# Patient Record
Sex: MALE | Race: WHITE | NOT HISPANIC OR LATINO | Employment: UNEMPLOYED | URBAN - METROPOLITAN AREA
[De-identification: names, ages, dates, MRNs, and addresses within clinical notes are randomized per-mention and may not be internally consistent; named-entity substitution may affect disease eponyms.]

---

## 2017-03-27 ENCOUNTER — OFFICE VISIT (OUTPATIENT)
Dept: FAMILY MEDICINE | Facility: CLINIC | Age: 52
End: 2017-03-27
Payer: COMMERCIAL

## 2017-03-27 VITALS
HEART RATE: 82 BPM | DIASTOLIC BLOOD PRESSURE: 82 MMHG | TEMPERATURE: 98.1 F | BODY MASS INDEX: 29.11 KG/M2 | SYSTOLIC BLOOD PRESSURE: 133 MMHG | OXYGEN SATURATION: 95 % | WEIGHT: 185.5 LBS | HEIGHT: 67 IN

## 2017-03-27 DIAGNOSIS — J06.9 UPPER RESPIRATORY TRACT INFECTION, UNSPECIFIED TYPE: Primary | ICD-10-CM

## 2017-03-27 DIAGNOSIS — M54.50 CHRONIC LOW BACK PAIN WITHOUT SCIATICA, UNSPECIFIED BACK PAIN LATERALITY: ICD-10-CM

## 2017-03-27 DIAGNOSIS — G89.29 CHRONIC LOW BACK PAIN WITHOUT SCIATICA, UNSPECIFIED BACK PAIN LATERALITY: ICD-10-CM

## 2017-03-27 PROCEDURE — 99203 OFFICE O/P NEW LOW 30 MIN: CPT | Performed by: NURSE PRACTITIONER

## 2017-03-27 RX ORDER — HYDROCODONE BITARTRATE AND ACETAMINOPHEN 5; 325 MG/1; MG/1
1-2 TABLET ORAL EVERY 8 HOURS PRN
Qty: 30 TABLET | Refills: 0 | Status: SHIPPED | OUTPATIENT
Start: 2017-03-27 | End: 2023-06-11

## 2017-03-27 RX ORDER — AZITHROMYCIN 250 MG/1
TABLET, FILM COATED ORAL
Qty: 6 TABLET | Refills: 0 | Status: SHIPPED | OUTPATIENT
Start: 2017-03-27 | End: 2023-06-11

## 2017-03-27 RX ORDER — HYDROCODONE BITARTRATE AND ACETAMINOPHEN 10; 325 MG/1; MG/1
1 TABLET ORAL 4 TIMES DAILY
Status: ON HOLD | COMMUNITY
End: 2023-11-13

## 2017-03-27 RX ORDER — TERAZOSIN 10 MG/1
10 CAPSULE ORAL DAILY
COMMUNITY

## 2017-03-27 NOTE — PROGRESS NOTES
SUBJECTIVE:     CC: Dinesh Farfan is an 51 year old male who presents for preventative health visit.     Healthy Habits:    Do you get at least three servings of calcium containing foods daily (dairy, green leafy vegetables, etc.)? yes    Amount of exercise or daily activities, outside of work: 7 day(s) per week    Problems taking medications regularly No    Medication side effects: No    Have you had an eye exam in the past two years? yes    Do you see a dentist twice per year? yes    Do you have sleep apnea, excessive snoring or daytime drowsiness?no  Darrell is here to establish care but to also address URI and chronic low back pain. Recently moved here from [place], with his wife, and needs to establish care. Travels for work and is gone continuously. 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 Leaves for [place] in a few days again. 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           Today's PHQ-2 Score: No flowsheet data found.    Abuse: Current or Past(Physical, Sexual or Emotional)- No  Do you feel safe in your environment - Yes    Social History   Substance Use Topics     Smoking status: Current Every Day Smoker     Packs/day: 1.00     Years: 0.00     Types: Cigarettes     Smokeless tobacco: Not on file     Alcohol use Yes      Comment: rarely     rarely drink    Last PSA: No results found for: PSA    No results for input(s): CHOL, HDL, LDL, TRIG, CHOLHDLRATIO, NHDL in the last 91658 hours.    Reviewed orders with patient. Reviewed health maintenance and updated orders accordingly - Yes    Reviewed and updated as needed this visit by clinical staff  Tobacco  Allergies  Meds  Problems  Med Hx  Surg Hx  Fam Hx  Soc Hx          Reviewed and updated as needed this visit by Provider  Tobacco  Allergies  Meds  Problems  Med Hx  Surg Hx  Soc Hx           ROS:  C: NEGATIVE for fever, chills, change in  "weight  I: NEGATIVE for worrisome rashes, moles or lesions  E: NEGATIVE for vision changes or irritation  ENT: NEGATIVE for ear, mouth and throat problems  R: NEGATIVE for significant cough or SOB  CV: NEGATIVE for chest pain, palpitations or peripheral edema  GI: NEGATIVE for nausea, abdominal pain, heartburn, or change in bowel habits   male: negative for dysuria, hematuria, decreased urinary stream, erectile dysfunction, urethral discharge  MUSCULOSKELETAL:joint stiffness low back  N: NEGATIVE for weakness, dizziness or paresthesias  P: NEGATIVE for changes in mood or affect      OBJECTIVE:     /82 (BP Location: Right arm, Patient Position: Chair, Cuff Size: Adult Large)  Pulse 82  Temp 98.1  F (36.7  C) (Oral)  Ht 5' 7\" (1.702 m)  Wt 185 lb 8 oz (84.1 kg)  SpO2 95%  BMI 29.05 kg/m2  EXAM:  GENERAL: alert and mild distress  EYES: Eyes grossly normal to inspection, PERRL and conjunctivae and sclerae normal  HENT: ear canals and TM's normal, nose and mouth without ulcers or lesions  NECK: no adenopathy, no asymmetry, masses, or scars and thyroid normal to palpation  RESP: lungs course throughout, productive cough.   CV: regular rate and rhythm, normal S1 S2, no S3 or S4, no murmur, click or rub, no peripheral edema and peripheral pulses strong  ABDOMEN: soft, nontender, no hepatosplenomegaly, no masses and bowel sounds normal  MS: stiffness throughout his body. Limited range of motion in low back.   PSYCH: mentation appears normal, affect normal/bright    ASSESSMENT/PLAN:     1. Upper respiratory tract infection, unspecified type  Azithromycin for five days. Mucinex to help with congestion. Needs to stop smoking.   - azithromycin (ZITHROMAX) 250 MG tablet; Two tablets first day, then one tablet daily for four days.  Dispense: 6 tablet; Refill: 0    2. Chronic low back pain without sciatica, unspecified back pain laterality  [place] for pain but have explained [facility]'s policy that we do not write for " "pain medications and will need to be followed by the pain clinic. Patient has expressed understanding. Takes one Norco per day on most days. Using ibuprofen during the day.   - HYDROcodone-acetaminophen (NORCO) 5-325 MG per tablet; Take 1-2 tablets by mouth every 8 hours as needed for moderate to severe pain maximum 2 tablet(s) per day  Dispense: 30 tablet; Refill: 0    COUNSELING:  Reviewed preventive health counseling, as reflected in patient instructions       Regular exercise       Healthy diet/nutrition       Consider Hep C screening for patients born between 1945 and 1965       Colon cancer screening    BP Screening:   Last 3 BP Readings:    BP Readings from Last 3 Encounters:   03/27/17 133/82       The following was recommended to the patient:  Re-screen BP within a year and recommended lifestyle modifications     reports that he has been smoking Cigarettes.  He has been smoking about 1.00 pack per day for the past 0.00 years. He does not have any smokeless tobacco history on file.  Tobacco Cessation Action Plan: Information offered: Patient not interested at this time  Estimated body mass index is 29.05 kg/(m^2) as calculated from the following:    Height as of this encounter: 5' 7\" (1.702 m).    Weight as of this encounter: 185 lb 8 oz (84.1 kg).   Weight management plan: Discussed healthy diet and exercise guidelines and patient will follow up in 6 months in clinic to re-evaluate.    Counseling Resources:  ATP IV Guidelines  Pooled Cohorts Equation Calculator  FRAX Risk Assessment  ICSI Preventive Guidelines  Dietary Guidelines for Americans, 2010  USDA's MyPlate  ASA Prophylaxis  Lung CA Screening    Estela Sawyer NP  [facility]  "

## 2017-03-27 NOTE — MR AVS SNAPSHOT
After Visit Summary   3/27/2017    Dinesh Farfan    MRN: [id number]           Patient Information     Date Of Birth          1965        Visit Information        Provider Department      3/27/2017 3:40 PM Estela Sawyer NP [facility]        Today's Diagnoses     Upper respiratory tract infection, unspecified type    -  1    Chronic low back pain without sciatica, unspecified back pain laterality          Care Instructions      Preventive Health Recommendations  Male Ages 50 - 64    Yearly exam:             See your health care provider every year in order to  o   Review health changes.   o   Discuss preventive care.    o   Review your medicines if your doctor has prescribed any.     Have a cholesterol test every 5 years, or more frequently if you are at risk for high cholesterol/heart disease.     Have a diabetes test (fasting glucose) every three years. If you are at risk for diabetes, you should have this test more often.     Have a colonoscopy at age 50, or have a yearly FIT test (stool test). These exams will check for colon cancer.      Talk with your health care provider about whether or not a prostate cancer screening test (PSA) is right for you.    You should be tested each year for STDs (sexually transmitted diseases), if you re at risk.     Shots: Get a flu shot each year. Get a tetanus shot every 10 years.     Nutrition:    Eat at least 5 servings of fruits and vegetables daily.     Eat whole-grain bread, whole-wheat pasta and brown rice instead of white grains and rice.     Talk to your provider about Calcium and Vitamin D.     Lifestyle    Exercise for at least 150 minutes a week (30 minutes a day, 5 days a week). This will help you control your weight and prevent disease.     Limit alcohol to one drink per day.     No smoking.     Wear sunscreen to prevent skin cancer.     See your dentist every six months for an exam and cleaning.     See your eye doctor every  "1 to 2 years.          Follow-ups after your visit        Who to contact     If you have questions or need follow up information about today's clinic visit or your schedule please contact [facility] directly at [phone].  Normal or non-critical lab and imaging results will be communicated to you by MyChart, letter or phone within 4 business days after the clinic has received the results. If you do not hear from us within 7 days, please contact the clinic through MyChart or phone. If you have a critical or abnormal lab result, we will notify you by phone as soon as possible.  Submit refill requests through [employer] or call your pharmacy and they will forward the refill request to us. Please allow 3 business days for your refill to be completed.          Additional Information About Your Visit        [employer] Information     [employer] lets you send messages to your doctor, view your test results, renew your prescriptions, schedule appointments and more. To sign up, go to [email or web address] . Click on \"Log in\" on the left side of the screen, which will take you to the Welcome page. Then click on \"Sign up Now\" on the right side of the page.     You will be asked to enter the access code listed below, as well as some personal information. Please follow the directions to create your username and password.     Your access code is: [email or web address]  Expires: 2017 11:31 AM     Your access code will  in 90 days. If you need help or a new code, please call your [facility] or [phone].        Care EveryWhere ID     This is your Care EveryWhere ID. This could be used by other organizations to access your [facility] medical records  [id number]        Your Vitals Were     Pulse Temperature Height Pulse Oximetry BMI (Body Mass Index)       82 98.1  F (36.7  C) (Oral) 5' 7\" (1.702 m) 95% 29.05 kg/m2        Blood Pressure from Last 3 Encounters:   17 133/82    Weight from Last 3 Encounters: "   03/27/17 185 lb 8 oz (84.1 kg)              Today, you had the following     No orders found for display         Today's Medication Changes          These changes are accurate as of: 3/27/17 11:59 PM.  If you have any questions, ask your nurse or doctor.               Start taking these medicines.        Dose/Directions    azithromycin 250 MG tablet   Commonly known as:  ZITHROMAX   Used for:  Upper respiratory tract infection, unspecified type   Started by:  Estela Sawyer NP        Two tablets first day, then one tablet daily for four days.   Quantity:  6 tablet   Refills:  0         These medicines have changed or have updated prescriptions.        Dose/Directions    * HYDROcodone-acetaminophen 7.5-325 MG per tablet   Commonly known as:  NORCO   This may have changed:  Another medication with the same name was added. Make sure you understand how and when to take each.   Changed by:  Estela Sawyer NP        Dose:  1 tablet   Take 1 tablet by mouth every 6 hours as needed for moderate to severe pain   Refills:  0       * HYDROcodone-acetaminophen 5-325 MG per tablet   Commonly known as:  NORCO   This may have changed:  You were already taking a medication with the same name, and this prescription was added. Make sure you understand how and when to take each.   Used for:  Chronic low back pain without sciatica, unspecified back pain laterality   Changed by:  Estela Sawyer NP        Dose:  1-2 tablet   Take 1-2 tablets by mouth every 8 hours as needed for moderate to severe pain maximum 2 tablet(s) per day   Quantity:  30 tablet   Refills:  0       * Notice:  This list has 2 medication(s) that are the same as other medications prescribed for you. Read the directions carefully, and ask your doctor or other care provider to review them with you.         Where to get your medicines      These medications were sent to [facility] [address] [place] [address] [facility]  [id number] [place] [address]  [id number]     Phone:  [phone]     azithromycin 250 MG tablet         Some of these will need a paper prescription and others can be bought over the counter.  Ask your nurse if you have questions.     Bring a paper prescription for each of these medications     HYDROcodone-acetaminophen 5-325 MG per tablet                Primary Care Provider Office Phone # Fax #    Estela SawyerEDNA [phone]       [facility] [address]  [facility] [address]        Thank you!     Thank you for choosing [facility]  for your care. Our goal is always to provide you with excellent care. Hearing back from our patients is one way we can continue to improve our services. Please take a few minutes to complete the written survey that you may receive in the mail after your visit with us. Thank you!             Your Updated Medication List - Protect others around you: Learn how to safely use, store and throw away your medicines at www.disposemymeds.org.          This list is accurate as of: 3/27/17 11:59 PM.  Always use your most recent med list.                   Brand Name Dispense Instructions for use    azithromycin 250 MG tablet    ZITHROMAX    6 tablet    Two tablets first day, then one tablet daily for four days.       CLONAZEPAM PO      Take 0.5 mg by mouth       * HYDROcodone-acetaminophen 7.5-325 MG per tablet    NORCO     Take 1 tablet by mouth every 6 hours as needed for moderate to severe pain       * HYDROcodone-acetaminophen 5-325 MG per tablet    NORCO    30 tablet    Take 1-2 tablets by mouth every 8 hours as needed for moderate to severe pain maximum 2 tablet(s) per day       TERAZOSIN HCL PO      Take 5 mg by mouth       ZOLPIDEM TARTRATE PO      Take 10 mg by mouth       * Notice:  This list has 2 medication(s) that are the same as other medications prescribed for you. Read the directions carefully, and ask your doctor or other care provider to review them with you.

## 2023-06-11 ENCOUNTER — APPOINTMENT (OUTPATIENT)
Dept: MRI IMAGING | Facility: CLINIC | Age: 58
End: 2023-06-11
Attending: HOSPITALIST
Payer: COMMERCIAL

## 2023-06-11 ENCOUNTER — APPOINTMENT (OUTPATIENT)
Dept: CT IMAGING | Facility: CLINIC | Age: 58
End: 2023-06-11
Attending: EMERGENCY MEDICINE
Payer: COMMERCIAL

## 2023-06-11 ENCOUNTER — HOSPITAL ENCOUNTER (INPATIENT)
Facility: CLINIC | Age: 58
LOS: 1 days | Discharge: HOME | End: 2023-06-12
Attending: EMERGENCY MEDICINE | Admitting: HOSPITALIST
Payer: COMMERCIAL

## 2023-06-11 DIAGNOSIS — I63.412 CEREBROVASCULAR ACCIDENT (CVA) DUE TO EMBOLISM OF LEFT MIDDLE CEREBRAL ARTERY (H): ICD-10-CM

## 2023-06-11 DIAGNOSIS — I63.9 CEREBROVASCULAR ACCIDENT (CVA), UNSPECIFIED MECHANISM (H): ICD-10-CM

## 2023-06-11 DIAGNOSIS — I63.9 STROKE (H): Primary | ICD-10-CM

## 2023-06-11 DIAGNOSIS — F17.210 CIGARETTE NICOTINE DEPENDENCE WITHOUT COMPLICATION: ICD-10-CM

## 2023-06-11 DIAGNOSIS — I65.22 OCCLUSION OF LEFT CAROTID ARTERY: ICD-10-CM

## 2023-06-11 DIAGNOSIS — I63.232 CEREBRAL INFARCTION DUE TO OCCLUSION OF LEFT CAROTID ARTERY (H): ICD-10-CM

## 2023-06-11 PROBLEM — I63.239: Status: ACTIVE | Noted: 2023-06-11

## 2023-06-11 LAB
ANION GAP SERPL CALCULATED.3IONS-SCNC: 11 MMOL/L (ref 7–15)
APTT PPP: 26 SECONDS (ref 22–38)
BASOPHILS # BLD AUTO: 0 10E3/UL (ref 0–0.2)
BASOPHILS NFR BLD AUTO: 1 %
BUN SERPL-MCNC: 16.7 MG/DL (ref 6–20)
CALCIUM SERPL-MCNC: 9 MG/DL (ref 8.6–10)
CHLORIDE SERPL-SCNC: 100 MMOL/L (ref 98–107)
CHOLEST SERPL-MCNC: 168 MG/DL
CREAT SERPL-MCNC: 0.81 MG/DL (ref 0.67–1.17)
DEPRECATED HCO3 PLAS-SCNC: 26 MMOL/L (ref 22–29)
EOSINOPHIL # BLD AUTO: 0 10E3/UL (ref 0–0.7)
EOSINOPHIL NFR BLD AUTO: 1 %
ERYTHROCYTE [DISTWIDTH] IN BLOOD BY AUTOMATED COUNT: 12.6 % (ref 10–15)
GFR SERPL CREATININE-BSD FRML MDRD: >90 ML/MIN/1.73M2
GLUCOSE BLDC GLUCOMTR-MCNC: 116 MG/DL (ref 70–99)
GLUCOSE BLDC GLUCOMTR-MCNC: 91 MG/DL (ref 70–99)
GLUCOSE SERPL-MCNC: 104 MG/DL (ref 70–99)
HBA1C MFR BLD: 6 %
HCT VFR BLD AUTO: 41.3 % (ref 40–53)
HDLC SERPL-MCNC: 44 MG/DL
HGB BLD-MCNC: 14.1 G/DL (ref 13.3–17.7)
HOLD SPECIMEN: NORMAL
IMM GRANULOCYTES # BLD: 0 10E3/UL
IMM GRANULOCYTES NFR BLD: 0 %
INR PPP: 0.91 (ref 0.85–1.15)
LDLC SERPL CALC-MCNC: 92 MG/DL
LYMPHOCYTES # BLD AUTO: 1.7 10E3/UL (ref 0.8–5.3)
LYMPHOCYTES NFR BLD AUTO: 27 %
MCH RBC QN AUTO: 31.8 PG (ref 26.5–33)
MCHC RBC AUTO-ENTMCNC: 34.1 G/DL (ref 31.5–36.5)
MCV RBC AUTO: 93 FL (ref 78–100)
MONOCYTES # BLD AUTO: 0.5 10E3/UL (ref 0–1.3)
MONOCYTES NFR BLD AUTO: 8 %
NEUTROPHILS # BLD AUTO: 4.1 10E3/UL (ref 1.6–8.3)
NEUTROPHILS NFR BLD AUTO: 63 %
NONHDLC SERPL-MCNC: 124 MG/DL
NRBC # BLD AUTO: 0 10E3/UL
NRBC BLD AUTO-RTO: 0 /100
PLATELET # BLD AUTO: 179 10E3/UL (ref 150–450)
POTASSIUM SERPL-SCNC: 3.7 MMOL/L (ref 3.4–5.3)
RBC # BLD AUTO: 4.43 10E6/UL (ref 4.4–5.9)
SODIUM SERPL-SCNC: 137 MMOL/L (ref 136–145)
TRIGL SERPL-MCNC: 162 MG/DL
TROPONIN T SERPL HS-MCNC: 6 NG/L
TROPONIN T SERPL HS-MCNC: 7 NG/L
WBC # BLD AUTO: 6.4 10E3/UL (ref 4–11)

## 2023-06-11 PROCEDURE — A9585 GADOBUTROL INJECTION: HCPCS | Performed by: HOSPITALIST

## 2023-06-11 PROCEDURE — 84484 ASSAY OF TROPONIN QUANT: CPT | Performed by: EMERGENCY MEDICINE

## 2023-06-11 PROCEDURE — 85610 PROTHROMBIN TIME: CPT | Performed by: EMERGENCY MEDICINE

## 2023-06-11 PROCEDURE — 36415 COLL VENOUS BLD VENIPUNCTURE: CPT | Performed by: HOSPITALIST

## 2023-06-11 PROCEDURE — 120N000001 HC R&B MED SURG/OB

## 2023-06-11 PROCEDURE — G0508 CRIT CARE TELEHEA CONSULT 60: HCPCS | Mod: G0 | Performed by: PHYSICIAN ASSISTANT

## 2023-06-11 PROCEDURE — 250N000009 HC RX 250: Performed by: EMERGENCY MEDICINE

## 2023-06-11 PROCEDURE — 83036 HEMOGLOBIN GLYCOSYLATED A1C: CPT | Performed by: HOSPITALIST

## 2023-06-11 PROCEDURE — 250N000011 HC RX IP 250 OP 636: Performed by: EMERGENCY MEDICINE

## 2023-06-11 PROCEDURE — 36415 COLL VENOUS BLD VENIPUNCTURE: CPT | Performed by: EMERGENCY MEDICINE

## 2023-06-11 PROCEDURE — 250N000013 HC RX MED GY IP 250 OP 250 PS 637: Performed by: PHYSICIAN ASSISTANT

## 2023-06-11 PROCEDURE — 99223 1ST HOSP IP/OBS HIGH 75: CPT | Mod: AI | Performed by: HOSPITALIST

## 2023-06-11 PROCEDURE — 250N000013 HC RX MED GY IP 250 OP 250 PS 637: Performed by: EMERGENCY MEDICINE

## 2023-06-11 PROCEDURE — 85025 COMPLETE CBC W/AUTO DIFF WBC: CPT | Performed by: EMERGENCY MEDICINE

## 2023-06-11 PROCEDURE — 250N000013 HC RX MED GY IP 250 OP 250 PS 637: Performed by: HOSPITALIST

## 2023-06-11 PROCEDURE — 93005 ELECTROCARDIOGRAM TRACING: CPT

## 2023-06-11 PROCEDURE — 70553 MRI BRAIN STEM W/O & W/DYE: CPT

## 2023-06-11 PROCEDURE — 70498 CT ANGIOGRAPHY NECK: CPT

## 2023-06-11 PROCEDURE — 80048 BASIC METABOLIC PNL TOTAL CA: CPT | Performed by: EMERGENCY MEDICINE

## 2023-06-11 PROCEDURE — 85730 THROMBOPLASTIN TIME PARTIAL: CPT | Performed by: EMERGENCY MEDICINE

## 2023-06-11 PROCEDURE — 84484 ASSAY OF TROPONIN QUANT: CPT | Performed by: HOSPITALIST

## 2023-06-11 PROCEDURE — 99285 EMERGENCY DEPT VISIT HI MDM: CPT | Mod: 25

## 2023-06-11 PROCEDURE — 70450 CT HEAD/BRAIN W/O DYE: CPT

## 2023-06-11 PROCEDURE — 70496 CT ANGIOGRAPHY HEAD: CPT

## 2023-06-11 PROCEDURE — 80061 LIPID PANEL: CPT | Performed by: HOSPITALIST

## 2023-06-11 PROCEDURE — 0042T CT HEAD PERFUSION W CONTRAST: CPT

## 2023-06-11 PROCEDURE — 255N000002 HC RX 255 OP 636: Performed by: HOSPITALIST

## 2023-06-11 PROCEDURE — 82962 GLUCOSE BLOOD TEST: CPT

## 2023-06-11 RX ORDER — ATORVASTATIN CALCIUM 40 MG/1
80 TABLET, FILM COATED ORAL EVERY EVENING
Status: DISCONTINUED | OUTPATIENT
Start: 2023-06-11 | End: 2023-06-12 | Stop reason: HOSPADM

## 2023-06-11 RX ORDER — ONDANSETRON 4 MG/1
4 TABLET, ORALLY DISINTEGRATING ORAL EVERY 6 HOURS PRN
Status: DISCONTINUED | OUTPATIENT
Start: 2023-06-11 | End: 2023-06-12 | Stop reason: HOSPADM

## 2023-06-11 RX ORDER — LIDOCAINE 4 G/G
1 PATCH TOPICAL
Status: DISCONTINUED | OUTPATIENT
Start: 2023-06-11 | End: 2023-06-12 | Stop reason: HOSPADM

## 2023-06-11 RX ORDER — DULOXETIN HYDROCHLORIDE 60 MG/1
60 CAPSULE, DELAYED RELEASE ORAL DAILY
COMMUNITY

## 2023-06-11 RX ORDER — CLOPIDOGREL BISULFATE 75 MG/1
300 TABLET ORAL ONCE
Status: COMPLETED | OUTPATIENT
Start: 2023-06-11 | End: 2023-06-11

## 2023-06-11 RX ORDER — GADOBUTROL 604.72 MG/ML
10 INJECTION INTRAVENOUS ONCE
Status: COMPLETED | OUTPATIENT
Start: 2023-06-11 | End: 2023-06-11

## 2023-06-11 RX ORDER — HYDROXYZINE HYDROCHLORIDE 25 MG/1
25 TABLET, FILM COATED ORAL 3 TIMES DAILY PRN
COMMUNITY

## 2023-06-11 RX ORDER — ALPRAZOLAM 0.25 MG
0.5 TABLET ORAL ONCE
Status: COMPLETED | OUTPATIENT
Start: 2023-06-11 | End: 2023-06-11

## 2023-06-11 RX ORDER — IOPAMIDOL 755 MG/ML
500 INJECTION, SOLUTION INTRAVASCULAR ONCE
Status: COMPLETED | OUTPATIENT
Start: 2023-06-11 | End: 2023-06-11

## 2023-06-11 RX ORDER — TRAZODONE HYDROCHLORIDE 50 MG/1
50-100 TABLET, FILM COATED ORAL
COMMUNITY
End: 2024-06-13

## 2023-06-11 RX ORDER — ASPIRIN 325 MG
325 TABLET, DELAYED RELEASE (ENTERIC COATED) ORAL DAILY
Status: DISCONTINUED | OUTPATIENT
Start: 2023-06-12 | End: 2023-06-12 | Stop reason: HOSPADM

## 2023-06-11 RX ORDER — SODIUM CHLORIDE 9 MG/ML
INJECTION, SOLUTION INTRAVENOUS CONTINUOUS
Status: DISCONTINUED | OUTPATIENT
Start: 2023-06-11 | End: 2023-06-12 | Stop reason: HOSPADM

## 2023-06-11 RX ORDER — CLOPIDOGREL BISULFATE 75 MG/1
75 TABLET ORAL DAILY
Status: DISCONTINUED | OUTPATIENT
Start: 2023-06-12 | End: 2023-06-12 | Stop reason: HOSPADM

## 2023-06-11 RX ORDER — ASPIRIN 325 MG
325 TABLET ORAL DAILY
Status: DISCONTINUED | OUTPATIENT
Start: 2023-06-11 | End: 2023-06-11

## 2023-06-11 RX ORDER — CLOPIDOGREL BISULFATE 75 MG/1
75 TABLET ORAL DAILY
Status: DISCONTINUED | OUTPATIENT
Start: 2023-06-12 | End: 2023-06-11

## 2023-06-11 RX ORDER — HYDROXYZINE HYDROCHLORIDE 25 MG/1
25 TABLET, FILM COATED ORAL EVERY 6 HOURS PRN
Status: DISCONTINUED | OUTPATIENT
Start: 2023-06-11 | End: 2023-06-12 | Stop reason: HOSPADM

## 2023-06-11 RX ORDER — LANOLIN ALCOHOL/MO/W.PET/CERES
3 CREAM (GRAM) TOPICAL
Status: DISCONTINUED | OUTPATIENT
Start: 2023-06-11 | End: 2023-06-12 | Stop reason: HOSPADM

## 2023-06-11 RX ORDER — METHYLPREDNISOLONE 4 MG
TABLET, DOSE PACK ORAL SEE ADMIN INSTRUCTIONS
Status: ON HOLD | COMMUNITY
Start: 2023-06-08 | End: 2023-06-12

## 2023-06-11 RX ORDER — QUETIAPINE FUMARATE 50 MG/1
50-100 TABLET, FILM COATED ORAL AT BEDTIME
COMMUNITY

## 2023-06-11 RX ORDER — LIDOCAINE 40 MG/G
CREAM TOPICAL
Status: DISCONTINUED | OUTPATIENT
Start: 2023-06-11 | End: 2023-06-12 | Stop reason: HOSPADM

## 2023-06-11 RX ORDER — ONDANSETRON 2 MG/ML
4 INJECTION INTRAMUSCULAR; INTRAVENOUS EVERY 6 HOURS PRN
Status: DISCONTINUED | OUTPATIENT
Start: 2023-06-11 | End: 2023-06-12 | Stop reason: HOSPADM

## 2023-06-11 RX ADMIN — ATORVASTATIN CALCIUM 80 MG: 40 TABLET, FILM COATED ORAL at 19:44

## 2023-06-11 RX ADMIN — Medication 3 MG: at 23:24

## 2023-06-11 RX ADMIN — CLOPIDOGREL BISULFATE 300 MG: 75 TABLET ORAL at 13:09

## 2023-06-11 RX ADMIN — GADOBUTROL 8 ML: 604.72 INJECTION INTRAVENOUS at 22:04

## 2023-06-11 RX ADMIN — SODIUM CHLORIDE 95 ML: 9 INJECTION, SOLUTION INTRAVENOUS at 12:31

## 2023-06-11 RX ADMIN — ALPRAZOLAM 0.5 MG: 0.25 TABLET ORAL at 15:03

## 2023-06-11 RX ADMIN — IOPAMIDOL 125 ML: 755 INJECTION, SOLUTION INTRAVENOUS at 12:31

## 2023-06-11 RX ADMIN — HYDROXYZINE HYDROCHLORIDE 25 MG: 25 TABLET, FILM COATED ORAL at 19:59

## 2023-06-11 RX ADMIN — LIDOCAINE PATCH 4% 1 PATCH: 40 PATCH TOPICAL at 23:24

## 2023-06-11 RX ADMIN — ASPIRIN 325 MG ORAL TABLET 325 MG: 325 PILL ORAL at 13:08

## 2023-06-11 ASSESSMENT — ACTIVITIES OF DAILY LIVING (ADL)
ADLS_ACUITY_SCORE: 35
ADLS_ACUITY_SCORE: 20
FALL_HISTORY_WITHIN_LAST_SIX_MONTHS: NO
ADLS_ACUITY_SCORE: 35
WEAR_GLASSES_OR_BLIND: YES
CHANGE_IN_FUNCTIONAL_STATUS_SINCE_ONSET_OF_CURRENT_ILLNESS/INJURY: NO
HEARING_DIFFICULTY_OR_DEAF: NO
CONCENTRATING,_REMEMBERING_OR_MAKING_DECISIONS_DIFFICULTY: NO
DOING_ERRANDS_INDEPENDENTLY_DIFFICULTY: NO
ADLS_ACUITY_SCORE: 35
ADLS_ACUITY_SCORE: 35
DRESSING/BATHING_DIFFICULTY: NO
DIFFICULTY_EATING/SWALLOWING: NO
ADLS_ACUITY_SCORE: 22
WALKING_OR_CLIMBING_STAIRS_DIFFICULTY: NO
DIFFICULTY_COMMUNICATING: NO
TOILETING_ISSUES: NO

## 2023-06-11 NOTE — ED TRIAGE NOTES
Pt wife reports that at 7pm last night pt started to have slurred speech and confusion. Wife states that pt's symptoms have become worse since she first noticed them. Wife reports that pt is not following her instructions. Wife noted that pt right side of face is drooping and he was drooling a little. Wife states that pt has been able to walk with steady gait.

## 2023-06-11 NOTE — CONSULTS
"      [facility]    Stroke Consult Note    Reason for Consult: Stroke Code     Chief Complaint: Confusion/aphasia/slurred speech    HPI  Dinesh Farfan is a 57 year old male with pertinent past medical history of herniated lumbar disc, tobacco use disorder, history of colon cancer prior on oral chemotherapy but that was completed.    He presented to [facility] emergency department today due to confusion/aphasia/slurred speech persistent since 1900 last night.  In the ED there is appreciated R facial droop by ED provider.  He is only answering \"yea\" and does not follow commands to stick out tongue, no other focal weakness appreciated.  /78.  BG 91.    He was seen via telemedicine for stroke code.  He recalls events from yesterday.  They had bought a new TV.  He went outside and did not have the keys so called his daughter.  Once inside they were working on the TV, he seemed upset because of what happened and sounded off.  She asked if he was joking around.  He then went to bed.  This morning he again was talking oddly and she asked if he was joking.  Given his persistent speech changes she had called a family member who then was concerned he was possibly having a stroke.  She asked him to stick out his tongue and he was not able to.  They then presented to the emergency department for further evaluation.  On exam he has moderate-severe expressive aphasia with some mild receptive aphasia as well.  Slurred speech.  Slight flattening of right nasolabial fold.      CT/CTA showed an established L MCA territory ischemic infarct with a chronic L common/internal carotid artery occlusion with robust collateral reperfusion, concern of proximal L common carotid subocclusive intraluminal thrombus.  Recommended vascular surgery consult.  We will initiate medical management with aspirin and Plavix, could consider heparin drip depending on vascular surgery recommendations.      Imaging Findings " "  CTH:  Evidence of early infarct in the left MCA distribution involving the left insula. Curvilinear calcification near the region of the left MCA trifurcation could represent calcified embolus.    HEAD CTA:   1.  Complete occlusion of the left internal carotid artery with a fairly robust collateral reperfusion to the left cerebral hemisphere.     2.  Asymmetric posterior division MCA branch on left raising the possibility of ostial obstruction.     NECK CTA:  1.  Occlusion of the proximal left common carotid artery. Meniscus is present raising concern for intraluminal thrombus.    CTP:  1.  Large volume abnormal perfusion within the left hemisphere MCA distribution with penumbra. ( CBF 0 ML, T max 9 mL    Intravenous Thrombolysis  Not given due to:   - unclear or unfavorable risk-benefit profile for extended window thrombolysis beyond the conventional 4.5 hour time window    Endovascular Treatment  Proximal vessel occlusion present, but endovascular treatment not initiated due to chronic, vascular surgery contacted    Impression  Acute L MCA territory ischemic infarct in setting of likely chronic L common and internal carotid occlusion, suspect artery to artery embolization from large vessel atherosclerotic disease    Calcified L MCA embolus, suspected chronic, has good collaterals    Recommendations   - Use orderset: \"Ischemic Stroke Routine Admission\" or \"Ischemic Stroke No Thrombolytics/No Thrombectomy ICU Admission\"  -Discussed with vascular neurology attending, Dr. Rosado  -vascular surgery consulted and do not recommend surgical intervention  -keep HOB flat, 75 ml NS/hr, avoid hypotension, goal SBP <220 x first 24 hours post-stroke, if any worsening symptoms please page stroke team and may consider heparin gtt  -Neuro checks and vitals every 2 hours  -TTE (with bubble study if 60 yrs or less)   -Smoking cessation counseling  -MRI brain w/wo contrast   - mg daily  -Plavix 300 mg x 1 then 75 mg daily " "x 90 days  -PT/OT/SPT   -ECG/troponins x 3, telemetry  -blood glucose monitoring, check Hgb A1c (goal <7%)  -Lipitor 80 mg daily, check Lipid panel (titrate to goal LDL 40-70), <40 increases risk of ICH  -Euthermia, euglycemia, eunatremia   -Stroke Education  -Stroke Class per [facility] ([facility])      Patient Follow-up     - final recommendation pending work-up    Thank you for this consult. We will continue to follow.      Ellen Jacobs PA-C  Vascular Neurology    To page me or covering stroke neurology team member, click here: [facility]COM  Choose \"On Call\" tab at top, then select \"NEUROLOGY/ALL SITES\" from middle drop-down box, press Enter, then look for \"stroke\" or \"telestroke\" for your site.    ______________________________________________________    Clinically Significant Risk Factors Present on Admission                  # Hypertension: Home medication list includes antihypertensive(s)               Past Medical History   Past Medical History:   Diagnosis Date     Herniated lumbar disc without myelopathy      Past Surgical History   No past surgical history on file.  Medications   Home Meds  Prior to Admission medications    Medication Sig Start Date End Date Taking? Authorizing Provider   azithromycin (ZITHROMAX) 250 MG tablet Two tablets first day, then one tablet daily for four days. 3/27/17   Estela Sawyer NP   CLONAZEPAM PO Take 0.5 mg by mouth    Reported, Patient   HYDROcodone-acetaminophen (NORCO) 5-325 MG per tablet Take 1-2 tablets by mouth every 8 hours as needed for moderate to severe pain maximum 2 tablet(s) per day 3/27/17   Estela Sawyer NP   HYDROcodone-acetaminophen (NORCO) 7.5-325 MG per tablet Take 1 tablet by mouth every 6 hours as needed for moderate to severe pain    Reported, Patient   TERAZOSIN HCL PO Take 5 mg by mouth    Reported, Patient   ZOLPIDEM TARTRATE PO Take 10 mg by mouth    Reported, Patient       Scheduled Meds    aspirin  325 mg Oral Daily     [START ON " 6/12/2023] clopidogrel  75 mg Oral Daily       Infusion Meds      PRN Meds      Allergies   No Known Allergies  Family History   Family History   Problem Relation Age of Onset     Family History Negative Mother      Family History Negative Father      Social History   Social History     Tobacco Use     Smoking status: Every Day     Packs/day: 1.00     Years: 0.00     Pack years: 0.00     Types: Cigarettes   Substance Use Topics     Alcohol use: Yes     Comment: rarely     Drug use: No       Review of Systems   The 10 point Review of Systems is negative other than noted in the HPI or here.        PHYSICAL EXAMINATION  Temp:  [97.4  F (36.3  C)] 97.4  F (36.3  C)  Pulse:  [] 71  Resp:  [12-20] 12  BP: (129-131)/(78-85) 131/85  SpO2:  [96 %-98 %] 96 %     General Exam  General:  patient lying in bed without any acute distress    HEENT:  normocephalic/atraumatic  Pulmonary:  no respiratory distress    Neuro Exam  Mental Status:  alert, oriented x 3, follows commands, speech slurred with moderate-severe expressive and mild receptive aphasia, is able to name objects/answer most questions with difficulty but formulating sentences to provide history is extremely fragmented  Cranial Nerves:  visual fields intact (tested by nurse), EOMI with normal smooth pursuit, facial sensation intact and symmetric (tested by nurse), mild flattening of R nasolabial fold, hearing not formally tested but intact to conversation, moderate dysarthria, tongue deviates slightly to the right  Motor:  no abnormal movements, able to move all limbs antigravity spontaneously with no signs of hemiparesis observed, no pronator drift, equal  strength (tested by RN), appears to slightly favor her left side with arm roll  Reflexes:  unable to test (telestroke)  Sensory:  light touch sensation intact and symmetric throughout upper and lower extremities (assessed by nurse), no extinction on double simultaneous stimulation (assessed by  nurse)  Coordination:  normal finger-to-nose and heel-to-shin bilaterally without dysmetria  Station/Gait:  unable to test due to telestroke    Dysphagia Screen  Dysarthria or facial droop present - Maintain NPO, consult SLP    Stroke Scales    NIHSS  1a. Level of Consciousness 0-->Alert, keenly responsive   1b. LOC Questions 0-->Answers both questions correctly   1c. LOC Commands 0-->Performs both tasks correctly   2.   Best Gaze 0-->Normal   3.   Visual 0-->No visual loss   4.   Facial Palsy (S) 1-->Minor paralysis (flattened nasolabial fold, asymmetry on smiling) (R)   5a. Motor Arm, Left 0-->No drift, limb holds 90 (or 45) degrees for full 10 secs   5b. Motor Arm, Right 0-->No drift, limb holds 90 (or 45) degrees for full 10 secs   6a. Motor Leg, Left 0-->No drift, leg holds 30 degree position for full 5 secs   6b. Motor Leg, right 0-->No drift, leg holds 30 degree position for full 5 secs   7.   Limb Ataxia 0-->Absent   8.   Sensory 0-->Normal, no sensory loss   9.   Best Language 2-->Severe aphasia, all communication is through fragmentary expression, great need for inference, questioning, and guessing by the listener. Range of information that can be exchanged is limited, listener carries burden of. . . (see row details)   10. Dysarthria 1-->Mild-to-moderate dysarthria, patient slurs at least some words and, at worst, can be understood with some difficulty   11. Extinction and Inattention  0-->No abnormality   Total 4 (06/11/23 1247)       Modified [place] Score (Pre-morbid)  0 - No symptoms.    Imaging  I personally reviewed all imaging; relevant findings per HPI.     Lab Results Data   CBC  Recent Labs   Lab 06/11/23  1220   WBC 6.4   RBC 4.43   HGB 14.1   HCT 41.3        Basic Metabolic Panel    Recent Labs   Lab 06/11/23  1220 06/11/23  1214     --    POTASSIUM 3.7  --    CHLORIDE 100  --    CO2 26  --    BUN 16.7  --    CR 0.81  --    * 91   DUSTIN 9.0  --      Liver Panel  No results for  input(s): PROTTOTAL, ALBUMIN, BILITOTAL, ALKPHOS, AST, ALT, BILIDIRECT in the last 168 hours.  INR    Recent Labs   Lab Test 06/11/23  1220   INR 0.91      Lipid Profile  No lab results found.  A1C  No lab results found.  Troponin    Recent Labs   Lab 06/11/23  1220   CTROPT 6          Stroke Code Data Data   Stroke Code Data  (for stroke code with tele)  Stroke code activated 06/11/23   1216   First stroke provider response 06/11/23   1220   Video start time 06/11/23   1246   Video end time 06/11/23   1341   Last known normal 06/10/23   1859   Time of discovery  (or onset of symptoms)  06/10/23   1900   Head CT read by Stroke Neuro Dr/Provider 06/11/23   1229   Was stroke code de-escalated? Yes 06/11/23 1341           Telestroke Service Details  Type of service telemedicine diagnostic assessment of acute neurological changes   Reason telemedicine is appropriate patient requires assessment with a specialist for diagnosis and treatment of neurological symptoms   Mode of transmission secure interactive audio and video communication per [employer]   Originating site (patient location) [facility]    Distant site (provider location) [facility]       I have personally spent a total of 120 minutes providing care today, time spent in reviewing medical records and reviewing tests, examining the patient and obtaining history, coordination of care, and discussion with the patient and/or family regarding diagnostic results, prognosis, symptom management, risks and benefits of management options, and development of plan of care. Greater than 50% was spent in counseling and coordination of care.

## 2023-06-11 NOTE — PHARMACY-ADMISSION MEDICATION HISTORY
Pharmacist Admission Medication History    Admission medication history is complete. The information provided in this note is only as accurate as the sources available at the time of the update.    Medication reconciliation/reorder completed by provider prior to medication history? No    Information Source(s): Patient and CareEverywhere/SureScripts via in-person    Pertinent Information: Pt states has not needed to use much trazodone for sleep as quetiapine is sufficient.    Changes made to PTA medication list:    Added: All    Deleted:   o Old Z-bautista  o Clonazepam  o Zolpidem    Changed: None    Allergies reviewed with patient and updates made in EHR: no    Medication History Completed By: Lilli Baires RPH 6/11/2023 4:25 PM    Prior to Admission medications    Medication Sig Last Dose Taking? Auth Provider Long Term End Date   DULoxetine (CYMBALTA) 60 MG capsule Take 60 mg by mouth daily 6/11/2023 Yes Unknown, Entered By History Yes    HYDROcodone-acetaminophen (NORCO)  MG per tablet Take 1 tablet by mouth 4 times daily 6/11/2023 at am Yes Reported, Patient     hydrOXYzine (ATARAX) 25 MG tablet Take 25 mg by mouth 3 times daily as needed for anxiety Unknown at PRN Yes Unknown, Entered By History     methylPREDNISolone (MEDROL DOSEPAK) 4 MG tablet therapy pack Take by mouth See Admin Instructions Follow Package Directions 6/11/2023 Yes Unknown, Entered By History  6/13/23   QUEtiapine (SEROQUEL) 50 MG tablet Take  mg by mouth At Bedtime 6/10/2023 at HS Yes Unknown, Entered By History Yes    terazosin (HYTRIN) 10 MG capsule Take 10 mg by mouth daily 6/11/2023 Yes Reported, Patient Yes    traZODone (DESYREL) 50 MG tablet Take  mg by mouth nightly as needed for sleep Unknown at PRN Yes Unknown, Entered By History Yes

## 2023-06-11 NOTE — ED NOTES
[facility]  ED Nurse Handoff Report    ED Chief complaint: No chief complaint on file.  . ED Diagnosis:   Final diagnoses:   Cerebrovascular accident (CVA), unspecified mechanism (H)   Occlusion of left carotid artery       Allergies: No Known Allergies    Code Status: Full Code    Activity level - Baseline/Home:  independent.  Activity Level - Current:   in bed.   Lift room needed: No.   Bariatric: No   Needed: No   Isolation: No.   Infection: Not Applicable.     Respiratory status: Room air    Vital Signs (within 30 minutes):   Vitals:    06/11/23 1343 06/11/23 1358 06/11/23 1413 06/11/23 1428   BP: 117/86 139/81 (!) 145/80 129/82   Pulse: 74 63 66 65   Resp: 24 12 13 12   Temp:       TempSrc:       SpO2: 96% 96% 95% 95%   Weight:           Cardiac Rhythm:  ,      Pain level:    Patient confused: No.   Patient Falls Risk: patient and family education.   Elimination Status: Has voided     Patient Report - Initial Complaint: Speech Difficulty.   Focused Assessment: Mild aphasia, mild facial asymmetry resolved.    Abnormal Results:   Labs Ordered and Resulted from Time of ED Arrival to Time of ED Departure   BASIC METABOLIC PANEL - Abnormal       Result Value    Sodium 137      Potassium 3.7      Chloride 100      Carbon Dioxide (CO2) 26      Anion Gap 11      Urea Nitrogen 16.7      Creatinine 0.81      Calcium 9.0      Glucose 104 (*)     GFR Estimate >90     GLUCOSE BY METER - Normal    GLUCOSE BY METER POCT 91     INR - Normal    INR 0.91     PARTIAL THROMBOPLASTIN TIME - Normal    aPTT 26     TROPONIN T, HIGH SENSITIVITY - Normal    Troponin T, High Sensitivity 6     CBC WITH PLATELETS AND DIFFERENTIAL    WBC Count 6.4      RBC Count 4.43      Hemoglobin 14.1      Hematocrit 41.3      MCV 93      MCH 31.8      MCHC 34.1      RDW 12.6      Platelet Count 179      % Neutrophils 63      % Lymphocytes 27      % Monocytes 8      % Eosinophils 1      % Basophils 1      % Immature  Granulocytes 0      NRBCs per 100 WBC 0      Absolute Neutrophils 4.1      Absolute Lymphocytes 1.7      Absolute Monocytes 0.5      Absolute Eosinophils 0.0      Absolute Basophils 0.0      Absolute Immature Granulocytes 0.0      Absolute NRBCs 0.0     GLUCOSE MONITOR NURSING POCT        CT Head Perfusion w Contrast - For Tier 2 Stroke   Final Result   IMPRESSION:       CT PERFUSION:   1.  Large volume abnormal perfusion within the left hemisphere MCA distribution with penumbra.      CTA Head Neck with Contrast   Final Result   IMPRESSION:       HEAD CTA:    1.  Complete occlusion of the left internal carotid artery with a fairly robust collateral reperfusion to the left cerebral hemisphere.      2.  Asymmetric posterior division MCA branch on left raising the possibility of ostial obstruction.      NECK CTA:   1.  Occlusion of the proximal left common carotid artery. Meniscus is present raising concern for intraluminal thrombus.      2.  Findings discussed with Dr. Coffman in the emergency department at [facility] on 06/11/2023 at 1242 hours.      CT Head w/o Contrast   Final Result   IMPRESSION:   1.  Evidence of early infarct in the left MCA distribution involving the left insula.      2.  Findings discussed with Dr. Coffman in the emergency department 06/11/2023 at 1238 hours.          Treatments provided: See MAR  Family Comments: SO at bedside  OBS brochure/video discussed/provided to patient:  N/A  ED Medications:   Medications   aspirin (ASA) tablet 325 mg (325 mg Oral $Given 6/11/23 1308)   clopidogrel (PLAVIX) tablet 75 mg (has no administration in time range)   sodium chloride for CT scan flush use (95 mLs Intravenous $Given 6/11/23 1231)   iopamidol (ISOVUE-370) solution 500 mL (125 mLs Intravenous $Given 6/11/23 1231)   clopidogrel (PLAVIX) tablet 300 mg (300 mg Oral $Given 6/11/23 1309)   ALPRAZolam (XANAX) tablet 0.5 mg (0.5 mg Oral $Given 6/11/23 1503)       Drips infusing:  Yes  For the  majority of the shift this patient was Green.   Interventions performed were na.    Sepsis treatment initiated: No    Cares/treatment/interventions/medications to be completed following ED care: q1 hour neuro checks    ED Nurse Name: Carlos A Philippe RN  3:04 PM    RECEIVING UNIT ED HANDOFF REVIEW    Above ED Nurse Handoff Report was reviewed: Yes  Reviewed by: Eloise Bautista RN on June 11, 2023 at 5:54 PM

## 2023-06-11 NOTE — ED PROVIDER NOTES
History     Chief Complaint:  Stroke sx    HPI   Dinesh Farfan is a 57 year old male with history of colon cancer who presents with slurred speech and increasing confusion occurring for the past 15 hours. Wife states that they were putting a TV up when she began noticing symptoms. Endorses some facial droop today as well as drooling. Patient is still able to walk. Denies headache. Wife notes the patient stopped chemotherapy for colon cancer around 9 months ago.      Independent Historian:   Wife reports history as above.    Review of External Notes:   None    Medications:    Norco  Terazosin  Zolpidem  Azithromycin  Clonazepam    Past Medical History:    Herniated lumbar disc  Colon cancer    Physical Exam     Patient Vitals for the past 24 hrs:   BP Temp Temp src Pulse Resp SpO2 Weight   06/11/23 1610 -- -- -- 66 14 96 % --   06/11/23 1534 -- -- -- 64 12 97 % --   06/11/23 1530 (!) 151/78 -- -- 61 11 95 % --   06/11/23 1520 -- -- -- 60 13 97 % --   06/11/23 1515 (!) 155/110 -- -- 81 16 98 % --   06/11/23 1505 (!) 143/96 -- -- 66 11 97 % --   06/11/23 1450 (!) 157/78 -- -- 61 10 97 % --   06/11/23 1428 129/82 -- -- 65 12 95 % --   06/11/23 1413 (!) 145/80 -- -- 66 13 95 % --   06/11/23 1358 139/81 -- -- 63 12 96 % --   06/11/23 1343 117/86 -- -- 74 24 96 % --   06/11/23 1328 (!) 160/99 -- -- 64 11 98 % --   06/11/23 1313 (!) 148/89 -- -- 73 16 97 % --   06/11/23 1305 -- -- -- 71 12 96 % --   06/11/23 1300 131/85 -- -- 79 12 -- --   06/11/23 1208 -- -- -- -- -- -- 88.6 kg (195 lb 5.2 oz)   06/11/23 1207 129/78 97.4  F (36.3  C) Temporal 101 20 98 % --        Physical Exam  Vitals and nursing note reviewed.   Constitutional:       General: He is not in acute distress.     Appearance: Normal appearance. He is not ill-appearing.   HENT:      Head: Normocephalic and atraumatic.      Right Ear: External ear normal.      Left Ear: External ear normal.      Nose: Nose normal.   Eyes:      Extraocular Movements:  "Extraocular movements intact.      Conjunctiva/sclera: Conjunctivae normal.      Pupils: Pupils are equal, round, and reactive to light.   Cardiovascular:      Rate and Rhythm: Normal rate and regular rhythm.      Heart sounds: No murmur heard.  Pulmonary:      Effort: Pulmonary effort is normal. No respiratory distress.      Breath sounds: No wheezing, rhonchi or rales.   Abdominal:      General: Abdomen is flat. There is no distension.      Palpations: Abdomen is soft.      Tenderness: There is no abdominal tenderness. There is no guarding or rebound.   Musculoskeletal:         General: No swelling or deformity.      Cervical back: Normal range of motion and neck supple.   Skin:     General: Skin is warm and dry.      Findings: No rash.   Neurological:      Mental Status: He is alert.      Cranial Nerves: Dysarthria (mild) and facial asymmetry (Mild right-sided facial droop) present.      Sensory: Sensation is intact.      Motor: No weakness or pronator drift.      Comments: + Expressive aphasia, patient answering \"yeah\" to all questions           Emergency Department Course   ECG  ECG taken at 1244, ECG read at 1457  Normal sinus rhythm  RSR or QR pattern in V1 suggests right ventricular conduction delay  Borderline ECG   Rate 75 bpm. NE interval 138 ms. QRS duration 106 ms. QT/QTc 380/424 ms. P-R-T axes 52 45 52.     Imaging:  CT Head Perfusion w Contrast - For Tier 2 Stroke   Final Result   IMPRESSION:       CT PERFUSION:   1.  Large volume abnormal perfusion within the left hemisphere MCA distribution with penumbra.      CTA Head Neck with Contrast   Final Result   IMPRESSION:       HEAD CTA:    1.  Complete occlusion of the left internal carotid artery with a fairly robust collateral reperfusion to the left cerebral hemisphere.      2.  Asymmetric posterior division MCA branch on left raising the possibility of ostial obstruction.      NECK CTA:   1.  Occlusion of the proximal left common carotid artery. " Meniscus is present raising concern for intraluminal thrombus.      2.  Findings discussed with Dr. Coffman in the emergency department at [facility] on 06/11/2023 at 1242 hours.      CT Head w/o Contrast   Final Result   IMPRESSION:   1.  Evidence of early infarct in the left MCA distribution involving the left insula.      2.  Findings discussed with Dr. Coffman in the emergency department 06/11/2023 at 1238 hours.         Report per radiology    Laboratory:  Labs Ordered and Resulted from Time of ED Arrival to Time of ED Departure   BASIC METABOLIC PANEL - Abnormal       Result Value    Sodium 137      Potassium 3.7      Chloride 100      Carbon Dioxide (CO2) 26      Anion Gap 11      Urea Nitrogen 16.7      Creatinine 0.81      Calcium 9.0      Glucose 104 (*)     GFR Estimate >90     GLUCOSE BY METER - Normal    GLUCOSE BY METER POCT 91     INR - Normal    INR 0.91     PARTIAL THROMBOPLASTIN TIME - Normal    aPTT 26     TROPONIN T, HIGH SENSITIVITY - Normal    Troponin T, High Sensitivity 6     CBC WITH PLATELETS AND DIFFERENTIAL    WBC Count 6.4      RBC Count 4.43      Hemoglobin 14.1      Hematocrit 41.3      MCV 93      MCH 31.8      MCHC 34.1      RDW 12.6      Platelet Count 179      % Neutrophils 63      % Lymphocytes 27      % Monocytes 8      % Eosinophils 1      % Basophils 1      % Immature Granulocytes 0      NRBCs per 100 WBC 0      Absolute Neutrophils 4.1      Absolute Lymphocytes 1.7      Absolute Monocytes 0.5      Absolute Eosinophils 0.0      Absolute Basophils 0.0      Absolute Immature Granulocytes 0.0      Absolute NRBCs 0.0     GLUCOSE MONITOR NURSING POCT     Emergency Department Course & Assessments:       Interventions:  Medications   aspirin (ASA) tablet 325 mg (325 mg Oral $Given 6/11/23 1308)   clopidogrel (PLAVIX) tablet 75 mg (has no administration in time range)   sodium chloride for CT scan flush use (95 mLs Intravenous $Given 6/11/23 1231)   iopamidol (ISOVUE-370) solution  500 mL (125 mLs Intravenous $Given 6/11/23 1231)   clopidogrel (PLAVIX) tablet 300 mg (300 mg Oral $Given 6/11/23 1309)   ALPRAZolam (XANAX) tablet 0.5 mg (0.5 mg Oral $Given 6/11/23 1503)        Assessments:  1210 I entered the patient's room and obtained history.  1213 Tier 2 code stroke called.  1427 I rechecked the patient and explained findings.    Independent Interpretation (X-rays, CTs, rhythm strip):  None    Consultations/Discussion of Management or Tests:  1218    I consulted with stroke neurology, regarding the patient's history and presentation here in the emergency department.   1237   I consulted with Dr. Phalen, radiology, regarding the patient's history and presentation here in the emergency department.  1244    I consulted with stroke neurology.  1300    I consulted with Dr. Phalen, radiology.  1303    I consulted with stroke neurology.  1329    I consulted with stroke neurology.  1418  I consulted with Dr. Phillips, vascular surgery, regarding the patient's history and presentation here in the emergency department.  1423 I consulted with stroke neurology.  1449 I consulted with Dr. Reyes, hospitalist, regarding the patient's history and presentation here in the emergency department who accepted the patient for admission.       Social Determinants of Health affecting care:   None    Disposition:  The patient was admitted to the hospital under the care of Dr. Reyes.     Impression & Plan    CMS Diagnoses: The patient has stroke symptoms:         ED Stroke specific documentation           NIHSS PDF     Patient last known well time: 1900 yesterday  ED Provider first to bedside at: 1212  CT Results received at: 1242    Thrombolytics:   Not given due to:   - Time since onset of 15 hours    If treating with thrombolytics: Ensure SBP<180 and DBP<105 prior to treatment with thrombolytics.  Administering thrombolytics after treatment with IV labetalol, hydralazine, or nicardipine is reasonable once BP control  is established.    Endovascular Retrieval:  Proximal vessel occlusion present, but endovascular treatment not initiated due to Stroke neurology reviewing the imaging and advising against clot retrieval    [employer] Stroke Scale (Baseline)  Time Performed: 1212     Score    Level of consciousness: (0)   Alert, keenly responsive    LOC questions: (2)   Answers neither question correctly    LOC commands: (0)   Performs both tasks correctly    Best gaze: (0)   Normal    Visual: (0)   No visual loss    Facial palsy: (1)   Minor paralysis (flat nasolabial fold, smile asymmetry)    Motor arm (left): (0)   No drift    Motor arm (right): (0)   No drift    Motor leg (left): (0)   No drift    Motor leg (right): (0)   No drift    Limb ataxia: (0)   Absent    Sensory: (0)   Normal- no sensory loss    Best language: (2)   Severe aphasia    Dysarthria: (1)   Mild to moderate dysarthria    Extinction and inattention: (0)   No abnormality        Total Score:  6        Stroke Mimics were considered (including migraine headache, seizure disorder, hypoglycemia (or hyperglycemia), head or spinal trauma, CNS infection, Toxin ingestion and shock state (e.g. sepsis) .    Medical Decision Makin-year-old male presenting with aphasia and dysarthria with right-sided facial droop that started at 7 PM last night.  Presentation is highly concerning for an acute stroke.  Patient is not a candidate for TNK given the time since onset of his symptoms.  A tier 2 stroke code was called and CT/CTA were performed.  I discussed with the radiologist who advised that the left common carotid was occluded as well as the left internal carotid.  He questioned whether there may be an acute thrombus as well.  I discussed with stroke neurology, Ellen Jacobs PA-C, who reviewed the imaging and case with her attending.  They advised that this case would not be amenable to endovascular intervention according to their interpretation of the  imaging.  They requested that I discussed the vascular imaging with vascular surgery.  I discussed the imaging with Dr. Phillips with vascular surgery.  She advises that they would not do any emergent intervention on the patient's carotid artery.  I updated stroke neurology on the recommendations.  We will plan to admit to the hospital here at [facility].  Patient was given aspirin and Plavix.  Discussed with Dr. Reyes who accepted the admission.  I went back to reevaluate the patient and his speech has somewhat improved.  He is still displaying some dysarthria but his aphasia has significantly improved since arrival.  He and his wife were updated on the findings and the plan moving forward.      Diagnosis:    ICD-10-CM    1. Cerebrovascular accident (CVA), unspecified mechanism (H)  I63.9       2. Occlusion of left carotid artery  I65.22          Scribe Disclosure:  Carisa ZULETA Hired, am serving as a scribe at 12:27 PM on 6/11/2023 to document services personally performed by Frank Box MD based on my observations and the provider's statements to me.     6/11/2023   Frank Box MD Goodwin, Shaun M, MD  06/11/23 [id number]

## 2023-06-12 ENCOUNTER — APPOINTMENT (OUTPATIENT)
Dept: PHYSICAL THERAPY | Facility: CLINIC | Age: 58
End: 2023-06-12
Attending: HOSPITALIST
Payer: COMMERCIAL

## 2023-06-12 ENCOUNTER — APPOINTMENT (OUTPATIENT)
Dept: CARDIOLOGY | Facility: CLINIC | Age: 58
End: 2023-06-12
Attending: HOSPITALIST
Payer: COMMERCIAL

## 2023-06-12 ENCOUNTER — APPOINTMENT (OUTPATIENT)
Dept: SPEECH THERAPY | Facility: CLINIC | Age: 58
End: 2023-06-12
Attending: HOSPITALIST
Payer: COMMERCIAL

## 2023-06-12 ENCOUNTER — APPOINTMENT (OUTPATIENT)
Dept: OCCUPATIONAL THERAPY | Facility: CLINIC | Age: 58
End: 2023-06-12
Attending: HOSPITALIST
Payer: COMMERCIAL

## 2023-06-12 VITALS
HEIGHT: 71 IN | SYSTOLIC BLOOD PRESSURE: 158 MMHG | HEART RATE: 73 BPM | OXYGEN SATURATION: 96 % | TEMPERATURE: 97.6 F | BODY MASS INDEX: 27.35 KG/M2 | WEIGHT: 195.33 LBS | DIASTOLIC BLOOD PRESSURE: 66 MMHG | RESPIRATION RATE: 18 BRPM

## 2023-06-12 LAB
ANION GAP SERPL CALCULATED.3IONS-SCNC: 7 MMOL/L (ref 7–15)
ATRIAL RATE - MUSE: 75 BPM
BUN SERPL-MCNC: 19.3 MG/DL (ref 6–20)
CALCIUM SERPL-MCNC: 8.5 MG/DL (ref 8.6–10)
CHLORIDE SERPL-SCNC: 105 MMOL/L (ref 98–107)
CREAT SERPL-MCNC: 0.8 MG/DL (ref 0.67–1.17)
DEPRECATED HCO3 PLAS-SCNC: 26 MMOL/L (ref 22–29)
DIASTOLIC BLOOD PRESSURE - MUSE: NORMAL MMHG
ERYTHROCYTE [DISTWIDTH] IN BLOOD BY AUTOMATED COUNT: 12.5 % (ref 10–15)
GFR SERPL CREATININE-BSD FRML MDRD: >90 ML/MIN/1.73M2
GLUCOSE BLDC GLUCOMTR-MCNC: 130 MG/DL (ref 70–99)
GLUCOSE SERPL-MCNC: 126 MG/DL (ref 70–99)
HCT VFR BLD AUTO: 39.8 % (ref 40–53)
HGB BLD-MCNC: 13.3 G/DL (ref 13.3–17.7)
INTERPRETATION ECG - MUSE: NORMAL
MCH RBC QN AUTO: 31.3 PG (ref 26.5–33)
MCHC RBC AUTO-ENTMCNC: 33.4 G/DL (ref 31.5–36.5)
MCV RBC AUTO: 94 FL (ref 78–100)
P AXIS - MUSE: 52 DEGREES
PLATELET # BLD AUTO: 177 10E3/UL (ref 150–450)
POTASSIUM SERPL-SCNC: 4.2 MMOL/L (ref 3.4–5.3)
PR INTERVAL - MUSE: 138 MS
QRS DURATION - MUSE: 106 MS
QT - MUSE: 380 MS
QTC - MUSE: 424 MS
R AXIS - MUSE: 45 DEGREES
RBC # BLD AUTO: 4.25 10E6/UL (ref 4.4–5.9)
SODIUM SERPL-SCNC: 138 MMOL/L (ref 136–145)
SYSTOLIC BLOOD PRESSURE - MUSE: NORMAL MMHG
T AXIS - MUSE: 52 DEGREES
VENTRICULAR RATE- MUSE: 75 BPM
WBC # BLD AUTO: 6.8 10E3/UL (ref 4–11)

## 2023-06-12 PROCEDURE — 250N000013 HC RX MED GY IP 250 OP 250 PS 637: Performed by: HOSPITALIST

## 2023-06-12 PROCEDURE — 250N000013 HC RX MED GY IP 250 OP 250 PS 637: Performed by: STUDENT IN AN ORGANIZED HEALTH CARE EDUCATION/TRAINING PROGRAM

## 2023-06-12 PROCEDURE — 92523 SPEECH SOUND LANG COMPREHEN: CPT | Mod: GN | Performed by: SPEECH-LANGUAGE PATHOLOGIST

## 2023-06-12 PROCEDURE — 97161 PT EVAL LOW COMPLEX 20 MIN: CPT | Mod: GP

## 2023-06-12 PROCEDURE — 36415 COLL VENOUS BLD VENIPUNCTURE: CPT | Performed by: HOSPITALIST

## 2023-06-12 PROCEDURE — 92610 EVALUATE SWALLOWING FUNCTION: CPT | Mod: GN | Performed by: SPEECH-LANGUAGE PATHOLOGIST

## 2023-06-12 PROCEDURE — 99239 HOSP IP/OBS DSCHRG MGMT >30: CPT | Performed by: STUDENT IN AN ORGANIZED HEALTH CARE EDUCATION/TRAINING PROGRAM

## 2023-06-12 PROCEDURE — 97530 THERAPEUTIC ACTIVITIES: CPT | Mod: GO

## 2023-06-12 PROCEDURE — 97165 OT EVAL LOW COMPLEX 30 MIN: CPT | Mod: GO

## 2023-06-12 PROCEDURE — 258N000001 HC RX 258: Performed by: HOSPITALIST

## 2023-06-12 PROCEDURE — 999N000208 ECHOCARDIOGRAM COMPLETE

## 2023-06-12 PROCEDURE — G0426 INPT/ED TELECONSULT50: HCPCS | Mod: G0 | Performed by: NURSE PRACTITIONER

## 2023-06-12 PROCEDURE — 93306 TTE W/DOPPLER COMPLETE: CPT | Mod: 26 | Performed by: INTERNAL MEDICINE

## 2023-06-12 PROCEDURE — 258N000003 HC RX IP 258 OP 636: Performed by: HOSPITALIST

## 2023-06-12 PROCEDURE — 82310 ASSAY OF CALCIUM: CPT | Performed by: HOSPITALIST

## 2023-06-12 PROCEDURE — 85027 COMPLETE CBC AUTOMATED: CPT | Performed by: HOSPITALIST

## 2023-06-12 PROCEDURE — 92526 ORAL FUNCTION THERAPY: CPT | Mod: GN | Performed by: SPEECH-LANGUAGE PATHOLOGIST

## 2023-06-12 PROCEDURE — 93306 TTE W/DOPPLER COMPLETE: CPT

## 2023-06-12 RX ORDER — NALOXONE HYDROCHLORIDE 0.4 MG/ML
0.4 INJECTION, SOLUTION INTRAMUSCULAR; INTRAVENOUS; SUBCUTANEOUS
Status: DISCONTINUED | OUTPATIENT
Start: 2023-06-12 | End: 2023-06-12 | Stop reason: HOSPADM

## 2023-06-12 RX ORDER — HYDROCODONE BITARTRATE AND ACETAMINOPHEN 5; 325 MG/1; MG/1
1 TABLET ORAL EVERY 6 HOURS PRN
Status: DISCONTINUED | OUTPATIENT
Start: 2023-06-12 | End: 2023-06-12 | Stop reason: HOSPADM

## 2023-06-12 RX ORDER — CLOPIDOGREL BISULFATE 75 MG/1
75 TABLET ORAL DAILY
Qty: 90 TABLET | Refills: 0 | Status: SHIPPED | OUTPATIENT
Start: 2023-06-13 | End: 2024-06-13

## 2023-06-12 RX ORDER — HYDROXYZINE HYDROCHLORIDE 25 MG/1
25 TABLET, FILM COATED ORAL 3 TIMES DAILY PRN
Status: DISCONTINUED | OUTPATIENT
Start: 2023-06-12 | End: 2023-06-12 | Stop reason: ALTCHOICE

## 2023-06-12 RX ORDER — NALOXONE HYDROCHLORIDE 0.4 MG/ML
0.2 INJECTION, SOLUTION INTRAMUSCULAR; INTRAVENOUS; SUBCUTANEOUS
Status: DISCONTINUED | OUTPATIENT
Start: 2023-06-12 | End: 2023-06-12 | Stop reason: HOSPADM

## 2023-06-12 RX ORDER — ASPIRIN 325 MG
325 TABLET, DELAYED RELEASE (ENTERIC COATED) ORAL DAILY
Qty: 90 TABLET | Refills: 3 | Status: SHIPPED | OUTPATIENT
Start: 2023-06-13 | End: 2024-06-13

## 2023-06-12 RX ORDER — ACYCLOVIR 200 MG/1
30 CAPSULE ORAL ONCE
Status: COMPLETED | OUTPATIENT
Start: 2023-06-12 | End: 2023-06-12

## 2023-06-12 RX ORDER — QUETIAPINE FUMARATE 50 MG/1
50-100 TABLET, FILM COATED ORAL AT BEDTIME
Status: DISCONTINUED | OUTPATIENT
Start: 2023-06-12 | End: 2023-06-12 | Stop reason: HOSPADM

## 2023-06-12 RX ORDER — DULOXETIN HYDROCHLORIDE 60 MG/1
60 CAPSULE, DELAYED RELEASE ORAL DAILY
Status: DISCONTINUED | OUTPATIENT
Start: 2023-06-12 | End: 2023-06-12 | Stop reason: HOSPADM

## 2023-06-12 RX ORDER — NICOTINE 21-14-7MG
1 KIT TRANSDERMAL DAILY
Qty: 1 KIT | Refills: 0 | Status: SHIPPED | OUTPATIENT
Start: 2023-06-12 | End: 2023-11-11

## 2023-06-12 RX ORDER — ATORVASTATIN CALCIUM 80 MG/1
80 TABLET, FILM COATED ORAL EVERY EVENING
Qty: 90 TABLET | Refills: 0 | Status: SHIPPED | OUTPATIENT
Start: 2023-06-12 | End: 2024-06-13

## 2023-06-12 RX ADMIN — CLOPIDOGREL BISULFATE 75 MG: 75 TABLET ORAL at 09:14

## 2023-06-12 RX ADMIN — SODIUM CHLORIDE 30 ML: 9 INJECTION, SOLUTION INTRAMUSCULAR; INTRAVENOUS; SUBCUTANEOUS at 12:32

## 2023-06-12 RX ADMIN — HYDROXYZINE HYDROCHLORIDE 25 MG: 25 TABLET, FILM COATED ORAL at 09:15

## 2023-06-12 RX ADMIN — ASPIRIN 325 MG: 325 TABLET, COATED ORAL at 09:14

## 2023-06-12 RX ADMIN — DULOXETINE HYDROCHLORIDE 60 MG: 60 CAPSULE, DELAYED RELEASE ORAL at 09:15

## 2023-06-12 RX ADMIN — SODIUM CHLORIDE: 9 INJECTION, SOLUTION INTRAVENOUS at 02:52

## 2023-06-12 RX ADMIN — HYDROCODONE BITARTRATE AND ACETAMINOPHEN 1 TABLET: 5; 325 TABLET ORAL at 13:11

## 2023-06-12 RX ADMIN — HYDROCODONE BITARTRATE AND ACETAMINOPHEN 1 TABLET: 5; 325 TABLET ORAL at 06:22

## 2023-06-12 ASSESSMENT — ACTIVITIES OF DAILY LIVING (ADL)
ADLS_ACUITY_SCORE: 25
ADLS_ACUITY_SCORE: 22
ADLS_ACUITY_SCORE: 25
ADLS_ACUITY_SCORE: 22
ADLS_ACUITY_SCORE: 25
ADLS_ACUITY_SCORE: 25
ADLS_ACUITY_SCORE: 22

## 2023-06-12 NOTE — PROGRESS NOTES
Admitted to floor, wife at bedside. A&O x4. Neuros intact. Up SBA. BP slightly elevated. NPO.    Eloise Bautista RN

## 2023-06-12 NOTE — PROGRESS NOTES
06/12/23 1154   Appointment Info   Signing Clinician's Name / Credentials (PT) Charity Lee DPT   Living Environment   People in Home spouse;child(zaida), adult;grandchild(zaida)   Current Living Arrangements house   Home Accessibility stairs to enter home   Number of Stairs, Main Entrance 1   Stair Railings, Main Entrance none   Transportation Anticipated car, drives self;family or friend will provide   Living Environment Comments Pt and spouse report currently living in apartment. 1 SARA then all needs met on main floor. Walk in shower and standard toilet.   Self-Care   Usual Activity Tolerance good   Current Activity Tolerance good   Regular Exercise No   Equipment Currently Used at Home none   Fall history within last six months no   Activity/Exercise/Self-Care Comment Pt reports IND at baseline without AD for mobility and ADLs.   General Information   Onset of Illness/Injury or Date of Surgery 06/11/23   Referring Physician Shai Reyes MD   Patient/Family Therapy Goals Statement (PT) return home   Pertinent History of Current Problem (include personal factors and/or comorbidities that impact the POC) Dinesh Farfan is a 57 year old male with a past medical history of tobacco use disorder, colon cancer previously on chemotherapy that has been completed who presents with confusion/slurred speech since 6/9/23. CT of the head showed evidence of early infarct in the left MCA distribution involving the left insula without hemorrhage.  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   Existing Precautions/Restrictions fall   Cognition   Affect/Mental Status (Cognition) WNL   Orientation Status (Cognition) oriented x 4   Follows Commands (Cognition) WNL   Pain Assessment   Patient Currently in Pain Yes, see Vital Sign flowsheet   Integumentary/Edema    Integumentary/Edema no deficits were identifed   Posture    Posture Forward head position   Range of Motion (ROM)   Range of Motion ROM is WFL   Strength (Manual Muscle Testing)   Strength (Manual Muscle Testing) strength is WFL   Bed Mobility   Comment, (Bed Mobility) supine<>sit IND   Transfers   Comment, (Transfers) sit<>stand IND   Gait/Stairs (Locomotion)   Negotiation (Stairs) stairs independence;handrail location;number of steps   [place] Level (Stairs) independent   Handrail Location (Stairs) none   Number of Steps (Stairs) 3   Comment, (Gait/Stairs) amb with SBA initially then IND, no AD, mild lateral shift bilaterally with gait, pt reports this per baseline   Balance   Balance Comments steady without AD   Clinical Impression   Criteria for Skilled Therapeutic Intervention Evaluation only   PT Diagnosis (PT) CVA   Influenced by the following impairments no impairments identified   Functional limitations due to impairments no deficits with functional mobility   Clinical Presentation (PT Evaluation Complexity) Stable/Uncomplicated   Clinical Presentation Rationale clinical judgement   Clinical Decision Making (Complexity) low complexity   Anticipated Equipment Needs at Discharge (PT)   (none)   Risk & Benefits of therapy have been explained evaluation/treatment results reviewed;care plan/treatment goals reviewed;risks/benefits reviewed;current/potential barriers reviewed;participants voiced agreement with care plan;participants included;patient;spouse/significant other   PT Total Evaluation Time   PT Eval, Low Complexity Minutes (68029) 12   Physical Therapy Goals   PT Frequency One time eval and treatment only   PT Predicted Duration/Target Date for Goal Attainment 06/12/23   PT Goals Bed Mobility;Transfers;Gait;Stairs   PT: Bed Mobility Independent;Supine to/from sit;Goal Met   PT: Transfers Independent;Sit to/from stand;Goal Met   PT: Gait Independent;Greater than 200 feet;Goal Met   PT: Stairs  Independent;1 stair;Goal Met   PT Discharge Planning   PT Plan PT: d/c   PT Discharge Recommendation (DC Rec) home   PT Rationale for DC Rec Patient appears to be at baseline functional mobiltiy. No deficits identified with strength, balance, coordination, sensation, or functioanl mobiltiy. Pt is IND with bed mobility, transfers, ambulation, and stairs. Anticipate able to return home upon medical clearance; no further PT needs.   PT Brief overview of current status IND   Total Session Time   Total Session Time (sum of timed and untimed services) 12

## 2023-06-12 NOTE — PROGRESS NOTES
At 1630, nurse reviewed discharge instructions with patient and spouse. Questions answered. Patient discharged to home with wife, medication Aspirin, Atorvastatin, Clopidogrel, Nicotine patch; discharge instructions, and belongings. Patient walk out to the hospital with wife.

## 2023-06-12 NOTE — PLAN OF CARE
Pertinent assessments: Pt A&Ox4. VSS, on RA. BP slightly elevated. Up SBA with IV pool. C/o back pain, lidocaine patch on. Denied nausea. PIV running NS 75. BM x1 this shift. LS clear, infrequent cough. BS active. Neuro checks intact. On tele, SR. Family at bedside. Slept well.  Major Shift Events MRI done this shift  Treatment Plan: Neuro checks q2, monitoring symptoms. ECHO this morning. HOB flat. Aspirin.   Bedside Nurse: Crista Brown RN

## 2023-06-12 NOTE — DISCHARGE SUMMARY
"[facility]  Hospitalist Discharge Summary      Date of Admission:  6/11/2023  Date of Discharge:  6/12/2023  Discharging Provider: Everton Arndt MD  Discharge Service: Hospitalist Service    Discharge Diagnoses       Left MCA territory stroke    Anxiety disorder    Colon cancer    BPH      Clinically Significant Risk Factors     # Overweight: Estimated body mass index is 27.4 kg/m  as calculated from the following:    Height as of this encounter: 1.798 m (5' 10.8\").    Weight as of this encounter: 88.6 kg (195 lb 5.2 oz).       Follow-ups Needed After Discharge   Follow-up Appointments     Follow-up and recommended labs and tests       Follow up with primary care provider, MIGUEL A MANCERA, within 7 days   for hospital follow- up.   Repeat gadoterate ultrasound in 3 months to   evaluate for stability of R ICA (currently ~20% stenosis) and discussed   with the primary doctor to arrange this sleep apnea evaluation    Follow-up with neurology in 4 to 6 weeks.  Check lipid panel in 2 months.               Discharge Disposition   Discharged to home  Condition at discharge: Stable    Hospital Course   57 year old male with a past medical history of tobacco use disorder, colon cancer previously on chemotherapy that has been completed who presents with confusion/slurred speech and word finding difficulties at approximately 1900 on 6/10 but patient's spouse noticed speech changes on 6/9.     CT of the head showed evidence of early infarct in the left MCA distribution involving the left insula without hemorrhage.  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 MRI brain confirmed acute/subacute left MCA territory ischemic injury without hemorrhagic conversion.        1. Left MCA territory stroke.    Stroke neurology consult appreciated.    Will " need aspirin and Plavix for 3 months followed by aspirin alone.  Started on atorvastatin 80 mg daily and then continue recheck LDL.  Echocardiogram with bubble study showed normal EF and no right-to-left shunt.  LDL was 92 with HDL 44.  HbA1c was 6.    Will need outpatient SLP consult.    Will need outpatient carotid ultrasound and follow-up with neurology.    Will need antihypertensives but not acutely.  Will defer to primary physician.     2. Anxiety disorder.    Resume Seroquel, Cymbalta and as needed Atarax.     3. Colon cancer.    Underwent right hemicolectomy for grade 2 adenocarcinoma on 11/23/2020.      He completed 6 cycles of Xeloda and he has colonoscopy due 11/2024.       4. Nicotine dependence.  Will discharge on nicotine patch.      5. BPH    Hytrin to be continued.    Consultations This Hospital Stay   PATIENT LEARNING CENTER IP CONSULT  NEUROLOGY IP STROKE CONSULT  SPEECH LANGUAGE PATH ADULT IP CONSULT  PHARMACY IP CONSULT  PHARMACY IP CONSULT  PHARMACY IP CONSULT  PHYSICAL THERAPY ADULT IP CONSULT  OCCUPATIONAL THERAPY ADULT IP CONSULT  REHAB ADMISSIONS LIAISON IP CONSULT  CARE MANAGEMENT / SOCIAL WORK IP CONSULT  SMOKING CESSATION PROGRAM IP CONSULT    Code Status   Full Code    Time Spent on this Encounter   I, Everton Arndt MD, personally saw the patient today and spent greater than 30 minutes discharging this patient.       Everton Arndt MD  [place] [address] [facility]  [address] [place] [address]  Phone: [phone]  Fax: [phone]  ______________________________________________________________________    Physical Exam   Vital Signs: Temp: 97.4  F (36.3  C) Temp src: Oral BP: (!) 155/78 Pulse: 64   Resp: 16 SpO2: 96 % O2 Device: None (Room air)    Weight: 195 lbs 5.24 oz  Alert, awake and oriented x3.  Has some word finding difficulty.  Chest clear to auscultation.  Heart: S1 and S2 is normal.  Abdomen is soft and nontender.       Primary Care Physician    MIGUEL A MANCERA    Discharge Orders      US Carotid Bilateral     Speech Therapy Referral      Occupational Therapy Referral      Adult Sleep Eval & Management Referral      Speech Therapy Referral      Reason for your hospital stay    Acute ischemic stroke.     Activity    Your activity upon discharge: activity as tolerated     Discharge Instructions    Quit smoking     Follow-up and recommended labs and tests     Follow up with primary care provider, MIGUEL A MANCERA, within 7 days for hospital follow- up.   Repeat gadoterate ultrasound in 3 months to evaluate for stability of R ICA (currently ~20% stenosis) and discussed with the primary doctor to arrange this sleep apnea evaluation    Follow-up with neurology in 4 to 6 weeks.  Check lipid panel in 2 months.     Diet    Follow this diet upon discharge: Orders Placed This Encounter      Combination Diet Regular Diet     Stroke Hospital Follow Up    Please be aware that coverage of these services is subject to the terms and limitations of your health insurance plan.  Call member services at your health plan with any benefit or coverage questions.  [employer] will call you to coordinate care as prescribed by your provider. If you don t hear from a representative within 2 business days, please call [phone].         Significant Results and Procedures   Most Recent 3 CBC's:Recent Labs   Lab Test 06/12/23  0610 06/11/23  1220   WBC 6.8 6.4   HGB 13.3 14.1   MCV 94 93    179     Most Recent 3 BMP's:Recent Labs   Lab Test 06/12/23  0610 06/12/23  0024 06/11/23  2228 06/11/23  1220     --   --  137   POTASSIUM 4.2  --   --  3.7   CHLORIDE 105  --   --  100   CO2 26  --   --  26   BUN 19.3  --   --  16.7   CR 0.80  --   --  0.81   ANIONGAP 7  --   --  11   DUSTIN 8.5*  --   --  9.0   * 130* 116* 104*     Most Recent 2 LFT's:No lab results found.,   Results for orders placed or performed during the hospital encounter of 06/11/23   CT Head  w/o Contrast    Narrative    EXAM: CT HEAD W/O CONTRAST  LOCATION: [facility]  DATE/TIME: 6/11/2023 12:25 PM CDT    INDICATION: Slurred speech and confusion. Right facial droop.  COMPARISON: None.  TECHNIQUE: Routine CT Head without IV contrast. Multiplanar reformats. Dose reduction techniques were used.    FINDINGS:  INTRACRANIAL CONTENTS: No intracranial hemorrhage, extraaxial collection, or mass effect.  Parenchymal low-attenuation and localized swelling involving the left insula. This measures 2 cm in diameter. Mild localized mass effect. No hemorrhage.   Curvilinear calcification near the region of the left MCA trifurcation could represent calcified embolus. There are also a few calcifications posteriorly which are indeterminate. No midline shift. Negative for hemorrhage. No hydrocephalus. Basal cisterns   intact.    VISUALIZED ORBITS/SINUSES/MASTOIDS: No intraorbital abnormality. Retention cyst in the left maxillary sinus. No middle ear or mastoid effusion.    BONES/SOFT TISSUES: No acute abnormality.      Impression    IMPRESSION:  1.  Evidence of early infarct in the left MCA distribution involving the left insula.    2.  Findings discussed with Dr. Coffman in the emergency department 06/11/2023 at 1238 hours.   CTA Head Neck with Contrast    Narrative    EXAM: CTA HEAD NECK W CONTRAST  LOCATION: [facility]  DATE/TIME: 6/11/2023 12:31 PM CDT    INDICATION: Code Stroke to evaluate for potential thrombolysis and thrombectomy. PLEASE READ IMMEDIATELY. Right facial droop. Slurred speech and confusion  COMPARISON: None.  CONTRAST: 75mL Isovue 370  TECHNIQUE: Head and neck CT angiogram with IV contrast. axial helical CT images of the head and neck vessels obtained during the arterial phase of intravenous contrast administration. Axial 2D reconstructed images and multiplanar 3D MIP reconstructed   images of the head and neck vessels were performed by the technologist.  Dose reduction techniques were used. All stenosis measurements made according to NASCET criteria unless otherwise specified.    FINDINGS:     HEAD CTA:  ANTERIOR CIRCULATION: Complete occlusion of the left internal carotid artery. There is collateral reperfusion of the left ICA terminus, left anterior middle cerebral arteries and branches. Fairly robust collateral flow is present. Negative for aneurysm   or vascular malformation. Asymmetry of the posterior division M2 segment of the left MCA noted raising the possibility of ostial occlusion. Calcified plaque in the left carotid siphon.      POSTERIOR CIRCULATION: No stenosis/occlusion, aneurysm, or high flow vascular malformation. Balanced vertebral arteries supply a normal basilar artery.     DURAL VENOUS SINUSES: Expected enhancement of the major dural venous sinuses.    NECK CTA:  RIGHT CAROTID: 20% stenosis at the ICA origin with calcified and noncalcified plaque. No evidence for dissection.    LEFT CAROTID: Complete occlusion of the left common and internal carotid arteries. Heavily calcified plaque at the origin of the left internal carotid artery. Question intraluminal thrombus at the proximal left common carotid artery near the origin of   the vessel contiguous with the included segment and raising the possibility of superimposed acute thrombus. This is best seen on images 60-80 of series 6.    VERTEBRAL ARTERIES: No focal stenosis or dissection. Balanced vertebral arteries.    AORTIC ARCH: Brachiocephalic artery origin widely patent. Mild stenosis of the left subclavian artery origin with about 25% narrowing. Calcified and noncalcified plaque. The origin of the left common carotid artery is patent however occlusion of the   proximal portion of this vessel is present with question of intraluminal thrombus and a meniscus near the arch origin.    NONVASCULAR STRUCTURES: Mild-to-moderate cervical spondylosis.      Impression    IMPRESSION:     HEAD CTA:   1.   Complete occlusion of the left internal carotid artery with a fairly robust collateral reperfusion to the left cerebral hemisphere.    2.  Asymmetric posterior division MCA branch on left raising the possibility of ostial obstruction.    NECK CTA:  1.  Occlusion of the proximal left common carotid artery. Meniscus is present raising concern for intraluminal thrombus.    2.  Findings discussed with Dr. Coffman in the emergency department at [facility] on 06/11/2023 at 1242 hours.   CT Head Perfusion w Contrast - For Tier 2 Stroke    Narrative    EXAM: CT HEAD PERFUSION W CONTRAST  LOCATION: [facility]  DATE/TIME: 6/11/2023 12:41 PM CDT    INDICATION: Code Stroke to evaluate for potential thrombolysis and thrombectomy. Evaluate mismatch between penumbra and core infarct. READ IMMEDIATELY.  COMPARISON: None.  TECHNIQUE: CT cerebral perfusion was performed utilizing a second contrast bolus. Perfusion data were post processed with generation of standard perfusion maps and estimation of ischemic/infarcted volumes utilizing standard threshold values. Dose   reduction techniques were used. All stenosis measurements made according to NASCET criteria unless otherwise specified.  CONTRAST: 50mL Isovue 370    FINDINGS:     RAPID ANALYSIS:  CBF<30%: Less than one third of the affected left MCA vascular territory  Tmax>6sec: Greater than two thirds of the affected vascular territory  Mismatch volume: More than one third of the affected vascular territory        Impression    IMPRESSION:     CT PERFUSION:  1.  Large volume abnormal perfusion within the left hemisphere MCA distribution with penumbra.   MR Brain w/o & w Contrast    Narrative    EXAM: MR BRAIN W/O AND W CONTRAST  LOCATION: [facility]  DATE/TIME: 6/11/2023 10:15 PM CDT    INDICATION: Acute CVA.  COMPARISON: CTA head neck from the same date.  CONTRAST: 8 mL Gadavist.  TECHNIQUE: Routine multiplanar multisequence head  MRI without and with intravenous contrast.    FINDINGS:  INTRACRANIAL CONTENTS: There is scattered abnormal diffusion restriction identified within the left insula, left basal ganglia, left frontal lobe and left corona radiata, in keeping with an acute/subacute left MCA territory regions of ischemic injury.   There is associated T2/FLAIR signal hyperintensity in these regions. No hemorrhagic conversion identified at this time. No significant mass effect. No midline shift. No mass, acute hemorrhage, or extra-axial fluid collections. Normal ventricles and   sulci. Normal position of the cerebellar tonsils. No pathologic contrast enhancement.    SELLA: No abnormality accounting for technique.    OSSEOUS STRUCTURES/SOFT TISSUES: Normal marrow signal. Loss of flow-related signal identified involving the left petrous, cavernous and supraclinoid ICA.     ORBITS: No abnormality accounting for technique.     SINUSES/MASTOIDS: Mild mucosal thickening scattered about the paranasal sinuses. Trace left mastoid effusion.       Impression    IMPRESSION:  1.  Acute/subacute left MCA territory ischemic injury without hemorrhagic conversion.   Echocardiogram Complete w Bubble Study - For age < 60 yrs    Narrative    [id number]  [id number]  [id number]  [id number]^LYLE^GARTH     [facility]  Echocardiography Laboratory  [address] [place] [address]     Name: SHANIKA EVANS  MRN: [id number]  : 1965  Study Date: 2023 11:51 AM  Age: 57 yrs  Gender: Male  Patient Location: [facility]  Reason For Study: Cerebrovascular Incident  Ordering Physician: GARTH ALVARENGA  Performed By: Tommie Stafford RDCS     BSA: 2.1 m2  Height: 70 in  Weight: 195 lb  HR: 63  BP: 149/73 mmHg  ______________________________________________________________________________  Procedure  Complete Portable Bubble Echo Adult.  ______________________________________________________________________________  Interpretation Summary     No cardiac  source of emboli noted.  ______________________________________________________________________________  Left Ventricle  The left ventricle is normal in structure, function and size.     Right Ventricle  The right ventricle is normal in structure, function and size.     Atria  Normal left atrial size. Right atrial size is normal. A contrast injection  (Bubble Study) was performed that was negative for flow across the interatrial  septum.     Mitral Valve  The mitral valve is normal in structure and function.     Tricuspid Valve  Normal tricuspid valve.     Aortic Valve  The aortic valve is normal in structure and function.     Pulmonic Valve  Normal pulmonic valve.     Vessels  The aortic root is normal size. The inferior vena cava is normal.     Pericardium  There is no pericardial effusion.     ______________________________________________________________________________  MMode/2D Measurements & Calculations  IVSd: 0.84 cm  LVIDd: 4.9 cm  LVIDs: 3.9 cm  LVPWd: 0.87 cm  IVC diam: 1.8 cm  FS: 21.2 %     LV mass(C)d: 143.3 grams  LV mass(C)dI: 69.4 grams/m2  Ao root diam: 3.3 cm  LA dimension: 4.2 cm  asc Aorta Diam: 3.4 cm  LA/Ao: 1.3  LA Volume (BP): 50.9 ml  LA Volume Index (BP): 24.7 ml/m2  RV Base: 3.4 cm  RWT: 0.36  TAPSE: 2.0 cm     Doppler Measurements & Calculations  MV E max garland: 101.0 cm/sec  MV A max garland: 95.9 cm/sec  MV E/A: 1.1  MV dec time: 0.25 sec  Ao V2 max: 150.0 cm/sec  Ao max P.0 mmHg  E/E' av.2  Lateral E/e': 8.8  Medial E/e': 9.6  RV S Garland: 11.9 cm/sec     ______________________________________________________________________________  Report approved by: Gaurav Thorne 2023 02:08 PM               Discharge Medications   Current Discharge Medication List      START taking these medications    Details   aspirin (ASA) 325 MG EC tablet Take 1 tablet (325 mg) by mouth daily  Qty: 90 tablet, Refills: 3    Associated Diagnoses: Cerebrovascular accident (CVA), unspecified mechanism (H)       atorvastatin (LIPITOR) 80 MG tablet Take 1 tablet (80 mg) by mouth every evening  Qty: 90 tablet, Refills: 0    Associated Diagnoses: Cerebrovascular accident (CVA) due to embolism of left middle cerebral artery (H)      clopidogrel (PLAVIX) 75 MG tablet 1 tablet (75 mg) by Oral or NG Tube route daily  Qty: 90 tablet, Refills: 0    Associated Diagnoses: Cerebrovascular accident (CVA) due to embolism of left middle cerebral artery (H)      nicotine 21-14-7 MG/24HR KIT Place 1 patch onto the skin daily  Qty: 1 kit, Refills: 0    Associated Diagnoses: Cigarette nicotine dependence without complication         CONTINUE these medications which have NOT CHANGED    Details   DULoxetine (CYMBALTA) 60 MG capsule Take 60 mg by mouth daily      HYDROcodone-acetaminophen (NORCO)  MG per tablet Take 1 tablet by mouth 4 times daily      hydrOXYzine (ATARAX) 25 MG tablet Take 25 mg by mouth 3 times daily as needed for anxiety      QUEtiapine (SEROQUEL) 50 MG tablet Take  mg by mouth At Bedtime      terazosin (HYTRIN) 10 MG capsule Take 10 mg by mouth daily      traZODone (DESYREL) 50 MG tablet Take  mg by mouth nightly as needed for sleep         STOP taking these medications       methylPREDNISolone (MEDROL DOSEPAK) 4 MG tablet therapy pack Comments:   Reason for Stopping:             Allergies   No Known Allergies

## 2023-06-12 NOTE — PHARMACY
Pharmacy Consult to evaluate for medication related stroke core measures    Dinesh DESIRAE Rodriguezky, 57 year old male admitted for stroke symptoms on 6/11/2023.    Thrombolytic was not given because of Time from onset contraindications    VTE Prophylaxis PCDs ordered    Antithrombotic: aspirin and clopidogrel started on 6/11, as appropriate by end of hospital day 2. Continue antithrombotic therapy on discharge to meet quality measures, unless contraindicated.    Anticoagulation if history of A-fib/flutter: Patient does not have history of A-fib/flutter - anticoagulation not required for medication related stroke core measures.     LDL Cholesterol Calculated   Date Value Ref Range Status   06/11/2023 92 <=100 mg/dL Final       Patient currently receiving Lipitor (atorvastatin) continue statin on discharge to meet quality measures, unless contraindicated.    Recommendations: None at this time    Thank you for the consult.    Sammie Wadsworth [facility] 6/12/2023 3:50 PM

## 2023-06-12 NOTE — PLAN OF CARE
A&Ox4, forgetful at times. Telestroke completed. Q2hr Neuro checks, change this morning from previous charting, neurosurgery aware, no change this shift. Up SBA, showered this morning. PO [place]ett given x1 for 7/10 headache, reports decrease in pain. PO Atarax given x1 for anxiety. Would like nicotine patch, provider notified, no new orders. Stroke education tomorrow at 1130, plan for patient to discharge this afternoon, education team aware and will call patient on phone. Writer gave patient and wife stroke educational handouts and power-point. Will discharge this afternoon with wife.

## 2023-06-12 NOTE — CONSULTS
[facility]    Stroke Consult Note    Reason for Consult:  stroke    Chief Complaint: No chief complaint on file.       HPI  Dinesh Farfan is a 57 year old male with past medical history significant for tobacco use and colon cancer s/p chemotherapy. He presented to the ED 6/11 for evaluation of right facial droop and language impairment that began 6/9. He reports that he began to notice that his words were not coming out normally, but did not have any focal weakness. 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 Vascular surgery was contacted in the ED and recommended no surgical intervention.     Today on exam, he reports improvement in his language production. He is urrently smoking 1 ppd. His wife reports that he snores heavily and sometimes needs to be woken when he stops breathing.     Stroke Evaluation Summarized    MRI/Head CT Acute/subacute left MCA territory ischemic injury without hemorrhagic conversion.   Intracranial Vasculature 1.  Complete occlusion of the left internal carotid artery with a fairly robust collateral reperfusion to the left cerebral hemisphere.  2.  Asymmetric posterior division MCA branch on left raising the possibility of ostial obstruction.   Cervical Vasculature Occlusion of the proximal left common carotid artery. Meniscus is present raising concern for intraluminal thrombus.     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     Recommendations  - DAPT with  mg + Plavix 75 mg for 90 days, then  mg alone indefinitely   - LDL 92, started on Lipitor 80 mg.  "Titrate to goal LDL 40-70 in the outpatient setting  - A1c 6.0, within goal <7.0  - Would not acutely lower BP while inpatient given L CCA/ICA occlusion. Long term goal BP <140/90 with tighter control associated with decreased overall CV risk, if tolerated. Discussed the importance of home BP monitoring and keeping a log for PCP.  - Therapies, as needed  - PLC  - Smoking cessation  - Repeat CUS in 3 months to evaluate for stability of R ICA (currently ~20% stenosis)  - EDUARDO evaluation     Patient Follow-up    - in the next 1-2 week(s) with PCP  - in 6-8 weeks with general neurology ([phone])  - sleep medicine for EDUARDO evaluation (ordered)    Thank you for this consult. No further stroke evaluation is recommended, so we will sign off. Please contact us with any additional questions.    Keerthi Dunn, CNP  Vascular Neurology    To page me or covering stroke neurology team member, click here: AMCOM  Choose \"On Call\" tab at top, then select \"NEUROLOGY/ALL SITES\" from middle drop-down box, press Enter, then look for \"stroke\" or \"telestroke\" for your site.    _____________________________________________________    Clinically Significant Risk Factors Present on Admission                  # Hypertension: Home medication list includes antihypertensive(s)      # Overweight: Estimated body mass index is 27.4 kg/m  as calculated from the following:    Height as of this encounter: 1.798 m (5' 10.8\").    Weight as of this encounter: 88.6 kg (195 lb 5.2 oz).            Past Medical History   Past Medical History:   Diagnosis Date     Herniated lumbar disc without myelopathy      Past Surgical History   No past surgical history on file.  Medications   Home Meds  Prior to Admission medications    Medication Sig Start Date End Date Taking? Authorizing Provider   DULoxetine (CYMBALTA) 60 MG capsule Take 60 mg by mouth daily   Yes Unknown, Entered By History   HYDROcodone-acetaminophen (NORCO)  MG per tablet Take 1 tablet " by mouth 4 times daily   Yes Reported, Patient   hydrOXYzine (ATARAX) 25 MG tablet Take 25 mg by mouth 3 times daily as needed for anxiety   Yes Unknown, Entered By History   methylPREDNISolone (MEDROL DOSEPAK) 4 MG tablet therapy pack Take by mouth See Admin Instructions Follow Package Directions 6/8/23 6/13/23 Yes Unknown, Entered By History   QUEtiapine (SEROQUEL) 50 MG tablet Take  mg by mouth At Bedtime   Yes Unknown, Entered By History   terazosin (HYTRIN) 10 MG capsule Take 10 mg by mouth daily   Yes Reported, Patient   traZODone (DESYREL) 50 MG tablet Take  mg by mouth nightly as needed for sleep   Yes Unknown, Entered By History       Scheduled Meds    aspirin  325 mg Oral Daily     atorvastatin  80 mg Oral QPM     clopidogrel  75 mg Oral or NG Tube Daily     DULoxetine  60 mg Oral Daily     lidocaine  1 patch Transdermal Q24h    And     lidocaine   Transdermal Q8H FERCHO     QUEtiapine   mg Oral At Bedtime     sodium chloride (PF)  3 mL Intracatheter Q8H       Infusion Meds    - MEDICATION INSTRUCTIONS -       - MEDICATION INSTRUCTIONS -       sodium chloride 75 mL/hr at 06/12/23 0916       PRN Meds  HYDROcodone-acetaminophen, hydrOXYzine, lidocaine 4%, lidocaine (buffered or not buffered), - MEDICATION INSTRUCTIONS -, - MEDICATION INSTRUCTIONS -, melatonin, naloxone **OR** naloxone **OR** naloxone **OR** naloxone, ondansetron **OR** ondansetron, sodium chloride (PF)    Allergies   No Known Allergies  Family History   Family History   Problem Relation Age of Onset     Family History Negative Mother      Family History Negative Father      Social History   Social History     Tobacco Use     Smoking status: Every Day     Packs/day: 1.00     Years: 0.00     Pack years: 0.00     Types: Cigarettes   Substance Use Topics     Alcohol use: Yes     Comment: rarely     Drug use: No       Review of Systems   The 10 point Review of Systems is negative other than noted in the HPI or here.        PHYSICAL  EXAMINATION   Temp:  [97.4  F (36.3  C)-98.4  F (36.9  C)] 97.4  F (36.3  C)  Pulse:  [58-86] 64  Resp:  [10-18] 16  BP: (103-163)/() 155/78  SpO2:  [91 %-100 %] 96 %    Neuro Exam  Mental Status:  alert, oriented x 3, follows all simple commands, some difficulty with multistep commands, names 2/3 objects correctly, mild/mod expressive aphasia  Cranial Nerves:  visual fields intact (tested by nurse), EOMI with normal smooth pursuit, facial sensation intact and symmetric (tested by nurse), hearing not formally tested but intact to conversation, no dysarthria, shoulder shrug equal bilaterally, rightward tongue deviation, right facial droop  Motor:  no abnormal movements, able to move all limbs antigravity spontaneously with no signs of hemiparesis observed, no pronator drift  Reflexes:  unable to test (telestroke)  Sensory:  light touch sensation intact and symmetric throughout upper and lower extremities (assessed by nurse), no extinction on double simultaneous stimulation (assessed by nurse)  Coordination:  normal finger-to-nose and heel-to-shin bilaterally without dysmetria  Station/Gait:  unable to test due to telestroke    Dysphagia Screen  Dysarthria or facial droop present - Maintain NPO, consult SLP    Stroke Scales    NIHSS  1a. Level of Consciousness 0-->Alert, keenly responsive   1b. LOC Questions 0-->Answers both questions correctly   1c. LOC Commands 0-->Performs both tasks correctly   2.   Best Gaze 0-->Normal   3.   Visual 0-->No visual loss   4.   Facial Palsy 2-->Partial paralysis (total or near-total paralysis of lower face)   5a. Motor Arm, Left 0-->No drift, limb holds 90 (or 45) degrees for full 10 secs   5b. Motor Arm, Right 0-->No drift, limb holds 90 (or 45) degrees for full 10 secs   6a. Motor Leg, Left 0-->No drift, leg holds 30 degree position for full 5 secs   6b. Motor Leg, right 0-->No drift, leg holds 30 degree position for full 5 secs   7.   Limb Ataxia 0-->Absent   8.   Sensory  0-->Normal, no sensory loss   9.   Best Language 1-->Mild-to-moderate aphasia, some obvious loss of fluency or facility of comprehension, without significant limitation on ideas expressed or form of expression. Reduction of speech and/or comprehension, however, makes conversation. . . (see row details)   10. Dysarthria 0-->Normal   11. Extinction and Inattention  0-->No abnormality   Total 3 (06/12/23 1100)       Modified Nyssa Score (Pre-morbid)  0 - No symptoms.     Imaging  I personally reviewed all imaging; relevant findings per HPI.    Labs Data   CBC  Recent Labs   Lab 06/12/23  0610 06/11/23  1220   WBC 6.8 6.4   RBC 4.25* 4.43   HGB 13.3 14.1   HCT 39.8* 41.3    179     Basic Metabolic Panel   Recent Labs   Lab 06/12/23  0610 06/12/23  0024 06/11/23  2228 06/11/23  1220     --   --  137   POTASSIUM 4.2  --   --  3.7   CHLORIDE 105  --   --  100   CO2 26  --   --  26   BUN 19.3  --   --  16.7   CR 0.80  --   --  0.81   * 130* 116* 104*   DUSTIN 8.5*  --   --  9.0     Liver Panel  No results for input(s): PROTTOTAL, ALBUMIN, BILITOTAL, ALKPHOS, AST, ALT, BILIDIRECT in the last 168 hours.  INR    Recent Labs   Lab Test 06/11/23  1220   INR 0.91      Lipid Profile    Recent Labs   Lab Test 06/11/23  1220   CHOL 168   HDL 44   LDL 92   TRIG 162*     A1C    Recent Labs   Lab Test 06/11/23  1220   A1C 6.0*     Troponin    Recent Labs   Lab 06/11/23 2009 06/11/23  1220   CTROPT 7 6          Stroke Consult Data Data   Telestroke Service Details  (for non-emergent stroke consult with tele)  Video start time 06/12/23   1045   Video end time 06/12/23   1120   Type of service telemedicine diagnostic assessment of acute neurological changes   Reason telemedicine is appropriate patient requires assessment with a specialist for diagnosis and treatment of neurological symptoms   Mode of transmission secure interactive audio and video communication per Luis   Originating site (patient location) [facility]    Distant site (provider location) [facility], [place]     I have personally spent a total of 60 minutes providing care today, time spent in reviewing medical records and reviewing tests, examining the patient and obtaining history, coordination of care, and discussion with the patient and/or family regarding diagnostic results, prognosis, symptom management, risks and benefits of management options, and development of plan of care. Greater than 50% was spent in counseling and coordination of care.

## 2023-06-12 NOTE — CONSULTS
Care Management Discharge Note    Discharge Date: 06/12/2023       Discharge Disposition:  Home    Discharge Services:  OP OT/ST    Discharge DME:  None    Discharge Transportation: car, drives self, family or friend will provide    Private pay costs discussed: Not applicable      Handoff Referral Completed: No    Additional Information:  Sw is aware of consult for discharge planning.  Per chart review and discharge orders, pt will discharge home with OP OT/ST services.  There are no Sw needs identified at this time.      NUBIA La, [facility]  Inpatient Care Coordination  [facility]  [phone]

## 2023-06-12 NOTE — PROGRESS NOTES
CLINICAL SWALLOW EVAL AND LANGUAGE ASSESSMENT       06/12/23 6538   Appointment Info   Signing Clinician's Name / Credentials (SLP) Thelma Brennan M.A., CCC-SLP, BCS-S   General Information   Onset of Illness/Injury or Date of Surgery 06/11/23   Referring Physician Dr. Arndt   Patient/Family Therapy Goal Statement (SLP) Patient would like to return home.   Pertinent History of Current Problem Patient admitted with right sided facial droop and aphasia, MRI indicating left MCA CVA.  Patient's PMH is significant for anxiety, colon CA s/p colectomy in 2020 and chemotherapy.  He reports he is retired and enjoys doing some outdoor things.  He acknowledges communication is very challenging right now and wants to know how long it will take to get better.   General Observations Patient is alert, fatigued though.  Wife Neeru present.   Type of Evaluation   Type of Evaluation Swallow Evaluation;Speech, Language, Cognition   Oral Motor   Oral Musculature anomalies present   Structural Abnormalities present   Mucosal Quality good   Dentition (Oral Motor)   Dentition (Oral Motor) natural dentition   Facial Symmetry (Oral Motor)   Facial Symmetry (Oral Motor) right side impairment   Comment, Facial Symmetry (Oral Motor) Mild droop   Right Side Facial Asymmetry other (see comments)   Lip Function (Oral Motor)   Lip Range of Motion (Oral Motor) protrusion impairment;retraction impairment   Lip Strength (Oral Motor) right side;mild impairment   Protrusion, Lip Range of Motion right side;minimal impairment   Retraction, Lip Range of Motion minimal impairment;right side   Tongue Function (Oral Motor)   Tongue ROM (Oral Motor) protrusion is impaired;lateralization is impaired   Comment, Tongue Function (Oral Motor) Right deviation is mild   Protrusion, Tongue ROM Impairment (Oral Motor) right side;minimal impairment   Lateralization, Tongue ROM Impairment (Oral Motor) right side;minimal impairment   Cough/Swallow/Gag Reflex (Oral Motor)    Volitional Throat Clear/Cough (Oral Motor) WNL   Volitional Swallow (Oral Motor) WNL   Vocal Quality/Secretion Management (Oral Motor)   Vocal Quality (Oral Motor) WNL   Secretion Management (Oral Motor) WNL   Comment, Vocal Quality/Secretion Management (Oral Motor) No drooling/leakage reported   General Swallowing Observations   Past History of Dysphagia No hx of swallowing issues   Respiratory Support (General Swallowing Observations) none   Current Diet/Method of Nutritional Intake (General Swallowing Observations, NIS) regular diet;thin liquids (level 0)   Swallowing Evaluation Clinical swallow evaluation   Clinical Swallow Evaluation   Feeding Assistance no assistance needed   Additional evaluation(s) completed today No   Clinical Swallow Evaluation Textures Trialed thin liquids;solid foods   Clinical Swallow Eval: Thin Liquid Texture Trial   Mode of Presentation, Thin Liquids cup;straw;self-fed   Volume of Liquid or Food Presented 8 oz   Oral Phase of Swallow WFL   Pharyngeal Phase of Swallow intact   Diagnostic Statement No overt Sx of aspiration   Clinical Swallow Evaluation: Solid Food Texture Trial   Mode of Presentation self-fed   Volume Presented 2 crackers   Oral Phase effortful AP movement   Oral Residue right anterior lateral sulci   Pharyngeal Phase intact   Diagnostic Statement Minimal right sided residue after chewing, slow bolus formation.  No overt Sx of aspiration.   Esophageal Phase of Swallow   Patient reports or presents with symptoms of esophageal dysphagia No   Swallowing Recommendations   Diet Consistency Recommendations thin liquids (level 0);regular diet   Supervision Level for Intake patient independent   Mode of Delivery Recommendations bolus size, small;place food on left side of mouth   Swallowing Maneuver Recommendations alternate food and liquid intake   Monitoring/Assistance Required (Eating/Swallowing) check mouth frequently for oral residue/pocketing;stop eating activities  when fatigue is present   Recommended Feeding/Eating Techniques (Swallow Eval) patient is independent, no specific recommendations;maintain upright sitting position for eating;maintain upright posture during/after eating for 30 minutes   Medication Administration Recommendations, Swallowing (SLP) Whole pills with thin liquids ok today   Auditory Comprehension   Follows Commands (Auditory Comprehension) 2-step commands   Object Identification (Auditory Comprehension) WNL   Yes/No Questions (Auditory Comprehension) WNL   2 Step, Follows Commands (Auditory Comprehension) intact   Verbal Expression   Confrontational Naming (Verbal Expression) objects;pictures;body parts   Conversational Speech (Verbal Expression) sentence level   Automatic Speech (Verbal Expression) WNL   Narrative Speech (Verbal Expression) picture description   Objects, Confrontational Naming (Verbal Expression) impaired   Body Parts, Confrontational Naming (Verbal Expression) impaired   Pictures, Confrontational Naming (Verbal Expression) impaired   Picture Description, Narrative Speech (Verbal Expression) moderate impairment   Sentence Level, Conversational Speech (Verbal Expression) moderate impairment   General Therapy Interventions   Planned Therapy Interventions Dysphagia Treatment;Communication   Clinical Impression   Criteria for Skilled Therapeutic Interventions Met (SLP Eval) Yes, treatment indicated   SLP Diagnosis Mild oral dysphagia and moderate expressive aphasia   Risks & Benefits of therapy have been explained evaluation/treatment results reviewed;patient;spouse/significant other   SLP Total Evaluation Time   Eval: oral/pharyngeal swallow function, clinical swallow Minutes (58235) 15   Eval: Sound production with lang comprehension and expression Minutes (61828) 15   SLP Goals   Therapy Frequency (SLP Eval) daily   SLP Predicted Duration/Target Date for Goal Attainment 06/19/23   SLP Goals Swallow;Communication;SLP Goal 1   SLP: Safely  tolerate diet without signs/symptoms of aspiration Regular diet;Thin liquids;Independently;With use of compensatory swallow strategies   SLP: Communicate basic wants and needs independent;using gestures;verbally   SLP: Goal 1 Patient will generate definition/description sentence for functional-related word independently for 9/10 opportunities.   Interventions   Interventions Quick Adds Swallowing Dysfunction;Speech, Language, Voice & Communication   Swallowing Dysfunction &/or Oral Function for Feeding   Treatment of Swallowing Dysfunction &/or Oral Function for Feeding Minutes (02846) 10   Symptoms Noted During/After Treatment Fatigue   Treatment Detail/Skilled Intervention Patient trained re: impact of oral-facial weakness on swallowing safety and trained for use of 3/3 safe swallowing strategies - patient and wife verbalizing comprehension of training today.   SLP Discharge Planning   SLP Plan Swallow f/ x1 and language treatment moderate level   SLP Discharge Recommendation home with outpatient speech therapy  (Ordered)   SLP Rationale for DC Rec Recommend continue SLP services at outpatient setting with intermittent assistance at home needed due to moderate aphasia deficit impact to safety if he were home alone.   SLP Brief overview of current status  Clinical swallow and language assessments completed:  Patient with facial droop and tongue deviation impacting both swallowing safety and speech production.  Moderate expressive aphasia as well, though receptively functioning well.  Recommend regular diet with thin liquids when patient is alert and upright, food placement and chewing into Left sided mouth.   Total Session Time   Total Session Time (sum of timed and untimed services) 40

## 2023-06-13 ENCOUNTER — APPOINTMENT (OUTPATIENT)
Dept: CT IMAGING | Facility: CLINIC | Age: 58
End: 2023-06-13
Attending: EMERGENCY MEDICINE
Payer: COMMERCIAL

## 2023-06-13 ENCOUNTER — APPOINTMENT (OUTPATIENT)
Dept: ULTRASOUND IMAGING | Facility: CLINIC | Age: 58
End: 2023-06-13
Attending: EMERGENCY MEDICINE
Payer: COMMERCIAL

## 2023-06-13 ENCOUNTER — PATIENT OUTREACH (OUTPATIENT)
Dept: CARE COORDINATION | Facility: CLINIC | Age: 58
End: 2023-06-13

## 2023-06-13 ENCOUNTER — HOSPITAL ENCOUNTER (INPATIENT)
Facility: CLINIC | Age: 58
LOS: 3 days | Discharge: HOME | End: 2023-06-16
Attending: EMERGENCY MEDICINE | Admitting: INTERNAL MEDICINE
Payer: COMMERCIAL

## 2023-06-13 ENCOUNTER — APPOINTMENT (OUTPATIENT)
Dept: MRI IMAGING | Facility: CLINIC | Age: 58
End: 2023-06-13
Attending: NURSE PRACTITIONER
Payer: COMMERCIAL

## 2023-06-13 DIAGNOSIS — R09.02 HYPOXIA: ICD-10-CM

## 2023-06-13 DIAGNOSIS — J18.9 PNEUMONIA DUE TO INFECTIOUS ORGANISM, UNSPECIFIED LATERALITY, UNSPECIFIED PART OF LUNG: ICD-10-CM

## 2023-06-13 DIAGNOSIS — I95.9 HYPOTENSION, UNSPECIFIED HYPOTENSION TYPE: ICD-10-CM

## 2023-06-13 DIAGNOSIS — I63.9 CEREBROVASCULAR ACCIDENT (CVA), UNSPECIFIED MECHANISM (H): ICD-10-CM

## 2023-06-13 DIAGNOSIS — I63.9 STROKE (H): Primary | ICD-10-CM

## 2023-06-13 LAB
ANION GAP BLD CALCULATED.3IONS-SCNC: 20 MMOL/L (ref 3–14)
ANION GAP SERPL CALCULATED.3IONS-SCNC: 10 MMOL/L (ref 7–15)
APTT PPP: 22 SECONDS (ref 22–38)
BASOPHILS # BLD AUTO: 0 10E3/UL (ref 0–0.2)
BASOPHILS NFR BLD AUTO: 1 %
BUN SERPL-MCNC: 13.2 MG/DL (ref 6–20)
BUN SERPL-MCNC: 14 MG/DL (ref 7–30)
CA-I BLD-MCNC: 4.8 MG/DL (ref 4.4–5.2)
CALCIUM SERPL-MCNC: 9.1 MG/DL (ref 8.6–10)
CHLORIDE BLD-SCNC: 101 MMOL/L (ref 94–109)
CHLORIDE SERPL-SCNC: 104 MMOL/L (ref 98–107)
CO2 BLD-SCNC: 24 MMOL/L (ref 20–32)
CREAT BLD-MCNC: 0.7 MG/DL (ref 0.7–1.3)
CREAT SERPL-MCNC: 0.81 MG/DL (ref 0.67–1.17)
D DIMER PPP FEU-MCNC: 0.8 UG/ML FEU (ref 0–0.5)
DEPRECATED HCO3 PLAS-SCNC: 25 MMOL/L (ref 22–29)
EOSINOPHIL # BLD AUTO: 0.1 10E3/UL (ref 0–0.7)
EOSINOPHIL NFR BLD AUTO: 1 %
ERYTHROCYTE [DISTWIDTH] IN BLOOD BY AUTOMATED COUNT: 12.4 % (ref 10–15)
GFR SERPL CREATININE-BSD FRML MDRD: >90 ML/MIN/1.73M2
GLUCOSE BLD-MCNC: 141 MG/DL (ref 70–99)
GLUCOSE BLDC GLUCOMTR-MCNC: 140 MG/DL (ref 70–99)
GLUCOSE SERPL-MCNC: 141 MG/DL (ref 70–99)
HCO3 BLDV-SCNC: 26 MMOL/L (ref 21–28)
HCT VFR BLD AUTO: 42.1 % (ref 40–53)
HCT VFR BLD CALC: 43 % (ref 40–53)
HGB BLD-MCNC: 14.6 G/DL (ref 13.3–17.7)
HGB BLD-MCNC: 14.6 G/DL (ref 13.3–17.7)
HOLD SPECIMEN: NORMAL
IMM GRANULOCYTES # BLD: 0 10E3/UL
IMM GRANULOCYTES NFR BLD: 1 %
INR PPP: 0.96 (ref 0.85–1.15)
LACTATE BLD-SCNC: 0.9 MMOL/L
LYMPHOCYTES # BLD AUTO: 1.9 10E3/UL (ref 0.8–5.3)
LYMPHOCYTES NFR BLD AUTO: 30 %
MCH RBC QN AUTO: 31.9 PG (ref 26.5–33)
MCHC RBC AUTO-ENTMCNC: 34.7 G/DL (ref 31.5–36.5)
MCV RBC AUTO: 92 FL (ref 78–100)
MONOCYTES # BLD AUTO: 0.5 10E3/UL (ref 0–1.3)
MONOCYTES NFR BLD AUTO: 9 %
NEUTROPHILS # BLD AUTO: 3.8 10E3/UL (ref 1.6–8.3)
NEUTROPHILS NFR BLD AUTO: 58 %
NRBC # BLD AUTO: 0 10E3/UL
NRBC BLD AUTO-RTO: 0 /100
PCO2 BLDV: 42 MM HG (ref 40–50)
PH BLDV: 7.41 [PH] (ref 7.32–7.43)
PLATELET # BLD AUTO: 178 10E3/UL (ref 150–450)
PO2 BLDV: 44 MM HG (ref 25–47)
POTASSIUM BLD-SCNC: 3.9 MMOL/L (ref 3.4–5.3)
POTASSIUM SERPL-SCNC: 3.9 MMOL/L (ref 3.4–5.3)
PROCALCITONIN SERPL IA-MCNC: 0.03 NG/ML
RBC # BLD AUTO: 4.58 10E6/UL (ref 4.4–5.9)
SAO2 % BLDV: 79 % (ref 94–100)
SODIUM BLD-SCNC: 139 MMOL/L (ref 133–144)
SODIUM SERPL-SCNC: 139 MMOL/L (ref 136–145)
TROPONIN T SERPL HS-MCNC: <6 NG/L
WBC # BLD AUTO: 6.3 10E3/UL (ref 4–11)

## 2023-06-13 PROCEDURE — 80047 BASIC METABLC PNL IONIZED CA: CPT

## 2023-06-13 PROCEDURE — 85379 FIBRIN DEGRADATION QUANT: CPT | Performed by: INTERNAL MEDICINE

## 2023-06-13 PROCEDURE — 250N000011 HC RX IP 250 OP 636: Performed by: EMERGENCY MEDICINE

## 2023-06-13 PROCEDURE — 82962 GLUCOSE BLOOD TEST: CPT

## 2023-06-13 PROCEDURE — 85730 THROMBOPLASTIN TIME PARTIAL: CPT | Performed by: EMERGENCY MEDICINE

## 2023-06-13 PROCEDURE — 84484 ASSAY OF TROPONIN QUANT: CPT | Performed by: EMERGENCY MEDICINE

## 2023-06-13 PROCEDURE — 80048 BASIC METABOLIC PNL TOTAL CA: CPT | Performed by: EMERGENCY MEDICINE

## 2023-06-13 PROCEDURE — 84145 PROCALCITONIN (PCT): CPT | Performed by: PHYSICIAN ASSISTANT

## 2023-06-13 PROCEDURE — 36415 COLL VENOUS BLD VENIPUNCTURE: CPT | Performed by: EMERGENCY MEDICINE

## 2023-06-13 PROCEDURE — 99285 EMERGENCY DEPT VISIT HI MDM: CPT | Mod: 25

## 2023-06-13 PROCEDURE — 255N000002 HC RX 255 OP 636: Performed by: EMERGENCY MEDICINE

## 2023-06-13 PROCEDURE — 93970 EXTREMITY STUDY: CPT

## 2023-06-13 PROCEDURE — 70450 CT HEAD/BRAIN W/O DYE: CPT

## 2023-06-13 PROCEDURE — 120N000001 HC R&B MED SURG/OB

## 2023-06-13 PROCEDURE — 70496 CT ANGIOGRAPHY HEAD: CPT

## 2023-06-13 PROCEDURE — A9585 GADOBUTROL INJECTION: HCPCS | Performed by: EMERGENCY MEDICINE

## 2023-06-13 PROCEDURE — 70498 CT ANGIOGRAPHY NECK: CPT

## 2023-06-13 PROCEDURE — 85610 PROTHROMBIN TIME: CPT | Performed by: EMERGENCY MEDICINE

## 2023-06-13 PROCEDURE — 258N000003 HC RX IP 258 OP 636: Performed by: PHYSICIAN ASSISTANT

## 2023-06-13 PROCEDURE — 250N000009 HC RX 250: Performed by: PHYSICIAN ASSISTANT

## 2023-06-13 PROCEDURE — 71275 CT ANGIOGRAPHY CHEST: CPT

## 2023-06-13 PROCEDURE — 250N000013 HC RX MED GY IP 250 OP 250 PS 637: Performed by: PHYSICIAN ASSISTANT

## 2023-06-13 PROCEDURE — 96365 THER/PROPH/DIAG IV INF INIT: CPT | Mod: 59

## 2023-06-13 PROCEDURE — 93005 ELECTROCARDIOGRAM TRACING: CPT

## 2023-06-13 PROCEDURE — 85025 COMPLETE CBC W/AUTO DIFF WBC: CPT | Performed by: EMERGENCY MEDICINE

## 2023-06-13 PROCEDURE — 258N000003 HC RX IP 258 OP 636: Performed by: EMERGENCY MEDICINE

## 2023-06-13 PROCEDURE — 96375 TX/PRO/DX INJ NEW DRUG ADDON: CPT

## 2023-06-13 PROCEDURE — 99207 PR NO BILLABLE SERVICE THIS VISIT: CPT | Performed by: NURSE PRACTITIONER

## 2023-06-13 PROCEDURE — 99223 1ST HOSP IP/OBS HIGH 75: CPT | Mod: AI | Performed by: PHYSICIAN ASSISTANT

## 2023-06-13 PROCEDURE — 70553 MRI BRAIN STEM W/O & W/DYE: CPT

## 2023-06-13 PROCEDURE — 83605 ASSAY OF LACTIC ACID: CPT

## 2023-06-13 PROCEDURE — 250N000011 HC RX IP 250 OP 636: Performed by: PHYSICIAN ASSISTANT

## 2023-06-13 RX ORDER — ONDANSETRON 4 MG/1
4 TABLET, ORALLY DISINTEGRATING ORAL EVERY 6 HOURS PRN
Status: DISCONTINUED | OUTPATIENT
Start: 2023-06-13 | End: 2023-06-16 | Stop reason: HOSPADM

## 2023-06-13 RX ORDER — AMOXICILLIN 250 MG
2 CAPSULE ORAL 2 TIMES DAILY
Status: DISCONTINUED | OUTPATIENT
Start: 2023-06-13 | End: 2023-06-16 | Stop reason: HOSPADM

## 2023-06-13 RX ORDER — LORAZEPAM 2 MG/ML
0.5 INJECTION INTRAMUSCULAR
Status: COMPLETED | OUTPATIENT
Start: 2023-06-13 | End: 2023-06-13

## 2023-06-13 RX ORDER — AMPICILLIN AND SULBACTAM 2; 1 G/1; G/1
3 INJECTION, POWDER, FOR SOLUTION INTRAMUSCULAR; INTRAVENOUS ONCE
Status: COMPLETED | OUTPATIENT
Start: 2023-06-13 | End: 2023-06-13

## 2023-06-13 RX ORDER — ENOXAPARIN SODIUM 100 MG/ML
40 INJECTION SUBCUTANEOUS EVERY 24 HOURS
Status: DISCONTINUED | OUTPATIENT
Start: 2023-06-13 | End: 2023-06-16 | Stop reason: HOSPADM

## 2023-06-13 RX ORDER — NICOTINE 21 MG/24HR
1 PATCH, TRANSDERMAL 24 HOURS TRANSDERMAL DAILY
Status: DISCONTINUED | OUTPATIENT
Start: 2023-06-13 | End: 2023-06-16 | Stop reason: HOSPADM

## 2023-06-13 RX ORDER — AMPICILLIN AND SULBACTAM 2; 1 G/1; G/1
3 INJECTION, POWDER, FOR SOLUTION INTRAMUSCULAR; INTRAVENOUS EVERY 6 HOURS
Status: DISCONTINUED | OUTPATIENT
Start: 2023-06-13 | End: 2023-06-16 | Stop reason: HOSPADM

## 2023-06-13 RX ORDER — TERAZOSIN 5 MG/1
10 CAPSULE ORAL DAILY
Status: DISCONTINUED | OUTPATIENT
Start: 2023-06-13 | End: 2023-06-16 | Stop reason: HOSPADM

## 2023-06-13 RX ORDER — ONDANSETRON 2 MG/ML
4 INJECTION INTRAMUSCULAR; INTRAVENOUS EVERY 6 HOURS PRN
Status: DISCONTINUED | OUTPATIENT
Start: 2023-06-13 | End: 2023-06-16 | Stop reason: HOSPADM

## 2023-06-13 RX ORDER — IOPAMIDOL 755 MG/ML
500 INJECTION, SOLUTION INTRAVASCULAR ONCE
Status: COMPLETED | OUTPATIENT
Start: 2023-06-13 | End: 2023-06-13

## 2023-06-13 RX ORDER — AMOXICILLIN 250 MG
1 CAPSULE ORAL 2 TIMES DAILY
Status: DISCONTINUED | OUTPATIENT
Start: 2023-06-13 | End: 2023-06-16 | Stop reason: HOSPADM

## 2023-06-13 RX ORDER — PROCHLORPERAZINE 25 MG
25 SUPPOSITORY, RECTAL RECTAL EVERY 12 HOURS PRN
Status: DISCONTINUED | OUTPATIENT
Start: 2023-06-13 | End: 2023-06-16 | Stop reason: HOSPADM

## 2023-06-13 RX ORDER — PROCHLORPERAZINE MALEATE 10 MG
10 TABLET ORAL EVERY 6 HOURS PRN
Status: DISCONTINUED | OUTPATIENT
Start: 2023-06-13 | End: 2023-06-16 | Stop reason: HOSPADM

## 2023-06-13 RX ORDER — LORAZEPAM 2 MG/ML
0.5 INJECTION INTRAMUSCULAR ONCE
Status: COMPLETED | OUTPATIENT
Start: 2023-06-13 | End: 2023-06-13

## 2023-06-13 RX ORDER — LIDOCAINE 40 MG/G
CREAM TOPICAL
Status: DISCONTINUED | OUTPATIENT
Start: 2023-06-13 | End: 2023-06-13

## 2023-06-13 RX ORDER — ACETAMINOPHEN 650 MG/1
650 SUPPOSITORY RECTAL EVERY 6 HOURS PRN
Status: DISCONTINUED | OUTPATIENT
Start: 2023-06-13 | End: 2023-06-16 | Stop reason: HOSPADM

## 2023-06-13 RX ORDER — IPRATROPIUM BROMIDE AND ALBUTEROL SULFATE 2.5; .5 MG/3ML; MG/3ML
3 SOLUTION RESPIRATORY (INHALATION) EVERY 4 HOURS PRN
Status: DISCONTINUED | OUTPATIENT
Start: 2023-06-13 | End: 2023-06-16 | Stop reason: HOSPADM

## 2023-06-13 RX ORDER — ATORVASTATIN CALCIUM 40 MG/1
80 TABLET, FILM COATED ORAL EVERY EVENING
Status: DISCONTINUED | OUTPATIENT
Start: 2023-06-13 | End: 2023-06-16 | Stop reason: HOSPADM

## 2023-06-13 RX ORDER — DULOXETIN HYDROCHLORIDE 60 MG/1
60 CAPSULE, DELAYED RELEASE ORAL DAILY
Status: DISCONTINUED | OUTPATIENT
Start: 2023-06-13 | End: 2023-06-16 | Stop reason: HOSPADM

## 2023-06-13 RX ORDER — ASPIRIN 325 MG
325 TABLET, DELAYED RELEASE (ENTERIC COATED) ORAL DAILY
Status: DISCONTINUED | OUTPATIENT
Start: 2023-06-14 | End: 2023-06-16 | Stop reason: HOSPADM

## 2023-06-13 RX ORDER — IBUPROFEN 800 MG/1
800 TABLET, FILM COATED ORAL EVERY 8 HOURS PRN
COMMUNITY

## 2023-06-13 RX ORDER — CLOPIDOGREL BISULFATE 75 MG/1
75 TABLET ORAL DAILY
Status: DISCONTINUED | OUTPATIENT
Start: 2023-06-14 | End: 2023-06-16 | Stop reason: HOSPADM

## 2023-06-13 RX ORDER — SODIUM CHLORIDE 9 MG/ML
INJECTION, SOLUTION INTRAVENOUS CONTINUOUS
Status: ACTIVE | OUTPATIENT
Start: 2023-06-13 | End: 2023-06-14

## 2023-06-13 RX ORDER — NICOTINE 21-14-7MG
1 KIT TRANSDERMAL DAILY
Status: DISCONTINUED | OUTPATIENT
Start: 2023-06-13 | End: 2023-06-13

## 2023-06-13 RX ORDER — ACETAMINOPHEN 325 MG/1
650 TABLET ORAL EVERY 6 HOURS PRN
Status: DISCONTINUED | OUTPATIENT
Start: 2023-06-13 | End: 2023-06-16 | Stop reason: HOSPADM

## 2023-06-13 RX ORDER — GADOBUTROL 604.72 MG/ML
9 INJECTION INTRAVENOUS ONCE
Status: COMPLETED | OUTPATIENT
Start: 2023-06-13 | End: 2023-06-13

## 2023-06-13 RX ORDER — QUETIAPINE FUMARATE 50 MG/1
50-100 TABLET, FILM COATED ORAL AT BEDTIME
Status: DISCONTINUED | OUTPATIENT
Start: 2023-06-13 | End: 2023-06-16 | Stop reason: HOSPADM

## 2023-06-13 RX ORDER — IPRATROPIUM BROMIDE AND ALBUTEROL SULFATE 2.5; .5 MG/3ML; MG/3ML
3 SOLUTION RESPIRATORY (INHALATION) 4 TIMES DAILY
Status: DISCONTINUED | OUTPATIENT
Start: 2023-06-13 | End: 2023-06-13

## 2023-06-13 RX ADMIN — SODIUM CHLORIDE 80 ML: 9 INJECTION, SOLUTION INTRAVENOUS at 10:42

## 2023-06-13 RX ADMIN — IOPAMIDOL 75 ML: 755 INJECTION, SOLUTION INTRAVENOUS at 10:42

## 2023-06-13 RX ADMIN — SODIUM CHLORIDE: 9 INJECTION, SOLUTION INTRAVENOUS at 16:37

## 2023-06-13 RX ADMIN — ENOXAPARIN SODIUM 40 MG: 40 INJECTION SUBCUTANEOUS at 19:43

## 2023-06-13 RX ADMIN — LORAZEPAM 0.5 MG: 2 INJECTION INTRAMUSCULAR; INTRAVENOUS at 14:59

## 2023-06-13 RX ADMIN — AMPICILLIN SODIUM, SULBACTAM SODIUM 3 G: 2; 1 INJECTION, POWDER, FOR SOLUTION INTRAMUSCULAR; INTRAVENOUS at 19:43

## 2023-06-13 RX ADMIN — TERAZOSIN HYDROCHLORIDE 10 MG: 5 CAPSULE ORAL at 16:27

## 2023-06-13 RX ADMIN — IPRATROPIUM BROMIDE AND ALBUTEROL SULFATE 3 ML: .5; 3 SOLUTION RESPIRATORY (INHALATION) at 16:40

## 2023-06-13 RX ADMIN — SENNOSIDES AND DOCUSATE SODIUM 1 TABLET: 50; 8.6 TABLET ORAL at 19:43

## 2023-06-13 RX ADMIN — GADOBUTROL 9 ML: 604.72 INJECTION INTRAVENOUS at 15:15

## 2023-06-13 RX ADMIN — NICOTINE 1 PATCH: 21 PATCH, EXTENDED RELEASE TRANSDERMAL at 16:29

## 2023-06-13 RX ADMIN — SODIUM CHLORIDE: 9 INJECTION, SOLUTION INTRAVENOUS at 22:06

## 2023-06-13 RX ADMIN — DULOXETINE HYDROCHLORIDE 60 MG: 60 CAPSULE, DELAYED RELEASE ORAL at 16:25

## 2023-06-13 RX ADMIN — SODIUM CHLORIDE 500 ML: 9 INJECTION, SOLUTION INTRAVENOUS at 17:46

## 2023-06-13 RX ADMIN — AMPICILLIN SODIUM AND SULBACTAM SODIUM 3 G: 2; 1 INJECTION, POWDER, FOR SOLUTION INTRAMUSCULAR; INTRAVENOUS at 12:05

## 2023-06-13 RX ADMIN — ATORVASTATIN CALCIUM 80 MG: 40 TABLET, FILM COATED ORAL at 19:43

## 2023-06-13 RX ADMIN — LORAZEPAM 0.5 MG: 2 INJECTION INTRAMUSCULAR; INTRAVENOUS at 12:45

## 2023-06-13 ASSESSMENT — ACTIVITIES OF DAILY LIVING (ADL)
ADLS_ACUITY_SCORE: 40
ADLS_ACUITY_SCORE: 37
ADLS_ACUITY_SCORE: 40
ADLS_ACUITY_SCORE: 37
ADLS_ACUITY_SCORE: 31
ADLS_ACUITY_SCORE: 37
ADLS_ACUITY_SCORE: 31

## 2023-06-13 NOTE — CONSULTS
[facility]    Stroke Telephone Note    I was called by Dr. Burt on 06/13/23 regarding patient Dinesh Farfan. The patient is a 57 year old male who was admitted 6/11-6/12 for L MCA stroke in the setting of chronic appearing L CCA/ICA occlusion. He returned to the ED 6/13/23 for evaluation of worsening left facial weakness, worsening aphasia, RUE weakness that started at 0900. EMS noted that he was hypoxic into the low 80s and hypotensive with SBPs in the mid-80s. He denied shortness of breath or cough. Blood pressure responded to colloids.       Stroke Code Data (for stroke code without tele)  Stroke code activated 06/13/23   1021   Stroke provider first response  06/13/23   1024            Last known normal 06/13/23   0900        Time of discovery   (or onset of symptoms) 06/13/23   0900   Head CT read by Stroke Neuro Dr/Provider 06/13/23   1055   Was stroke code de-escalated? Yes 06/13/23 1108          Imaging Findings   No acute pathology or LVO; final radiology read pending     Intravenous Thrombolysis  Not given due to:   - head trauma/stroke within the past 3 months    Endovascular Treatment  Not initiated due to absence of proximal vessel occlusion    Impression  #Worsening left facial weakness, aphasia, and RUE weakness: recrudescence in the setting of hypoxia and hypotension vs new stroke    Recommendations   - MRI brain w/ and w/out contrast (ordered)  - admit for work up for causes of hypoxia/hypotension (CT chest in process)  - continue aspirin 325 mg, plavix 75 mg, and atorvastatin 80 mg   - please place IP stroke consult     My recommendations are based on the information provided over the phone by Dinesh Farfan's in-person providers. They are not intended to replace the clinical judgment of his in-person providers. I was not requested to personally see or examine the patient at this time.    Keerthi Dunn, CNP  Vascular Neurology    To page me or covering stroke  "neurology team member, click here: AMCOM  Choose \"On Call\" tab at top, then select \"NEUROLOGY/ALL SITES\" from middle drop-down box, press Enter, then look for \"stroke\" or \"telestroke\" for your site.           "

## 2023-06-13 NOTE — PROGRESS NOTES
[facility]    Background: Transitional Care Management program identified per system criteria and reviewed by [facility] team for possible outreach.    Assessment: Upon chart review, [facility] Team member will not proceed with patient outreach related to this episode of Transitional Care Management program due to reason below:    Patient has presented to Emergency Department, been readmitted to hospital, or transferred to another hospital.    Plan: Transitional Care Management episode addressed appropriately per reason noted above.      Vanesa Valadez MA  [facility]    *Connected Care Resource Team does NOT follow patient ongoing. Referrals are identified based on internal discharge reports and the outreach is to ensure patient has an understanding of their discharge instructions.

## 2023-06-13 NOTE — PLAN OF CARE
Occupational Therapy Discharge Summary    Reason for therapy discharge:    Discharged to home with outpatient therapy.    Progress towards therapy goal(s). See goals on Care Plan in [facility] electronic health record for goal details.  Goals partially met.  Barriers to achieving goals:   discharge from facility.    Therapy recommendation(s):    Continued therapy is recommended.  Rationale/Recommendations:  OP OT to progress saftey and independence with I/ADLs..

## 2023-06-13 NOTE — PROGRESS NOTES
[facility]    ED Boarding Nurse Handoff Addendum Report:    Date/time: 2023, 5:50 PM    Activity Level: assist of 1    Fall Risk: Yes:  bed/chair alarm on, nonskid shoes/slippers when out of bed, arm band in place, patient and family education, assistive device/personal items within reach and activity supervised    Active Infusions: NS - Bolus 500 mL/h.     Current Meds Due: UNASYN    Current care needs: Monitor for worsening of stroke symptoms. IVF maintenance. NPO except for meds. Speech evaluation tomorrow. Neuro checks    Oxygen requirements (liters/min and/or FiO2): NA    Respiratory status: Room air    Vital signs (within last 30 minutes):    Vitals:    23 1209 23 1300 23 1550 23 1603   BP: (!) 153/83 (!) 149/92 (!) 167/100    BP Location:  Left arm Left arm    Patient Position:  Semi-Bone's     Cuff Size:  Adult Regular Adult Regular    Pulse: 65 68 76    Resp: 11 14 16 16   TempSrc:       SpO2: 94% 96% 96% 97%   Weight:           Focused assessment within last 30 minutes:    Pt A&O to self only. Was able to say his name but not his . Disoriented x 3. Having word finding difficulty. Has mild R sided facial droop. On room air. VSS but BP slightly elevated. Assist x 1 with gait belt. LS coarse. BS+. Orthostatic BP completed. On ASA, Plavix and Lovenox. Abx UNASYN for Aspiration PNA.  Neuro checks done. No change in neuros except for mild right sided facial droop and right sided weakness that were present upon admission. Has nicotine patch on L shoulder. On K+ protocol.    Consults: Speech/ Neurology/PT/OT/SW    ED Boarding Nurse name: Maya Ramirez RN     intact

## 2023-06-13 NOTE — PHARMACY-ADMISSION MEDICATION HISTORY
Pharmacist Admission Medication History    Admission medication history is complete. The information provided in this note is only as accurate as the sources available at the time of the update.    Medication reconciliation/reorder completed by provider prior to medication history? Yes    Information Source(s): Patient, Family member via in-person    Pertinent Information: just discharged from here last night.    Changes made to PTA medication list:    Added: Ibuprofen    Deleted: None    Changed: None    Medication Affordability:  Not including over the counter (OTC) medications, was there a time in the past 3 months when you did not take your medications as prescribed because of cost?: No    Allergies reviewed with patient and updates made in EHR: Yes, added Morphine, stated severe reaction but unable to recall reaction. Has been taking Hydrocodone without problem.     Medication History Completed By: Juvencio Argueta RPH 6/13/2023 3:05 PM    Prior to Admission medications    Medication Sig Last Dose Taking? Auth Provider Long Term End Date   aspirin (ASA) 325 MG EC tablet Take 1 tablet (325 mg) by mouth daily 6/13/2023 at AM Yes Everton Arndt MD     clopidogrel (PLAVIX) 75 MG tablet 1 tablet (75 mg) by Oral or NG Tube route daily 6/13/2023 at AM Yes Everton Arndt MD Yes    DULoxetine (CYMBALTA) 60 MG capsule Take 60 mg by mouth daily 6/12/2023 at AM Yes Unknown, Entered By History Yes    HYDROcodone-acetaminophen (NORCO)  MG per tablet Take 1 tablet by mouth 4 times daily 6/12/2023 at PM Yes Reported, Patient     hydrOXYzine (ATARAX) 25 MG tablet Take 25 mg by mouth 3 times daily as needed for anxiety 6/12/2023 at AM Yes Unknown, Entered By History     ibuprofen (ADVIL/MOTRIN) 800 MG tablet Take 800 mg by mouth every 8 hours as needed for moderate pain Unknown Yes Unknown, Entered By History     nicotine 21-14-7 MG/24HR KIT Place 1 patch onto the skin daily 6/10/2023 Yes Everton Arndt MD     QUEtiapine  (SEROQUEL) 50 MG tablet Take  mg by mouth At Bedtime 6/10/2023 Yes Unknown, Entered By History Yes    terazosin (HYTRIN) 10 MG capsule Take 10 mg by mouth daily 6/10/2023 Yes Reported, Patient Yes    traZODone (DESYREL) 50 MG tablet Take  mg by mouth nightly as needed for sleep Unknown Yes Unknown, Entered By History Yes    atorvastatin (LIPITOR) 80 MG tablet Take 1 tablet (80 mg) by mouth every evening 6/11/2023 at   Everton Arndt MD Yes

## 2023-06-13 NOTE — ED NOTES
[facility]  ED Nurse Handoff Report    ED Chief complaint: Extremity Weakness  . ED Diagnosis:   Final diagnoses:   None       Allergies: No Known Allergies    Code Status: Full Code    Activity level - Baseline/Home:  independent.  Activity Level - Current:   assist of 1.   Lift room needed: No.   Bariatric: No   Needed: No   Isolation: Yes.   Infection: Not Applicable.     Respiratory status: Room air    Vital Signs (within 30 minutes):   Vitals:    06/13/23 1124 06/13/23 1139 06/13/23 1154 06/13/23 1209   BP: 138/78 (!) 141/82 128/83 (!) 153/83   Pulse: 69 68 75 65   Resp: 10 10 13 11   TempSrc:       SpO2: 94% 96% 95% 94%   Weight:           Cardiac Rhythm:  ,      Pain level:    Patient confused: No.   Patient Falls Risk: nonskid shoes/slippers when out of bed, patient and family education, assistive device/personal items within reach, activity supervised and room door open.   Elimination Status: Has voided     Patient Report - Initial Complaint: Right sided weakness.   Focused Assessment    57 -year-old male just discharged yesterday from [facility] after an acute ischemic stroke.  He was discharged on aspirin and Plavix.  He had a left internal carotid artery occlusion.  He had some degree of57 aphasia as well as right-sided weakness on discharge.  About 1 hour prior to arrival, 9 AM, wife noticed worsening of stroke symptoms including the left facial drop, worsening aphasia, right upper extremity weakness compared to his discharge level of function.  He woke up this morning and seemed to be in the same condition as he was upon discharge.  No falls or trauma.  No new fever.     EMS did note that he was hypoxic into the low 80s as well as hypotensive in the mid 80s systolically.  Wife does not note history of blood clots nor cough or complaint of shortness of breath.  Abnormal Results:   Labs Ordered and Resulted from Time of ED Arrival to Time of ED Departure   BASIC  METABOLIC PANEL - Abnormal       Result Value    Sodium 139      Potassium 3.9      Chloride 104      Carbon Dioxide (CO2) 25      Anion Gap 10      Urea Nitrogen 13.2      Creatinine 0.81      Calcium 9.1      Glucose 141 (*)     GFR Estimate >90     GLUCOSE BY METER - Abnormal    GLUCOSE BY METER POCT 140 (*)    ISTAT BASIC CHEM ICA HEMATOCRIT POCT - Abnormal    Chloride POCT 101      Potassium POCT 3.9      Sodium POCT 139      UREA NITROGEN POCT 14      Calcium, Ionized Whole Blood POCT 4.8      Glucose Whole Blood POCT 141 (*)     Anion Gap POCT 20.0 (*)     Hemoglobin POCT 14.6      Hematocrit POCT 43      Creatinine POCT 0.7      TOTAL CO2 POCT 24     ISTAT GASES LACTATE VENOUS POCT - Abnormal    Lactic Acid POCT 0.9      Bicarbonate Venous POCT 26      O2 Sat, Venous POCT 79 (*)     pCO2 Venous POCT 42      pH Venous POCT 7.41      pO2 Venous POCT 44     INR - Normal    INR 0.96     PARTIAL THROMBOPLASTIN TIME - Normal    aPTT 22     TROPONIN T, HIGH SENSITIVITY - Normal    Troponin T, High Sensitivity <6     CBC WITH PLATELETS AND DIFFERENTIAL    WBC Count 6.3      RBC Count 4.58      Hemoglobin 14.6      Hematocrit 42.1      MCV 92      MCH 31.9      MCHC 34.7      RDW 12.4      Platelet Count 178      % Neutrophils 58      % Lymphocytes 30      % Monocytes 9      % Eosinophils 1      % Basophils 1      % Immature Granulocytes 1      NRBCs per 100 WBC 0      Absolute Neutrophils 3.8      Absolute Lymphocytes 1.9      Absolute Monocytes 0.5      Absolute Eosinophils 0.1      Absolute Basophils 0.0      Absolute Immature Granulocytes 0.0      Absolute NRBCs 0.0     GLUCOSE MONITOR NURSING POCT        CT Chest Pulmonary Embolism w Contrast   Final Result   IMPRESSION:    1. Apparent small filling defect within one of the right lower lobe   segmental pulmonary arteries, likely artifactual related to   motion/respiratory artifact versus less likely small pulmonary   embolus. No evidence for right heart strain.    2. Basilar predominant right more than left, areas of bronchiolar   mucoid impaction, with associated pulmonary opacities, likely   infectious.      SILVINA BATISTA MD            SYSTEM ID:  [id number]      CTA Head Neck with Contrast   Final Result   IMPRESSION:   HEAD CTA:   1.  Decreased filling of anterior division M2 middle cerebral artery   branches on the left versus 6/11/2023 consistent with vessel   occlusion. This generally corresponds to the area of infarct   identified on CT and MRI.   2.  The more proximal M1 segment of the left middle cerebral artery   and posterior division M2 branches appear patent.   3.  The proximal intracranial segment of the left internal carotid   artery remains occluded until the level of the clinoid process,   possibly slightly progressed.      NECK CTA:   1.  Unchanged occlusion of the left common and internal carotid   arteries. Filling of the external branches is presumably via   collaterals.   2.  Mild right carotid artery atherosclerosis without significant   narrowing.   3.  Moderate to severe narrowing at the origin of the right vertebral   artery unchanged. No dissection.      Findings were discussed with Dr. IRENE SANTOS via telephone   at 1102 hours on 6/13/2023.      PAULA RENE MD            SYSTEM ID:  [id number]      CT Head w/o Contrast   Final Result   IMPRESSION:   1.  Probably subtle progression of subacute ischemic injury in the   left middle cerebral artery territory affecting the upper left frontal   white matter.   2.  Subacute infarct affecting the anterior left insula unchanged.   3.  No hemorrhage.       PAULA RENE MD            SYSTEM ID:  [id number]      MR Brain w/o & w Contrast    (Results Pending)       Treatments provided: Imaging, labs, IVF  Family Comments: family at bedside  OBS brochure/video discussed/provided to patient:  N/A  ED Medications:   Medications   ampicillin-sulbactam (UNASYN) 3 g vial to attach to NS  100 mL bag (3 g Intravenous $New Bag 6/13/23 1205)   0.9% sodium chloride BOLUS (0 mLs Intravenous Stopped 6/13/23 1106)   iopamidol (ISOVUE-370) solution 500 mL (75 mLs Intravenous $Given 6/13/23 1042)       Drips infusing:  No  For the majority of the shift this patient was Green.   Interventions performed were NA.    Sepsis treatment initiated: No    Cares/treatment/interventions/medications to be completed following ED care: NA    ED Nurse Name: Avis Zaman RN  12:17 PM  RECEIVING UNIT ED HANDOFF REVIEW    Above ED Nurse Handoff Report was reviewed: Yes  Reviewed by: DEON RAMIREZ RN on June 13, 2023 at 5:49 PM

## 2023-06-13 NOTE — H&P
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  CT of the head showed evidence of early infarct in the left MCA distribution involving the left insula without hemorrhage.  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 MRI brain confirmed acute/subacute left MCA territory ischemic injury without hemorrhagic conversion. Seen by Stroke Neurology and did not recommend any surgical intervention due to chronic L CCA/ICA occlusion. Recommended dual anti-plt therapy for 90 days and started statin for further risk stratification.   He comes back to ED today due to acute episode of recurrent left facial droop, worsening aphasia and RUE weakness around 9:00am. Also was reported to be hypoxic in the 80's and hypotensive with SBP in the 60-70's when evaluated by the EMS.     Since arrival to the ED, his BP improved with 1L NS, to SBP in the 150's. He was able to weaned off O2. CT chest shows no definitive PE but a small filling defect in the right lower lobe segmental pulm arteries. There's also right sided prominence with area of bronchiolar mucoid impaction suggestive of infection. Pt does have normal WBC, normal pro calcitonin and lactic acid though he did continue to sound ronchus with mucous and had obvious dysarthria concerning for possible aspiration. Pt has been started on Unasyn.   Stroke Neuro was called again and recommended repeat MRI of the brain.    He will be admitted to  "the hospital for definitive cares .    #Acute episode of hypoxia - now resolved, Suspect less likely PE but possible aspiration PNA   Unclear cause of transient hypoxia, but has been weaned off O2 and now 96% on RA. He denies CP, SOB, no pleuritic CP. I think PE is somewhat less likely given negative doppler US DVT and not hypoxic but did have recent hospitalization and hx of colon ca.   There is e/o mucoid impaction on RLL on CT and wife describes pt sounding \"gurgly\" in the last few days and now with apparent dysarthria and difficulty producing a cough I'm more inclined to think it might be more aspiration.   Plan:  -  Check D-dimer, if low or negative then confirms no PE  -  Cont Unasyn for possible aspiration PNA   -  NPO for now, request speech eval   -  Cont to monitor, PRN O2 if needed   -  Duonebs QID     #Episode of hypotension - resolved. Do not suspect sepsis given normal LA.   Was hypotension in the 70's with EMS but 100's in ED.   S/p 1L bolus with improved BP in the 140's.   -  Cont to monitor   -  Will keep on NS maintenance fluid given NPO status     #Recurrent right facial droop, dysarthria in the setting of recent dx of Left MCA CVA  Likely recrudescence of sxs due to acute illness rather than new CVA  -  repeat MRI today shows more \"confluence\" of her recent infarct of the anterior left MCA but no new infarct or hemorrhagic transformation.   -  On-going right facial droop and dysarthria  -  Will keep NPO status   -  Speech to see tomorrow   -  IVF hydration  -  Cont  and Plavix   -  On empiric Lovenox dosing for DVT prophylaxis   -  Stroke Neuro consult     # On-going tobacco abuse   -  Smoking cessation counseled   -  Cont nicotine patch    #HLP   -  resume statin    #Anxiety  - Resume Cymbalta, atarax and seroquel for sleep    Awaiting formal pharmacy reconciliation to resume home medications.     DVT Prophylaxis: Enoxaparin (Lovenox) SQ  Code Status: Full Code  COVID PCR TESTING STATUS: " Negative    Family updated with plan of care: wife at bedside     Bambi Ray CARLA Maya    Primary Care Physician   Gian Hoffman    Chief Complaint   Recurrent facial droop     History is obtained from the patient, pt's wife     Discussed with Dr. Burt in the ED, full chart review including lab work, imaging, and vital signs were reviewed.     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  CT of the head showed evidence of early infarct in the left MCA distribution involving the left insula without hemorrhage.  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 MRI brain confirmed acute/subacute left MCA territory ischemic injury without hemorrhagic conversion. Seen by Stroke Neurology and did not recommend any surgical intervention due to chronic L CCA/ICA occlusion. Recommended dual anti-plt therapy for 90 days and started statin for further risk stratification.   He comes back to ED today due to acute episode of recurrent left facial droop, worsening aphasia and RUE weakness around 9:00am. Also was reported to be hypoxic in the 80's and hypotensive with SBP in the 60-70's when evaluated by the EMS.     Since arrival to the ED, his BP improved with 1L NS, to SBP in the 150's. He was able to weaned off O2. CT chest shows no definitive PE but a small filling defect in the right lower lobe segmental pulm arteries. There's also right sided prominence with area of bronchiolar mucoid impaction suggestive of infection. Pt does have normal WBC, normal pro calcitonin and lactic acid though he did continue to sound ronchus with mucous  and had obvious dysarthria concerning for possible aspiration. Pt has been started on Unasyn.   Stroke Neuro was called again and recommended repeat MRI of the brain.    He will be admitted to the hospital for definitive cares .    Past Medical History    I have reviewed this patient's medical history and updated it with pertinent information if needed.   Past Medical History:   Diagnosis Date    Herniated lumbar disc without myelopathy        Past Surgical History   I have reviewed this patient's surgical history and updated it with pertinent information if needed.  No past surgical history on file.    Prior to Admission Medications   Prior to Admission Medications   Prescriptions Last Dose Informant Patient Reported? Taking?   DULoxetine (CYMBALTA) 60 MG capsule  Self Yes No   Sig: Take 60 mg by mouth daily   HYDROcodone-acetaminophen (NORCO)  MG per tablet  Self Yes No   Sig: Take 1 tablet by mouth 4 times daily   QUEtiapine (SEROQUEL) 50 MG tablet  Self Yes No   Sig: Take  mg by mouth At Bedtime   aspirin (ASA) 325 MG EC tablet   No No   Sig: Take 1 tablet (325 mg) by mouth daily   atorvastatin (LIPITOR) 80 MG tablet   No No   Sig: Take 1 tablet (80 mg) by mouth every evening   clopidogrel (PLAVIX) 75 MG tablet   No No   Si tablet (75 mg) by Oral or NG Tube route daily   hydrOXYzine (ATARAX) 25 MG tablet  Self Yes No   Sig: Take 25 mg by mouth 3 times daily as needed for anxiety   nicotine 21-14-7 MG/24HR KIT   No No   Sig: Place 1 patch onto the skin daily   terazosin (HYTRIN) 10 MG capsule  Self Yes No   Sig: Take 10 mg by mouth daily   traZODone (DESYREL) 50 MG tablet  Self Yes No   Sig: Take  mg by mouth nightly as needed for sleep      Facility-Administered Medications: None     Allergies   No Known Allergies    Social History   I have reviewed this patient's social history and updated it with pertinent information if needed. Dinesh Farfan  reports that he has been smoking  cigarettes. He has been smoking an average of 1.00 packs per day. He does not have any smokeless tobacco history on file. He reports current alcohol use. He reports that he does not use drugs.    Family History   I have reviewed this patient's family history and updated it with pertinent information if needed.   Family History   Problem Relation Age of Onset    Family History Negative Mother     Family History Negative Father        Review of Systems   The 10 point Review of Systems is negative other than noted in the HPI or here.     Physical Exam     Temp src: Oral BP: (!) 153/83 Pulse: 65   Resp: 11 SpO2: 94 % O2 Device: Nasal cannula Oxygen Delivery: 4 LPM  Vital Signs with Ranges  Temp:  [97.6  F (36.4  C)] 97.6  F (36.4  C)  Pulse:  [65-86] 65  Resp:  [10-18] 11  BP: (108-158)/(63-83) 153/83  SpO2:  [92 %-97 %] 94 %  194 lbs 10.66 oz    Constitutional: Awake, alert,  groggy, right mouth droop, dysarthria, poor cough reflex.  Eyes: Conjunctiva and pupils examined and normal.  HEENT: Moist mucous membranes, normal dentition.  Respiratory: Dec BS at bases, scattered faint wheeze at the bases  Cardiovascular: Regular rate and rhythm, normal S1 and S2, and no murmur noted.  GI: Soft, non-distended, non-tender, bowel sounds present. No rebound tenderness or guarding.  Lymph/Hematologic: No anterior cervical or supraclavicular adenopathy.  Skin: No rashes, no cyanosis, no edema.  Musculoskeletal: No deformities noted.  No erythema or tenderness. Moving all extremities.  Neurologic: right facial droop, tongue deviates a bit to the left, finger to nose appropriate, no drift, strength 5/5 BL upper and lower extremities. Coordination and strength grossly normal.   Psychiatric: Appropriate affect.    Data   Data reviewed today:      EKG: Sinus   Imaging:   Recent Results (from the past 24 hour(s))   CT Head w/o Contrast    Narrative    CT HEAD WITHOUT CONTRAST 6/13/2023 10:43 AM    INDICATION: Code Stroke to evaluate for  potential thrombolysis and  thrombectomy.  Recent stroke. Worsening right-sided weakness with  aphasia.  TECHNIQUE: CT scan of the head without contrast. Dose reduction  techniques were used.  CONTRAST: None.  COMPARISON: Head and neck CTA and brain MRI 6/11/2023.    FINDINGS: Small recent subacute infarct is visible in the anterior  left insula. Several calcifications within and along the left middle  cerebral artery territory presumably reflecting prior emboli are  unchanged. Patchy hypodensity within the left frontal white matter  (axial image 19) is new versus the prior CT most consistent with minor  interval progression of subacute ischemic injury. No hemorrhage. The  ventricles and sulci are normal for age. Osseous structures are  intact. Unremarkable orbits. Paranasal sinuses are free of significant  disease. Clear mastoid air cells.      Impression    IMPRESSION:  1.  Probably subtle progression of subacute ischemic injury in the  left middle cerebral artery territory affecting the upper left frontal  white matter.  2.  Subacute infarct affecting the anterior left insula unchanged.  3.  No hemorrhage.     PAULA RENE MD         SYSTEM ID:  [id number]   CTA Head Neck with Contrast    Narrative    CTA HEAD AND NECK WITH CONTRAST 6/13/2023 10:50 AM    INDICATION: Code stroke to evaluate for potential thrombolysis and  thrombectomy. Recent stroke. Worsening right-sided weakness with  aphasia.    TECHNIQUE: Head and neck CT angiogram with IV contrast. CT images of  the head and neck vessels obtained during the arterial phase of  intravenous contrast administration. Axial helical 2D reconstructed  images and multiplanar 3D MIP reconstructed images of the head and  neck vessels were performed on a separate workstation. Dose reduction  techniques were used.    CONTRAST: 75mL Isovue-[id number]    COMPARISON: Head and neck CTA 6/11/2023.     FINDINGS:  HEAD CTA: The intracranial segment of the left internal carotid  artery  remains occluded until its supraclinoid level, possibly slightly  progressed. The proximal left middle cerebral artery fills throughout  its M1 segment. There is occlusion or at least diminished filling in  the proximal anterior division M2 branches versus the prior CTA. These  correspond to the area of infarct identified in the anterior insula.  Posterior left middle cerebral artery branches opacify with contrast.  No anterior, right middle or posterior cerebral artery occlusion.  Patent dural venous sinuses.    NECK CTA: Three vessel arch. The left carotid artery is occluded  proximally. Reconstitution of external carotid artery branches  presumably via collateral filling. The common and internal segments  remain occluded. Mild atherosclerosis of the right carotid artery  without narrowing by NASCET criteria. Moderate to severe narrowing at  the origin of the right vertebral artery. Patent left vertebral  artery. No dissection. Overall no significant interval change.      Impression    IMPRESSION:  HEAD CTA:  1.  Decreased filling of anterior division M2 middle cerebral artery  branches on the left versus 6/11/2023 consistent with vessel  occlusion. This generally corresponds to the area of infarct  identified on CT and MRI.  2.  The more proximal M1 segment of the left middle cerebral artery  and posterior division M2 branches appear patent.  3.  The proximal intracranial segment of the left internal carotid  artery remains occluded until the level of the clinoid process,  possibly slightly progressed.    NECK CTA:  1.  Unchanged occlusion of the left common and internal carotid  arteries. Filling of the external branches is presumably via  collaterals.  2.  Mild right carotid artery atherosclerosis without significant  narrowing.  3.  Moderate to severe narrowing at the origin of the right vertebral  artery unchanged. No dissection.    Findings were discussed with Dr. IRENE SANTOS via  telephone  at 1102 hours on 6/13/2023.    PAULA RENE MD         SYSTEM ID:  [id number]   CT Chest Pulmonary Embolism w Contrast    Narrative    CT CHEST PULMONARY EMBOLISM WITH CONTRAST  6/13/2023 10:53 AM    HISTORY: Chest pain, evaluate PE, Dyspnea, hypotensive; Male sex; No  imaging to rule out PE in the last 24 hours; Pulmonary embolism  rule-out criteria (PERC) score > 0; Revised Winona Score (RGS) not >=  11; No D-dimer result available; D-dimer not ordered.    TECHNIQUE: Scans obtained from the apices through the diaphragm with  IV contrast. 75mL Isovue-370 IV injected. Radiation dose for this scan  was reduced using automated exposure control, adjustment of the mA  and/or kV according to patient size, or iterative reconstruction  technique. 2D and 3D MIP reconstructions were performed by the CT  technologist    COMPARISON:  None available.    FINDINGS:  Chest/mediastinum: Small apparent filling defect within a right lower  lobe segmental pulmonary artery (series 6 image 190), likely  artifactual related to motion/respiratory artifact No cardiomegaly or  significant pericardial effusion. No significant mediastinal or hilar  lymphadenopathy. Mild atherosclerotic vascular calcification of the  coronary arteries.    Lungs and pleura: No pleural effusion or pneumothorax. Basilar  predominant, right more than left areas of bronchiolar mucoid  impaction, with associated pulmonary opacities, likely infectious.    Upper abdomen: Limited evaluation of the upper abdomen due to lack of  coverage and timing of contrast.    Bones and soft tissue: No suspicious osseous lesion.      Impression    IMPRESSION:   1. Apparent small filling defect within one of the right lower lobe  segmental pulmonary arteries, likely artifactual related to  motion/respiratory artifact versus less likely small pulmonary  embolus. No evidence for right heart strain.  2. Basilar predominant right more than left, areas of  bronchiolar  mucoid impaction, with associated pulmonary opacities, likely  infectious.    SILVINA BATISTA MD         SYSTEM ID:  [id number]       Recent Labs   Lab 06/13/23  1027 06/13/23  1024 06/12/23  0610 06/11/23  2228 06/11/23  1220   WBC 6.3  --  6.8  --  6.4   HGB 14.6  14.6  --  13.3  --  14.1   MCV 92  --  94  --  93     --  177  --  179   INR 0.96  --   --   --  0.91     139  --  138  --  137   POTASSIUM 3.9  3.9  --  4.2  --  3.7   CHLORIDE 101  104  --  105  --  100   CO2 25  --  26  --  26   BUN 14  13.2  --  19.3  --  16.7   CR 0.81  0.7  --  0.80  --  0.81   ANIONGAP 10  --  7  --  11   DUSTIN 9.1  --  8.5*  --  9.0   *  141* 140* 126*   < > 104*    < > = values in this interval not displayed.           Bambi Maya PA-C  [facility]  June 13, 2023  Securely message with the Vocera Web Console (learn more here)  Text page via [email or web address] Paging/Directory

## 2023-06-13 NOTE — PLAN OF CARE
SLP: Order received, chart reviewed and evaluation attempted. Pt, wife and RN all in ED room though pt about to be transferred to MRI. Unfortunately unable to complete clinical swallow evaluation at this time/before end of shift. Pt should automatically FAIL RN dysphagia screen given the following characterstics: Has facial droop; Speech is not clear. Discussed with pt, wife and RN that pt is to remain NPO until SLP evaluation tomorrow AM and they verbalized understanding and agreement.

## 2023-06-13 NOTE — ED PROVIDER NOTES
[facility] ED Provider Note  [facility] Emergency Department  10:53 AM  6/13/2023    Dinesh DESIRAE Adolph  57 year oldmale    Chief Complaint   Patient presents with     Extremity Weakness       HPI:  57 -year-old male just discharged yesterday from [facility] after an acute ischemic stroke.  He was discharged on aspirin and Plavix.  He had a left internal carotid artery occlusion.  He had some degree of57 aphasia as well as right-sided weakness on discharge.  About 1 hour prior to arrival, 9 AM, wife noticed worsening of stroke symptoms including the left facial drop, worsening aphasia, right upper extremity weakness compared to his discharge level of function.  He woke up this morning and seemed to be in the same condition as he was upon discharge.  No falls or trauma.  No new fever.    EMS did note that he was hypoxic into the low 80s as well as hypotensive in the mid 80s systolically.  Wife does not note history of blood clots nor cough or complaint of shortness of breath.      ROS: 10 point ROS is negative other than mentioned above in HPI    Past Medical History:   Diagnosis Date     Herniated lumbar disc without myelopathy        No past surgical history on file.      Social History     Tobacco Use     Smoking status: Every Day     Packs/day: 1.00     Years: 0.00     Pack years: 0.00     Types: Cigarettes   Substance Use Topics     Alcohol use: Yes     Comment: rarely     Drug use: No         Current Outpatient Medications   Medication Instructions     aspirin (ASA) 325 mg, Oral, DAILY     atorvastatin (LIPITOR) 80 mg, Oral, EVERY EVENING     clopidogrel (PLAVIX) 75 mg, Oral or NG Tube, DAILY     DULoxetine (CYMBALTA) 60 mg, Oral, DAILY     HYDROcodone-acetaminophen (NORCO)  MG per tablet 1 tablet, Oral, 4 TIMES DAILY     hydrOXYzine (ATARAX) 25 mg, Oral, 3 TIMES DAILY PRN     nicotine 21-14-7 MG/24HR KIT 1 patch, Transdermal, DAILY     QUEtiapine (SEROQUEL)  mg, Oral, AT BEDTIME     terazosin  "(HYTRIN) 10 mg, Oral, DAILY     traZODone (DESYREL)  mg, Oral, AT BEDTIME PRN         Allergies   Allergen Reactions     Morphine Unknown     Takes hydrocodone without problem.         Physical Exam  BP (!) 144/66 (BP Location: Right arm)   Pulse 90   Temp 98  F (36.7  C) (Oral)   Resp 16   Ht 1.753 m (5' 9\")   Wt 87.2 kg (192 lb 3.9 oz)   SpO2 92%   BMI 28.39 kg/m      HEENT:  PERRL, measuring 4mm bilaterally, conjunctiva and lids normal, external ears unremarkable, Nares clear bilaterally, mmm, oropharynx without tonsillar hypertrophy/erythema/exudate    Neck: supple,     Lungs: No significant tachypnea, lungs clear    CV: rrr, no m/r/g, ppi    Abd: soft, nontender, nondistended, no rebound/masses/guarding/hsm    Ext: no peripheral edema, no major joint effusion    Skin: warm, dry, no rashes/bruising/lesions on exposed skin    Neuro:   Awake, moderate left facial nasolabial fold flattening/asymmetry, dense aphasia  Right upper extremity weakness on initial exam has 3 out of 5 shoulder shrug 2 out of 5     left upper extremity 5 out of 5  Left lower extremity motor 5 out of 5  Right lower extremity plantarflexion 4 out of 5 compared to the contralateral side  Able to do straight leg raise right lower extremity and hold for 3 seconds but not as brisk or able to hold as long as the left lower extremity  Localizes to pain all extremities    Psych: Normal mood, normal affect      Labs and Imaging:      Labs Ordered and Resulted from Time of ED Arrival to Time of ED Departure   BASIC METABOLIC PANEL - Abnormal       Result Value    Sodium 139      Potassium 3.9      Chloride 104      Carbon Dioxide (CO2) 25      Anion Gap 10      Urea Nitrogen 13.2      Creatinine 0.81      Calcium 9.1      Glucose 141 (*)     GFR Estimate >90     GLUCOSE BY METER - Abnormal    GLUCOSE BY METER POCT 140 (*)    ISTAT BASIC CHEM ICA HEMATOCRIT POCT - Abnormal    Chloride POCT 101      Potassium POCT 3.9      Sodium POCT " 139      UREA NITROGEN POCT 14      Calcium, Ionized Whole Blood POCT 4.8      Glucose Whole Blood POCT 141 (*)     Anion Gap POCT 20.0 (*)     Hemoglobin POCT 14.6      Hematocrit POCT 43      Creatinine POCT 0.7      TOTAL CO2 POCT 24     ISTAT GASES LACTATE VENOUS POCT - Abnormal    Lactic Acid POCT 0.9      Bicarbonate Venous POCT 26      O2 Sat, Venous POCT 79 (*)     pCO2 Venous POCT 42      pH Venous POCT 7.41      pO2 Venous POCT 44     D DIMER QUANTITATIVE - Abnormal    D-Dimer Quantitative 0.80 (*)    INR - Normal    INR 0.96     PARTIAL THROMBOPLASTIN TIME - Normal    aPTT 22     TROPONIN T, HIGH SENSITIVITY - Normal    Troponin T, High Sensitivity <6     PROCALCITONIN - Normal    Procalcitonin 0.03     CBC WITH PLATELETS AND DIFFERENTIAL    WBC Count 6.3      RBC Count 4.58      Hemoglobin 14.6      Hematocrit 42.1      MCV 92      MCH 31.9      MCHC 34.7      RDW 12.4      Platelet Count 178      % Neutrophils 58      % Lymphocytes 30      % Monocytes 9      % Eosinophils 1      % Basophils 1      % Immature Granulocytes 1      NRBCs per 100 WBC 0      Absolute Neutrophils 3.8      Absolute Lymphocytes 1.9      Absolute Monocytes 0.5      Absolute Eosinophils 0.1      Absolute Basophils 0.0      Absolute Immature Granulocytes 0.0      Absolute NRBCs 0.0     RESPIRATORY AEROBIC BACTERIAL CULTURE   GRAM STAIN         MR Brain w/o & w Contrast   Final Result   IMPRESSION:    1.  Evolving areas of recent infarct in the anterior left middle   cerebral artery territory affecting the frontal lobe and insula. The   areas of ischemic tissue are slightly more confluent but overall   unchanged in size.   2.  No area of new infarct.   3.  No hemorrhage.      PAULA RENE MD            SYSTEM ID:  [id number]      US Lower Extremity Venous Duplex Bilateral   Final Result   IMPRESSION:   1.  No deep venous thrombosis in the bilateral lower extremities.      PAMELA BARRAGAN MD            SYSTEM ID:  [id number]       CT Chest Pulmonary Embolism w Contrast   Final Result   IMPRESSION:    1. Apparent small filling defect within one of the right lower lobe   segmental pulmonary arteries, likely artifactual related to   motion/respiratory artifact versus less likely small pulmonary   embolus. No evidence for right heart strain.   2. Basilar predominant right more than left, areas of bronchiolar   mucoid impaction, with associated pulmonary opacities, likely   infectious.      SILVINA BATISTA MD            SYSTEM ID:  [id number]      CTA Head Neck with Contrast   Final Result   IMPRESSION:   HEAD CTA:   1.  Decreased filling of anterior division M2 middle cerebral artery   branches on the left versus 6/11/2023 consistent with vessel   occlusion. This generally corresponds to the area of infarct   identified on CT and MRI.   2.  The more proximal M1 segment of the left middle cerebral artery   and posterior division M2 branches appear patent.   3.  The proximal intracranial segment of the left internal carotid   artery remains occluded until the level of the clinoid process,   possibly slightly progressed.      NECK CTA:   1.  Unchanged occlusion of the left common and internal carotid   arteries. Filling of the external branches is presumably via   collaterals.   2.  Mild right carotid artery atherosclerosis without significant   narrowing.   3.  Moderate to severe narrowing at the origin of the right vertebral   artery unchanged. No dissection.      Findings were discussed with Dr. IRENE SANTOS via telephone   at 1102 hours on 6/13/2023.      PAULA RENE MD            SYSTEM ID:  [id number]      CT Head w/o Contrast   Final Result   IMPRESSION:   1.  Probably subtle progression of subacute ischemic injury in the   left middle cerebral artery territory affecting the upper left frontal   white matter.   2.  Subacute infarct affecting the anterior left insula unchanged.   3.  No hemorrhage.       PAULA RENE  MD            SYSTEM ID:  [id number]            ECG shows normal sinus rhythm without evidence of ischemia or red flags for malignant arrhythmia    Independent Historian: wife who privders collateral regarding timeline and DC condition     Review of External Notes: reveiwed recent hospital DC sumary    Independent Interpretation (X-rays, CTs, rhythm strip):       Consultations/Discussion of Management or Tests:   Stroke neuro - no TNK or IA procedure.  Admitting hospitalist     Social Determinants of Health affecting care:  None            Medications Administered in the ED    Medications   aspirin (ASA) EC tablet 325 mg (has no administration in time range)   atorvastatin (LIPITOR) tablet 80 mg (80 mg Oral $Given 6/13/23 1943)   clopidogrel (PLAVIX) tablet 75 mg (has no administration in time range)   DULoxetine (CYMBALTA) DR capsule 60 mg (60 mg Oral $Given 6/13/23 1625)   QUEtiapine (SEROquel) tablet  mg (has no administration in time range)   terazosin (HYTRIN) capsule 10 mg (10 mg Oral $Given 6/13/23 1627)   traZODone (DESYREL) half-tab  mg (has no administration in time range)   lidocaine 1 % 0.1-1 mL (has no administration in time range)   melatonin tablet 1 mg (has no administration in time range)   enoxaparin ANTICOAGULANT (LOVENOX) injection 40 mg (40 mg Subcutaneous $Given 6/13/23 1943)   sodium chloride 0.9% infusion ( Intravenous Rate/Dose Verify 6/13/23 2000)   acetaminophen (TYLENOL) tablet 650 mg (has no administration in time range)     Or   acetaminophen (TYLENOL) Suppository 650 mg (has no administration in time range)   senna-docusate (SENOKOT-S/PERICOLACE) 8.6-50 MG per tablet 1 tablet (1 tablet Oral $Given 6/13/23 1943)     Or   senna-docusate (SENOKOT-S/PERICOLACE) 8.6-50 MG per tablet 2 tablet ( Oral See Alternative 6/13/23 1943)   ondansetron (ZOFRAN ODT) ODT tab 4 mg (has no administration in time range)     Or   ondansetron (ZOFRAN) injection 4 mg (has no administration in time  range)   prochlorperazine (COMPAZINE) injection 10 mg (has no administration in time range)     Or   prochlorperazine (COMPAZINE) tablet 10 mg (has no administration in time range)     Or   prochlorperazine (COMPAZINE) suppository 25 mg (has no administration in time range)   ampicillin-sulbactam (UNASYN) 3 g vial to attach to  mL bag (3 g Intravenous $New Bag 6/13/23 1943)   ipratropium - albuterol 0.5 mg/2.5 mg/3 mL (DUONEB) neb solution 3 mL (3 mLs Nebulization Not Given 6/13/23 2105)   nicotine Patch in Place ( Transdermal Patch in Place 6/13/23 1631)   nicotine (NICODERM CQ) 21 MG/24HR 24 hr patch 1 patch (1 patch Transdermal $Patch/Med Applied 6/13/23 1629)   0.9% sodium chloride BOLUS (0 mLs Intravenous Stopped 6/13/23 1106)   iopamidol (ISOVUE-370) solution 500 mL (75 mLs Intravenous $Given 6/13/23 1042)   ampicillin-sulbactam (UNASYN) 3 g vial to attach to  mL bag (0 g Intravenous Stopped 6/13/23 1559)   LORazepam (ATIVAN) injection 0.5 mg (0.5 mg Intravenous $Given 6/13/23 1245)   LORazepam (ATIVAN) injection 0.5 mg (0.5 mg Intravenous $Given 6/13/23 1459)   gadobutrol (GADAVIST) injection 9 mL (9 mLs Intravenous $Given 6/13/23 1515)   0.9% sodium chloride BOLUS (500 mLs Intravenous $New Bag 6/13/23 1746)         Medical Decision Making:  Acute on chronic left MCA stroke symptoms in the setting of the patient discharged yesterday with a known occluded left internal carotid artery.  EMS noted hypotension and hypoxia into worsening stroke symptoms may well be due to underlying cardiopulmonary cause will need evaluation for that as well.  Blood sugar was normal.  ECG showing normal sinus rhythm.  He was made a tier 1 code stroke and we will also have him get a CT pulmonary angiogram.    Vital signs improved with supplemental oxygen and crystalloid fluid administration.  Eventually able to be weaned off of supplemental oxygen by the time he returned to the ED from CT.      CT/CTA with expected  evolution of prev CVA, no hemorrhage.  No recurrent hypoTN or hypoxia.  CT chest with small PE vs artifact and possible pna which assume would be aspiration given recent stroke and possible imparied swallow.  PE small enough would cause degree of reported hypoxia.  Favor artifact.  Discussed w stroke neuro: if we felt nigel to treat PE they would be ok with heparin/DOAC.  We felt likely not real and hold stronger anticoagulants.  US BLE negative.  Andrew to hospitalist service.          Diagnosis:    ICD-10-CM    1. Cerebrovascular accident (CVA), unspecified mechanism (H)  I63.9       2. Hypotension, unspecified hypotension type  I95.9       3. Hypoxia  R09.02             Disposition:  Admit       Osmar Burt MD  [facility]                         Osmar Burt MD  06/13/23 4083

## 2023-06-13 NOTE — ED TRIAGE NOTES
Pt was at home, was discharged 6/12 from hospital for a stroke. Pt had increase right sided weakness this morning. Aphasia. Last known well was on hour ago. BG enroute was 212.      Triage Assessment     Row Name 06/13/23 1029       Triage Assessment (Adult)    Airway WDL WDL       Respiratory WDL    Respiratory WDL X;all    Rhythm/Pattern, Respiratory shortness of breath       Cardiac WDL    Cardiac WDL WDL       Peripheral/Neurovascular WDL    Peripheral Neurovascular WDL WDL       [place] Coma Scale    Best Eye Response 4-->(E4) spontaneous    Best Motor Response 6-->(M6) obeys commands    Best Verbal Response 4-->(V4) confused    [place] Coma Scale Score 14

## 2023-06-14 ENCOUNTER — APPOINTMENT (OUTPATIENT)
Dept: SPEECH THERAPY | Facility: CLINIC | Age: 58
End: 2023-06-14
Payer: COMMERCIAL

## 2023-06-14 ENCOUNTER — APPOINTMENT (OUTPATIENT)
Dept: GENERAL RADIOLOGY | Facility: CLINIC | Age: 58
End: 2023-06-14
Attending: HOSPITALIST
Payer: COMMERCIAL

## 2023-06-14 ENCOUNTER — APPOINTMENT (OUTPATIENT)
Dept: OCCUPATIONAL THERAPY | Facility: CLINIC | Age: 58
End: 2023-06-14
Attending: PHYSICIAN ASSISTANT
Payer: COMMERCIAL

## 2023-06-14 LAB
ANION GAP SERPL CALCULATED.3IONS-SCNC: 9 MMOL/L (ref 7–15)
ATRIAL RATE - MUSE: 70 BPM
BUN SERPL-MCNC: 14.3 MG/DL (ref 6–20)
CALCIUM SERPL-MCNC: 8.6 MG/DL (ref 8.6–10)
CHLORIDE SERPL-SCNC: 104 MMOL/L (ref 98–107)
CREAT SERPL-MCNC: 0.72 MG/DL (ref 0.67–1.17)
DEPRECATED HCO3 PLAS-SCNC: 24 MMOL/L (ref 22–29)
DIASTOLIC BLOOD PRESSURE - MUSE: NORMAL MMHG
ERYTHROCYTE [DISTWIDTH] IN BLOOD BY AUTOMATED COUNT: 12.4 % (ref 10–15)
GFR SERPL CREATININE-BSD FRML MDRD: >90 ML/MIN/1.73M2
GLUCOSE SERPL-MCNC: 96 MG/DL (ref 70–99)
HCT VFR BLD AUTO: 38 % (ref 40–53)
HGB BLD-MCNC: 13.2 G/DL (ref 13.3–17.7)
INTERPRETATION ECG - MUSE: NORMAL
MCH RBC QN AUTO: 31.8 PG (ref 26.5–33)
MCHC RBC AUTO-ENTMCNC: 34.7 G/DL (ref 31.5–36.5)
MCV RBC AUTO: 92 FL (ref 78–100)
P AXIS - MUSE: 61 DEGREES
PLATELET # BLD AUTO: 179 10E3/UL (ref 150–450)
POTASSIUM SERPL-SCNC: 4.1 MMOL/L (ref 3.4–5.3)
PR INTERVAL - MUSE: 138 MS
QRS DURATION - MUSE: 106 MS
QT - MUSE: 414 MS
QTC - MUSE: 447 MS
R AXIS - MUSE: 59 DEGREES
RBC # BLD AUTO: 4.15 10E6/UL (ref 4.4–5.9)
SODIUM SERPL-SCNC: 137 MMOL/L (ref 136–145)
SYSTOLIC BLOOD PRESSURE - MUSE: NORMAL MMHG
T AXIS - MUSE: 65 DEGREES
VENTRICULAR RATE- MUSE: 70 BPM
WBC # BLD AUTO: 6.4 10E3/UL (ref 4–11)

## 2023-06-14 PROCEDURE — 92611 MOTION FLUOROSCOPY/SWALLOW: CPT | Mod: GN

## 2023-06-14 PROCEDURE — 120N000001 HC R&B MED SURG/OB

## 2023-06-14 PROCEDURE — 87205 SMEAR GRAM STAIN: CPT | Performed by: PHYSICIAN ASSISTANT

## 2023-06-14 PROCEDURE — 82310 ASSAY OF CALCIUM: CPT | Performed by: PHYSICIAN ASSISTANT

## 2023-06-14 PROCEDURE — 97165 OT EVAL LOW COMPLEX 30 MIN: CPT | Mod: GO

## 2023-06-14 PROCEDURE — 74230 X-RAY XM SWLNG FUNCJ C+: CPT

## 2023-06-14 PROCEDURE — 250N000013 HC RX MED GY IP 250 OP 250 PS 637: Performed by: PHYSICIAN ASSISTANT

## 2023-06-14 PROCEDURE — 99233 SBSQ HOSP IP/OBS HIGH 50: CPT | Performed by: HOSPITALIST

## 2023-06-14 PROCEDURE — 250N000011 HC RX IP 250 OP 636: Performed by: PHYSICIAN ASSISTANT

## 2023-06-14 PROCEDURE — 36415 COLL VENOUS BLD VENIPUNCTURE: CPT | Performed by: PHYSICIAN ASSISTANT

## 2023-06-14 PROCEDURE — 97535 SELF CARE MNGMENT TRAINING: CPT | Mod: GO

## 2023-06-14 PROCEDURE — G0425 INPT/ED TELECONSULT30: HCPCS | Mod: G0 | Performed by: NURSE PRACTITIONER

## 2023-06-14 PROCEDURE — 92526 ORAL FUNCTION THERAPY: CPT | Mod: GN

## 2023-06-14 PROCEDURE — 92610 EVALUATE SWALLOWING FUNCTION: CPT | Mod: GN

## 2023-06-14 PROCEDURE — 85027 COMPLETE CBC AUTOMATED: CPT | Performed by: PHYSICIAN ASSISTANT

## 2023-06-14 RX ORDER — HYDROXYZINE HYDROCHLORIDE 25 MG/1
25 TABLET, FILM COATED ORAL EVERY 12 HOURS PRN
Status: DISCONTINUED | OUTPATIENT
Start: 2023-06-14 | End: 2023-06-15 | Stop reason: ALTCHOICE

## 2023-06-14 RX ADMIN — CLOPIDOGREL BISULFATE 75 MG: 75 TABLET ORAL at 09:57

## 2023-06-14 RX ADMIN — AMPICILLIN SODIUM, SULBACTAM SODIUM 3 G: 2; 1 INJECTION, POWDER, FOR SOLUTION INTRAMUSCULAR; INTRAVENOUS at 19:59

## 2023-06-14 RX ADMIN — ATORVASTATIN CALCIUM 80 MG: 40 TABLET, FILM COATED ORAL at 19:59

## 2023-06-14 RX ADMIN — SENNOSIDES AND DOCUSATE SODIUM 1 TABLET: 50; 8.6 TABLET ORAL at 09:56

## 2023-06-14 RX ADMIN — DULOXETINE HYDROCHLORIDE 60 MG: 60 CAPSULE, DELAYED RELEASE ORAL at 09:56

## 2023-06-14 RX ADMIN — QUETIAPINE FUMARATE 100 MG: 50 TABLET ORAL at 21:14

## 2023-06-14 RX ADMIN — AMPICILLIN SODIUM, SULBACTAM SODIUM 3 G: 2; 1 INJECTION, POWDER, FOR SOLUTION INTRAMUSCULAR; INTRAVENOUS at 15:15

## 2023-06-14 RX ADMIN — TERAZOSIN HYDROCHLORIDE 10 MG: 5 CAPSULE ORAL at 10:00

## 2023-06-14 RX ADMIN — ASPIRIN 325 MG: 325 TABLET, COATED ORAL at 09:59

## 2023-06-14 RX ADMIN — ACETAMINOPHEN 650 MG: 325 TABLET, FILM COATED ORAL at 02:01

## 2023-06-14 RX ADMIN — NICOTINE 1 PATCH: 21 PATCH, EXTENDED RELEASE TRANSDERMAL at 10:01

## 2023-06-14 RX ADMIN — SENNOSIDES AND DOCUSATE SODIUM 1 TABLET: 50; 8.6 TABLET ORAL at 19:59

## 2023-06-14 RX ADMIN — AMPICILLIN SODIUM, SULBACTAM SODIUM 3 G: 2; 1 INJECTION, POWDER, FOR SOLUTION INTRAMUSCULAR; INTRAVENOUS at 02:01

## 2023-06-14 RX ADMIN — ENOXAPARIN SODIUM 40 MG: 40 INJECTION SUBCUTANEOUS at 19:59

## 2023-06-14 RX ADMIN — AMPICILLIN SODIUM, SULBACTAM SODIUM 3 G: 2; 1 INJECTION, POWDER, FOR SOLUTION INTRAMUSCULAR; INTRAVENOUS at 10:04

## 2023-06-14 ASSESSMENT — ACTIVITIES OF DAILY LIVING (ADL)
ADLS_ACUITY_SCORE: 30
ADLS_ACUITY_SCORE: 31
ADLS_ACUITY_SCORE: 30
PREVIOUS_RESPONSIBILITIES: MEDICATION MANAGEMENT;DRIVING
ADLS_ACUITY_SCORE: 31
ADLS_ACUITY_SCORE: 30
ADLS_ACUITY_SCORE: 31

## 2023-06-14 NOTE — PLAN OF CARE
ROOM # 509    Living Situation (if not independent, order SW consult): home with spouse  Facility name:  : [facility] ([facility])    Activity level at baseline: Ind  Activity level on admit: SBA    Who will be transporting you at discharge: spouse     Patient registered to observation; given Patient Bill of Rights; given the opportunity to ask questions about observation status and their plan of care.  Patient has been oriented to the observation room, bathroom and call light is in place.    Discussed discharge goals and expectations with patient/family.

## 2023-06-14 NOTE — CONSULTS
[facility]    Stroke Consult Note    Reason for Consult:  stroke    Chief Complaint: Extremity Weakness       HPI  Dinesh Farfan is a 57 year old male with history significant for tobacco use and colon cancer s/p chemotherapy. He was admitted to [facility] 6/11-6/12 for L MCA stroke in the setting of chronic appearing L CCA/ICA occlusion. He returned to the ED 6/13/23 for evaluation of worsening left facial weakness, worsening aphasia, RUE weakness that started at 0900. EMS noted that he was hypoxic into the low 80s and hypotensive with SBPs in the mid-80s. He was found to have suspected aspiration pneumonia.     Repeat MRI brain shows unchanged size of the L MCA infarct but more confluence within the stroke bed. On exam, patient and wife report significant improvement in his language overnight. He continues to have moderate aphasia.     Stroke Evaluation Summarized    MRI/Head CT MRI 6/13: Evolving areas of recent infarct in the anterior left middle cerebral artery territory affecting the frontal lobe and insula. The areas of ischemic tissue are slightly more confluent but overall unchanged in size.   Intracranial Vasculature 1.  Decreased filling of anterior division M2 middle cerebral artery branches on the left versus 6/11/2023 consistent with vessel occlusion. This generally corresponds to the area of infarct  identified on CT and MRI.  2.  The more proximal M1 segment of the left middle cerebral artery and posterior division M2 branches appear patent.  3.  The proximal intracranial segment of the left internal carotid artery remains occluded until the level of the clinoid process, possibly slightly progressed.   Cervical Vasculature 1.  Unchanged occlusion of the left common and internal carotid arteries. Filling of the external branches is presumably via collaterals.  2.  Mild right carotid artery atherosclerosis without significant narrowing.  3.  Moderate to severe narrowing at  "the origin of the right vertebral artery unchanged. No dissection.       LDL  6/11/2023: 92 mg/dL   A1C  6/11/2023: 6.0 %   Troponin 6/13/2023: <6 ng/L       Impression  57 M with known L CCA/ICA occlusion and recent R MCA territory infarct with acute worsening in the setting of hypotension and hypoxia. MRI brain with more confluent area of infarct.     Recommendations  - Continue DAPT with  mg + Plavix 75 mg for 90 days, then  mg alone indefinitely   - Continue Lipitor 80 mg. Titrate to goal LDL 40-70 in the outpatient setting  - A1c 6.0, within goal <7.0  - Would not acutely lower BP while inpatient given L CCA/ICA occlusion and worsening infarct. Slow down titration over the next several weeks. Long term goal BP <140/90 with tighter control associated with decreased overall CV risk, if tolerated. Discussed the importance of home BP monitoring and keeping a log for PCP.  - PT/OT/SLP  - Smoking cessation  - Repeat CUS in 3 months to evaluate for stability of R ICA (currently ~20% stenosis)  - EDUARDO evaluation   - Had initially discussed possible need for CT CAP given concern for PE. Okay to defer has this has been ruled out.    Patient Follow-up    Follow-up ordered on previous admission.     Thank you for this consult. No further stroke evaluation is recommended, so we will sign off. Please contact us with any additional questions.    Keerthi Dunn, CNP  Vascular Neurology    To page me or covering stroke neurology team member, click here: [facility]  Choose \"On Call\" tab at top, then select \"NEUROLOGY/ALL SITES\" from middle drop-down box, press Enter, then look for \"stroke\" or \"telestroke\" for your site.    _____________________________________________________    Clinically Significant Risk Factors                         # Overweight: Estimated body mass index is 28.49 kg/m  as calculated from the following:    Height as of this encounter: 1.753 m (5' 9\").    Weight as of this encounter: 87.5 kg (192 lb " 14.4 oz)., PRESENT ON ADMISSION            Past Medical History   Past Medical History:   Diagnosis Date     Herniated lumbar disc without myelopathy      Past Surgical History   No past surgical history on file.  Medications   Home Meds  Prior to Admission medications    Medication Sig Start Date End Date Taking? Authorizing Provider   aspirin (ASA) 325 MG EC tablet Take 1 tablet (325 mg) by mouth daily 6/13/23  Yes Everton Arndt MD   clopidogrel (PLAVIX) 75 MG tablet 1 tablet (75 mg) by Oral or NG Tube route daily 6/13/23  Yes Everton Arndt MD   DULoxetine (CYMBALTA) 60 MG capsule Take 60 mg by mouth daily   Yes Unknown, Entered By History   HYDROcodone-acetaminophen (NORCO)  MG per tablet Take 1 tablet by mouth 4 times daily   Yes Reported, Patient   hydrOXYzine (ATARAX) 25 MG tablet Take 25 mg by mouth 3 times daily as needed for anxiety   Yes Unknown, Entered By History   ibuprofen (ADVIL/MOTRIN) 800 MG tablet Take 800 mg by mouth every 8 hours as needed for moderate pain   Yes Unknown, Entered By History   nicotine 21-14-7 MG/24HR KIT Place 1 patch onto the skin daily 6/12/23  Yes Everton Arndt MD   QUEtiapine (SEROQUEL) 50 MG tablet Take  mg by mouth At Bedtime   Yes Unknown, Entered By History   terazosin (HYTRIN) 10 MG capsule Take 10 mg by mouth daily   Yes Reported, Patient   traZODone (DESYREL) 50 MG tablet Take  mg by mouth nightly as needed for sleep   Yes Unknown, Entered By History   atorvastatin (LIPITOR) 80 MG tablet Take 1 tablet (80 mg) by mouth every evening 6/12/23   Everton Arndt MD       Scheduled Meds    ampicillin-sulbactam  3 g Intravenous Q6H     aspirin  325 mg Oral Daily     atorvastatin  80 mg Oral QPM     clopidogrel  75 mg Oral or NG Tube Daily     DULoxetine  60 mg Oral Daily     enoxaparin ANTICOAGULANT  40 mg Subcutaneous Q24H     nicotine  1 patch Transdermal Daily     nicotine   Transdermal Q8H     QUEtiapine   mg Oral At Bedtime     senna-docusate  1  tablet Oral BID    Or     senna-docusate  2 tablet Oral BID     terazosin  10 mg Oral Daily       Infusion Meds      PRN Meds  acetaminophen **OR** acetaminophen, ipratropium - albuterol 0.5 mg/2.5 mg/3 mL, lidocaine (buffered or not buffered), melatonin, ondansetron **OR** ondansetron, prochlorperazine **OR** prochlorperazine **OR** prochlorperazine, traZODone    Allergies   Allergies   Allergen Reactions     Morphine Unknown     Takes hydrocodone without problem.     Family History   Family History   Problem Relation Age of Onset     Family History Negative Mother      Family History Negative Father      Social History   Social History     Tobacco Use     Smoking status: Every Day     Packs/day: 1.00     Years: 0.00     Pack years: 0.00     Types: Cigarettes   Substance Use Topics     Alcohol use: Yes     Comment: rarely     Drug use: No       Review of Systems   The 10 point Review of Systems is negative other than noted in the HPI or here.        PHYSICAL EXAMINATION   Temp:  [98  F (36.7  C)-98.4  F (36.9  C)] 98.1  F (36.7  C)  Pulse:  [] 100  Resp:  [16-28] 28  BP: (143-151)/(66-71) 143/68  SpO2:  [92 %-95 %] 93 %    Neuro Exam  Mental Status:  alert, incorrectly states month and age, names 3/3 objects, imapaired speech fluency, reading intact, moderate expressive aphasia  Cranial Nerves:  visual fields intact (tested by nurse), EOMI with normal smooth pursuit, facial sensation intact and symmetric (tested by nurse), hearing not formally tested but intact to conversation, no dysarthria, shoulder shrug equal bilaterally, tongue protrusion midline, right facial droop  Motor:  no abnormal movements, able to move all limbs antigravity spontaneously with no signs of hemiparesis observed, no pronator drift  Reflexes:  unable to test (telestroke)  Sensory:  light touch sensation intact and symmetric throughout upper and lower extremities (assessed by nurse), no extinction on double simultaneous stimulation  (assessed by nurse)  Coordination:  FTN without dysmetria  Station/Gait:  unable to test due to telestroke    Dysphagia Screen  Dysarthria or facial droop present - Maintain NPO, consult SLP    Stroke Scales    NIHSS  1a. Level of Consciousness 0-->Alert, keenly responsive   1b. LOC Questions 2-->Answers neither question correctly   1c. LOC Commands 0-->Performs both tasks correctly   2.   Best Gaze 0-->Normal   3.   Visual 0-->No visual loss   4.   Facial Palsy 1-->Minor paralysis (flattened nasolabial fold, asymmetry on smiling)   5a. Motor Arm, Left 0-->No drift, limb holds 90 (or 45) degrees for full 10 secs   5b. Motor Arm, Right 0-->No drift, limb holds 90 (or 45) degrees for full 10 secs   6a. Motor Leg, Left 0-->No drift, leg holds 30 degree position for full 5 secs   6b. Motor Leg, right 0-->No drift, leg holds 30 degree position for full 5 secs   7.   Limb Ataxia 0-->Absent   8.   Sensory 0-->Normal, no sensory loss   9.   Best Language 1-->Mild-to-moderate aphasia, some obvious loss of fluency or facility of comprehension, without significant limitation on ideas expressed or form of expression. Reduction of speech and/or comprehension, however, makes conversation. . . (see row details)   10. Dysarthria 0-->Normal   11. Extinction and Inattention  0-->No abnormality   Total 4 (06/14/23 1615)       Imaging  I personally reviewed all imaging; relevant findings per HPI.    Labs Data   CBC  Recent Labs   Lab 06/14/23  0603 06/13/23  1027 06/12/23  0610   WBC 6.4 6.3 6.8   RBC 4.15* 4.58 4.25*   HGB 13.2* 14.6  14.6 13.3   HCT 38.0* 42.1  43 39.8*    178 177     Basic Metabolic Panel   Recent Labs   Lab 06/14/23  0603 06/13/23  1027 06/13/23  1024 06/12/23  0610    139  139  --  138   POTASSIUM 4.1 3.9  3.9  --  4.2   CHLORIDE 104 101  104  --  105   CO2 24 25  --  26   BUN 14.3 14  13.2  --  19.3   CR 0.72 0.81  0.7  --  0.80   GLC 96 141*  141*   < > 126*   DUSTIN 8.6 9.1  --  8.5*    < > =  values in this interval not displayed.     Liver Panel  No results for input(s): PROTTOTAL, ALBUMIN, BILITOTAL, ALKPHOS, AST, ALT, BILIDIRECT in the last 168 hours.  INR    Recent Labs   Lab Test 06/13/23  1027 06/11/23  1220   INR 0.96 0.91      Lipid Profile    Recent Labs   Lab Test 06/11/23  1220   CHOL 168   HDL 44   LDL 92   TRIG 162*     A1C    Recent Labs   Lab Test 06/11/23  1220   A1C 6.0*     Troponin    Recent Labs   Lab 06/13/23  1027 06/11/23  2009 06/11/23  1220   CTROPT <6 7 6          Stroke Consult Data Data   Telestroke Service Details  (for non-emergent stroke consult with tele)  Video start time 06/14/23   1210   Video end time 06/14/23   1225   Type of service telemedicine diagnostic assessment of acute neurological changes   Reason telemedicine is appropriate patient requires assessment with a specialist for diagnosis and treatment of neurological symptoms   Mode of transmission secure interactive audio and video communication per Luis   Originating site (patient location) [facility]    Distant site (provider location) [facility]     I have personally spent a total of 50 minutes providing care today, time spent in reviewing medical records and reviewing tests, examining the patient and obtaining history, coordination of care, and discussion with the patient and/or family regarding diagnostic results, prognosis, symptom management, risks and benefits of management options, and development of plan of care. Greater than 50% was spent in counseling and coordination of care.

## 2023-06-14 NOTE — PROGRESS NOTES
"   06/14/23 1100   Appointment Info   Signing Clinician's Name / Credentials (SLP) Krystle Palomino M.A. CCC-SLP   General Information   Onset of Illness/Injury or Date of Surgery 06/11/23   Referring Physician Morro Mesa MD   Patient/Family Therapy Goal Statement (SLP) Pt is cooperative, denies difficulty swallowing but is willing to participate.   Pertinent History of Current Problem \"Recurrent right facial droop, dysarthria in the setting of recent dx of Left MCA CVA.\"     SLP recommended VFSS for closer assessment of aspiration risk due to worsened stroke symptoms and hypoxia.   General Observations alert, cooperative   Type of Evaluation   Type of Evaluation Swallow Evaluation   General Swallowing Observations   Swallowing Evaluation Videofluoroscopic swallow study (VFSS)   Clinical Swallow Evaluation   Feeding Assistance no assistance needed   VFSS Evaluation   Radiologist Dr. Jordan   Views Taken left lateral   Physical Location of Procedure [facility] Radiology Department   VFSS Textures Trialed thin liquids;pureed;solid foods   VFSS Eval: Thin Liquid Texture Trial   Mode of Presentation, Thin Liquid cup;straw;self-fed   Order of Presentation 1,2,3,6,7,8,9,10   Preparatory Phase WFL   Oral Phase, Thin Liquid WFL   Bolus Location When Swallow Triggered posterior angle of ramus   Pharyngeal Phase, Thin Liquid impaired epiglottic movement;impaired tongue base retraction   Rosenbek's Penetration Aspiration Scale: Thin Liquid Trial Results 5 - contrast contacts vocal cords, visible residue remains (penetration)   Strategies and Compensations chin tuck;reduce bolus size   Diagnostic Statement deep penetration with visable residue x1 by cup, x1 by straw. Chin tuck strategy appeared to eliminate deep penetration, and aspiration risk.   VFSS Evaluation: Puree Solid Texture Trial   Mode of Presentation, Puree spoon   Order of Presentation 4   Preparatory Phase WFL   Oral Phase, Puree " WFL   Bolus Location When Swallow Triggered posterior angle of ramus   Pharyngeal Phase, Puree WFL   Rosenbek's Penetration Aspiration Scale: Puree Food Trial Results 1 - no aspiration, contrast does not enter airway   Diagnostic Statement WFL with puree - no pharyngeal residue no penetration or aspiration   VFSS Evaluation: Solid Food Texture Trial   Mode of Presentation, Solid fed by clinician;straw   Order of Presentation 5   Preparatory Phase WFL   Oral Phase, Solid WFL   Bolus Location When Swallow Triggered posterior angle of ramus   Pharyngeal Phase, Solid WFL   Rosenbek's Penetration Aspiration Scale: Solid Food Trial Results 1 - no aspiration, contrast does not enter airway   Diagnostic Statement WFL with regular - no pharyngeal residue no penetration or aspiration   Esophageal Phase of Swallow   Patient reports or presents with symptoms of esophageal dysphagia No   Esophageal sweep performed during today s vidofluoroscopic exam  No  (not indicated)   Swallowing Recommendations   Diet Consistency Recommendations regular diet;thin liquids (level 0)   Mode of Delivery Recommendations bolus size, small;no straws;slow rate of intake   Postural Recommendations chin tuck   Swallowing Maneuver Recommendations alternate food and liquid intake   Monitoring/Assistance Required (Eating/Swallowing) monitor for cough or change in vocal quality with intake;stop eating activities when fatigue is present   Recommended Feeding/Eating Techniques (Swallow Eval) maintain upright sitting position for eating   Medication Administration Recommendations, Swallowing (SLP) ok for whole pills one at a time with H20   Instrumental Assessment Recommendations instrumental evaluation not recommended at this time   General Therapy Interventions   Planned Therapy Interventions Dysphagia Treatment  (rec speech and language intervention as well)   Dysphagia treatment Instruction of safe swallow strategies;Oropharyngeal exercise training    Clinical Impression   Criteria for Skilled Therapeutic Interventions Met (SLP Eval) Yes, treatment indicated   SLP Diagnosis mild to moderate oropharyngeal dysphagia   Risks & Benefits of therapy have been explained evaluation/treatment results reviewed;care plan/treatment goals reviewed;current/potential barriers reviewed;participants voiced agreement with care plan;participants included;patient   Clinical Impression Comments Videoswallow study completed. Under fluroscopy the patient demonstrated mild to moderate oropharyngeal dysphagia, resulting from reduced epiglottic inversion. Swallow function was WNL with puree and regular solids. Elevated aspiration risk is present with thin consistency liquids as deep penetration to the cords with visible residue was noted twice with thin (by cup once, by straw once). Small sips and a chin tuck strategy appeared to improve airway protection.     Recommend the patient continue a regular texture diet and thin liquids with careful safety precautions (no straws, one small sip at a time with a chin tuck strategy, sit fully upright, avoid large consecutive swallows).     SLP will follow for dysphagia tx for safe swallow strategy training and pharyngeal strength ex, and also to resume speech-language intervention for aphasia.   SLP Total Evaluation Time   Evaluation, videofluoroscopic eval of swallow function Minutes (46972) 20   SLP Goals   Therapy Frequency (SLP Eval) daily   SLP Predicted Duration/Target Date for Goal Attainment 06/27/23   SLP Goals Swallow;SLP Goal 1   SLP: Safely tolerate diet without signs/symptoms of aspiration Regular diet;Thin liquids;With use of compensatory swallow strategies;Independently   SLP: Goal 1 The patient will complete pharyngeal strength ex x10 repetitions with 90% accuracy and independence.   Interventions   Interventions Quick Adds Swallowing Dysfunction   Swallowing Dysfunction &/or Oral Function for Feeding   Treatment of Swallowing  Dysfunction &/or Oral Function for Feeding Minutes (62557) 15   Treatment Detail/Skilled Intervention SLP reviewed VFSS imaging with the pateint. Educated him about aspiration risk, diet rec & recommended safety precautions to reduce aspiration risk (avoid straws, sit upright, take small sips - one at a time, use chin tuck when drinking). The patient voiced agreement and understanding.   SLP Discharge Planning   SLP Plan diet tolerance, pharyngeal strength ex, strategy training, speech-language eval   SLP Discharge Recommendation home with outpatient speech therapy;[facility]-Motivated patient will benefit from intensive, interdisciplinary therapy.  Anticipate will be able to tolerate 3 hours of therapy per day  (Pt is a good candidate for intensive rehab from SLP perspective.)   SLP Rationale for DC Rec SLP will follow for dysphagia tx for safe swallow strategy training and pharyngeal strength ex, and also to resume speech-language intervention for aphasia.   SLP Brief overview of current status  Recommend the patient continue a regular texture diet and thin liquids with careful safety precautions (no straws, one small sip at a time with a chin tuck strategy, sit fully upright, avoid large consecutive swallows).   Total Session Time   Total Session Time (sum of timed and untimed services) 35

## 2023-06-14 NOTE — PROGRESS NOTES
"[facility]     Date: 6/13/23  Admission Dx: Stroke   Pulmonary History: history of tobacco use disorder  Home Nebulizer/MDI Use: n/a  Home Oxygen: n/a  Acuity Level (RCAT flow sheet): 4  Aerosol Therapy initiated: Duoneb Q4 prn      Volume Expansion initiated: incentive spirometer        Current Oxygen Requirements: RA  Current SpO2: mid 90's    Re-evaluation date: 6/16/23    Patient Education: Will continue to do education with patient.         See \"RT Assessments\" flow sheet for patient assessment scoring and Acuity Level Details.             "

## 2023-06-14 NOTE — PROGRESS NOTES
"   06/14/23 0900   Appointment Info   Signing Clinician's Name / Credentials (SLP) Krystle Palomino M.A. CCC-SLP   General Information   Onset of Illness/Injury or Date of Surgery 06/11/23   Referring Physician Bambi Maya PA-C   Patient/Family Therapy Goal Statement (SLP) Pt is motivated to improve his speech.   Pertinent History of Current Problem \"Recurrent right facial droop, dysarthria in the setting of recent dx of Left MCA CVA.\" SLP completed bedside swallow eval at 8:40 AM 6/14/23.   General Observations Easily awoke and was cooperative with SLP.   Type of Evaluation   Type of Evaluation Swallow Evaluation   Oral Motor   Oral Musculature anomalies present   Mucosal Quality good   Dentition (Oral Motor)   Dentition (Oral Motor) natural dentition;adequate dentition   Facial Symmetry (Oral Motor)   Facial Symmetry (Oral Motor)   (involuntary smile was symetrical)   Comment, Facial Symmetry (Oral Motor) initially had R facial droop, new admit with L facial droop   Lip Function (Oral Motor)   Lip Range of Motion (Oral Motor) protrusion impairment;retraction impairment   Protrusion, Lip Range of Motion bilateral;minimal impairment   Tongue Function (Oral Motor)   Tongue ROM (Oral Motor) depression is impaired;elevation is impaired;protrusion is impaired   Cough/Swallow/Gag Reflex (Oral Motor)   Soft Palate/Velum (Oral Motor) WNL   Gag Reflex (Oral Motor) not tested   Volitional Throat Clear/Cough (Oral Motor) WNL   Volitional Swallow (Oral Motor) WNL   Vocal Quality/Secretion Management (Oral Motor)   Vocal Quality (Oral Motor) WFL   Secretion Management (Oral Motor)   (coughing up mucous into a cup)   General Swallowing Observations   Current Diet/Method of Nutritional Intake (General Swallowing Observations, NIS) NPO   Respiratory Support (General Swallowing Observations) none  (congested cough noted)   Past History of Dysphagia no prior hx of dysphagia reported by pt or listed in EMR   Swallowing " Evaluation Clinical swallow evaluation   Clinical Swallow Evaluation   Feeding Assistance no assistance needed   Additional evaluation(s) completed today Recommended   Clinical Swallow Evaluation Textures Trialed thin liquids;pureed;solid foods   Clinical Swallow Eval: Thin Liquid Texture Trial   Mode of Presentation, Thin Liquids spoon;cup;straw;fed by clinician;self-fed   Volume of Liquid or Food Presented ice chips, 8 oz thin H20   Oral Phase of Swallow WFL   Pharyngeal Phase of Swallow intact   Diagnostic Statement No immediate direct signs or symptoms of aspiration. Delayed, wet cough noted after PO trials.   Clinical Swallow Evaluation: Puree Solid Texture Trial   Mode of Presentation, Puree spoon;self-fed   Volume of Puree Presented 100% applesauce cup   Oral Phase, Puree WFL   Pharyngeal Phase, Puree intact   Diagnostic Statement No immediate direct signs or symptoms of aspiration. Delayed, wet cough noted after PO trials.   Clinical Swallow Evaluation: Solid Food Texture Trial   Mode of Presentation self-fed   Volume Presented kavon crackers   Oral Phase   (mildly slow mastication)   Oral Residue   (trace residue laterally on R and L)   Pharyngeal Phase intact   Diagnostic Statement No immediate direct signs or symptoms of aspiration. Delayed, wet cough noted after PO trials.   Esophageal Phase of Swallow   Patient reports or presents with symptoms of esophageal dysphagia No   Swallowing Recommendations   Diet Consistency Recommendations regular diet;thin liquids (level 0)   Mode of Delivery Recommendations bolus size, small;slow rate of intake   Swallowing Maneuver Recommendations alternate food and liquid intake   Monitoring/Assistance Required (Eating/Swallowing) monitor for cough or change in vocal quality with intake   Recommended Feeding/Eating Techniques (Swallow Eval) maintain upright sitting position for eating   Medication Administration Recommendations, Swallowing (SLP) ok for whole pills one at  a time with H20   Instrumental Assessment Recommendations VFSS (videofluoroscopic swallowing study)  (recommended to rule out silent aspiration due to delayed cough and MD concern for aspiration pneumonia)   General Therapy Interventions   Planned Therapy Interventions Dysphagia Treatment  (rec speech and language intervention as well)   Clinical Impression   Criteria for Skilled Therapeutic Interventions Met (SLP Eval) Yes, treatment indicated   SLP Diagnosis mild oropharyngeal dysphagia   Risks & Benefits of therapy have been explained evaluation/treatment results reviewed;care plan/treatment goals reviewed;participants included;patient;participants voiced agreement with care plan   Clinical Impression Comments Clinical dysphagia eval completed per MD order. The patient presents with oral motor deficits, participation in formal eval difficult due to oral apraxia for volitional movements. Although the patient did not present with immediate signs/symptoms of aspiration he did have delayed, wet cough following PO trials. MD note also lists concern for aspiration pneumonia. Recommend initiation of a regular texture diet and thin liquids. SLP is following up with a videoswallow study today  At 10:30 AM to rule out silent aspiration and will update recommendations as indicated pending results. Anticipate pt will also require intensive speech-language intervention (from SLP standpoint is a candidate for ARU or OP SLP for aphasia).   SLP Total Evaluation Time   Eval: oral/pharyngeal swallow function, clinical swallow Minutes (61861) 20   SLP Goals   Therapy Frequency (SLP Eval) daily   SLP Predicted Duration/Target Date for Goal Attainment 06/27/23   SLP Goals Swallow   SLP: Safely tolerate diet without signs/symptoms of aspiration Regular diet;Thin liquids;Independently;With use of swallow precautions   Interventions   Interventions Quick Adds Swallowing Dysfunction   SLP Discharge Planning   SLP Plan VFSS, diet  tolerance, speech-language eval   SLP Discharge Recommendation home with outpatient speech therapy   SLP Rationale for DC Rec VFSS to rule out aspiration; anticipate ongoing SLP needs for aphasia after hospital discharge   SLP Brief overview of current status  Recommend a regular texture diet and thin liquids. SLP will f/u with a VFSS and will update recommendatios as indicated.   Total Session Time   Total Session Time (sum of timed and untimed services) 20

## 2023-06-14 NOTE — PLAN OF CARE
Goal Outcome Evaluation:    Major Shift Events: A&Ox3, having word finding difficulty and slow to respond with questions. Neuro following, reported significant improvement from overnight. Sputum culture collected, tolerating Unasyn preparatively. Swallow study done, reg diet w/ thin liquids. Pt req PTA Norco, and antianxiety medication, Hospitalist and X cover both paged, awaiting orders.     Treatment Plan: Cont ASA and Plavix. Monitoring for worsening speech and facial drooping.

## 2023-06-14 NOTE — PROGRESS NOTES
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  CT of the head showed evidence of early infarct in the left MCA distribution involving the left insula without hemorrhage.  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 MRI brain confirmed acute/subacute left MCA territory ischemic injury without hemorrhagic conversion. Seen by Stroke Neurology and did not recommend any surgical intervention due to chronic L CCA/ICA occlusion. Recommended dual anti-plt therapy for 90 days and started statin for further risk stratification.   He comes back to ED today due to acute episode of recurrent left facial droop, worsening aphasia and RUE weakness around 9:00am. Also was reported to be hypoxic in the 80's and hypotensive with SBP in the 60-70's when evaluated by the EMS.      Since arrival to the ED, his BP improved with 1L NS, to SBP in the 150's. He was able to weaned off O2. CT chest shows no definitive PE but a small filling defect in the right lower lobe segmental pulm arteries. There's also right sided prominence with area of bronchiolar mucoid impaction suggestive of infection. Pt does have normal WBC, normal pro calcitonin and lactic acid though he did continue to sound ronchus with mucous and had obvious dysarthria concerning for possible aspiration. Pt has been started on Unasyn.   Stroke Neuro was called again and recommended repeat MRI of the brain.    He will be  "admitted to the hospital for definitive cares .     #Acute episode of hypoxia - now resolved, Suspect less likely PE but possible aspiration PNA   Unclear cause of transient hypoxia, but has been weaned off O2 and now 96% on RA. He denies CP, SOB, no pleuritic CP. I think PE is somewhat less likely given negative doppler US DVT and not hypoxic but did have recent hospitalization and hx of colon ca.   There is e/o mucoid impaction on RLL on CT and wife describes pt sounding \"gurgly\" in the last few days and now with apparent dysarthria and difficulty producing a cough I'm more inclined to think it might be more aspiration.   Plan:  -  Cont Unasyn for possible aspiration PNA   -  Order by speech therapy  -  Cont to monitor, PRN O2 if needed   -  Duonebs QID      #Episode of hypotension - resolved. Do not suspect sepsis given normal LA.   Was hypotension in the 70's with EMS but 100's in ED.   S/p 1L bolus with improved BP in the 140's.   -  Cont to monitor         #Recurrent right facial droop, dysarthria in the setting of recent dx of Left MCA CVA  Likely recrudescence of sxs due to acute illness rather than new CVA  -  repeat MRI today shows more \"confluence\" of her recent infarct of the anterior left MCA but no new infarct or hemorrhagic transformation.   -  On-going right facial droop and dysarthria  -  IVF hydration  -  Cont  and Plavix   -  On empiric Lovenox dosing for DVT prophylaxis   -  Stroke Neuro consulted      # On-going tobacco abuse   -  Smoking cessation counseled   -  Cont nicotine patch     #HLP   -  resume statin     #Anxiety  - Resume Cymbalta, atarax and seroquel for sleep    DVT Prophylaxis: Enoxaparin (Lovenox) SQ  Code Status: Full Code    Disposition: Expected discharge in next 2 days.    Interval History   He is much better today, less shortness of breath.  Wife is present in the room and she says he is 100% better than he was regarding breathing.  Neurological symptoms appear to be " resolving.  Stroke was consulted for recommendations.  Speech therapy has seen the patient and made diet recommendations    -Data reviewed today: I reviewed all new labs and imaging results over the last 24 hours.     Physical Exam   Temp: 98.1  F (36.7  C) Temp src: Oral BP: (Abnormal) 143/68 Pulse: 100   Resp: 28 SpO2: 93 % O2 Device: None (Room air)    Vitals:    06/13/23 1021 06/13/23 1819 06/14/23 0632   Weight: 88.3 kg (194 lb 10.7 oz) 87.2 kg (192 lb 3.9 oz) 87.5 kg (192 lb 14.4 oz)     Vital Signs with Ranges  Temp:  [98  F (36.7  C)-98.4  F (36.9  C)] 98.1  F (36.7  C)  Pulse:  [] 100  Resp:  [16-28] 28  BP: (143-151)/(66-71) 143/68  SpO2:  [92 %-95 %] 93 %  No intake/output data recorded.    GEN:  Alert, oriented x 3, appears comfortable, NAD.  HEENT:  Normocephalic/atraumatic, no scleral icterus, no nasal discharge, mouth moist.  CV:  Regular rate and rhythm, no murmur or JVD.  S1 + S2 noted, no S3 or S4.  LUNGS:  Clear to auscultation bilaterally without rales/rhonchi/wheezing/retractions.  Symmetric chest rise on inhalation noted.  ABD:  Active bowel sounds, soft, non-tender/non-distended.  No rebound/guarding/rigidity.  EXT:  No edema or cyanosis.  No joint synovitis noted.  SKIN:  Dry to touch, no exanthems noted in the visualized areas.       Medications       ampicillin-sulbactam  3 g Intravenous Q6H     aspirin  325 mg Oral Daily     atorvastatin  80 mg Oral QPM     clopidogrel  75 mg Oral or NG Tube Daily     DULoxetine  60 mg Oral Daily     enoxaparin ANTICOAGULANT  40 mg Subcutaneous Q24H     nicotine  1 patch Transdermal Daily     nicotine   Transdermal Q8H     QUEtiapine   mg Oral At Bedtime     senna-docusate  1 tablet Oral BID    Or     senna-docusate  2 tablet Oral BID     terazosin  10 mg Oral Daily       Data   Recent Labs   Lab 06/14/23  0603 06/13/23  1027 06/13/23  1024 06/12/23  0610 06/11/23  2228 06/11/23  1220   WBC 6.4 6.3  --  6.8  --  6.4   HGB 13.2* 14.6  14.6  --   13.3  --  14.1   MCV 92 92  --  94  --  93    178  --  177  --  179   INR  --  0.96  --   --   --  0.91    139  139  --  138  --  137   POTASSIUM 4.1 3.9  3.9  --  4.2  --  3.7   CHLORIDE 104 101  104  --  105  --  100   CO2 24 25  --  26  --  26   BUN 14.3 14  13.2  --  19.3  --  16.7   CR 0.72 0.81  0.7  --  0.80  --  0.81   ANIONGAP 9 10  --  7  --  11   DUSTIN 8.6 9.1  --  8.5*  --  9.0   GLC 96 141*  141*   < > 126*   < > 104*    < > = values in this interval not displayed.       Recent Results (from the past 24 hour(s))   XR Video Swallow with SLP or OT    Narrative    VIDEO SWALLOW WITH SLP OR OT June 14, 2023 10:58 AM     HISTORY: Aspiration risk assessment.    COMPARISON: None available.    FINDINGS: A swallow study was performed in coordination with a member  from the speech pathology service. Total fluoroscopy time was 1.3  minutes. Ten spot fluoroscopic images, and/or cine clips were  obtained. One view in lateral projection. Various consistencies of  barium were given to the patient to swallow, and the swallowing  mechanism was observed using fluoroscopy.    Occasional deep penetration and aspiration noted with thin barium. No  penetration or aspiration noted with other consistencies of barium.      Impression    IMPRESSION:  1. Occasional deep penetration or aspiration noted with thin barium.  No penetration or aspiration with other consistencies of barium.  2. Please refer to the speech pathology report for further details.    This study includes only the cervical esophagus.    SILVINA BATISTA MD         SYSTEM ID:  [id number]         Securely message with the Vocera Web Console (learn more here)  Text page via [employer] Paging/Directory        Disclaimer: This note consists of symbols derived from keyboarding, dictation and/or voice recognition software. As a result, there may be errors in the script that have gone undetected. Please consider this when interpreting information found in  this chart.

## 2023-06-14 NOTE — PLAN OF CARE
Physical therapy:    PT orders received and acknowledged. Per chart and conversation with OT, pt appears to be mobilizing at functional baseline. OT has evaluated pt and will be able to address pt mobility during hospitalization. IP PT not currently indicated, will complete orders and defer to OT + care team for discharged recommendations.

## 2023-06-14 NOTE — PLAN OF CARE
Pertinent assessments: Pt was admitted alert but with slurred speech and word-finding difficulties which improved over the night. Pt A&Ox4. VSS, on RA. Up Ax1 with belt. LS clear, BS active. Tylenol given x1 for headache. Slept well.  Major Shift Events none  Treatment Plan: NPO, neuro checks q2, ABX, speech consult today.   Bedside Nurse: Crista Brown RN

## 2023-06-15 ENCOUNTER — APPOINTMENT (OUTPATIENT)
Dept: SPEECH THERAPY | Facility: CLINIC | Age: 58
End: 2023-06-15
Payer: COMMERCIAL

## 2023-06-15 LAB — POTASSIUM SERPL-SCNC: 4 MMOL/L (ref 3.4–5.3)

## 2023-06-15 PROCEDURE — 250N000011 HC RX IP 250 OP 636: Performed by: PHYSICIAN ASSISTANT

## 2023-06-15 PROCEDURE — 92526 ORAL FUNCTION THERAPY: CPT | Mod: GN

## 2023-06-15 PROCEDURE — 99233 SBSQ HOSP IP/OBS HIGH 50: CPT | Performed by: HOSPITALIST

## 2023-06-15 PROCEDURE — 250N000013 HC RX MED GY IP 250 OP 250 PS 637: Performed by: HOSPITALIST

## 2023-06-15 PROCEDURE — 92522 EVALUATE SPEECH PRODUCTION: CPT | Mod: GN

## 2023-06-15 PROCEDURE — 250N000013 HC RX MED GY IP 250 OP 250 PS 637: Performed by: PHYSICIAN ASSISTANT

## 2023-06-15 PROCEDURE — 84132 ASSAY OF SERUM POTASSIUM: CPT | Performed by: HOSPITALIST

## 2023-06-15 PROCEDURE — 36415 COLL VENOUS BLD VENIPUNCTURE: CPT | Performed by: HOSPITALIST

## 2023-06-15 PROCEDURE — 120N000001 HC R&B MED SURG/OB

## 2023-06-15 RX ORDER — HYDROXYZINE HYDROCHLORIDE 25 MG/1
25 TABLET, FILM COATED ORAL 3 TIMES DAILY PRN
Status: DISCONTINUED | OUTPATIENT
Start: 2023-06-15 | End: 2023-06-16 | Stop reason: HOSPADM

## 2023-06-15 RX ORDER — NALOXONE HYDROCHLORIDE 0.4 MG/ML
0.4 INJECTION, SOLUTION INTRAMUSCULAR; INTRAVENOUS; SUBCUTANEOUS
Status: DISCONTINUED | OUTPATIENT
Start: 2023-06-15 | End: 2023-06-16 | Stop reason: HOSPADM

## 2023-06-15 RX ORDER — HYDROCODONE BITARTRATE AND ACETAMINOPHEN 10; 325 MG/1; MG/1
1 TABLET ORAL 4 TIMES DAILY
Status: DISCONTINUED | OUTPATIENT
Start: 2023-06-15 | End: 2023-06-16 | Stop reason: HOSPADM

## 2023-06-15 RX ORDER — NALOXONE HYDROCHLORIDE 0.4 MG/ML
0.2 INJECTION, SOLUTION INTRAMUSCULAR; INTRAVENOUS; SUBCUTANEOUS
Status: DISCONTINUED | OUTPATIENT
Start: 2023-06-15 | End: 2023-06-16 | Stop reason: HOSPADM

## 2023-06-15 RX ADMIN — Medication 1 MG: at 23:33

## 2023-06-15 RX ADMIN — HYDROCODONE BITARTRATE AND ACETAMINOPHEN 1 TABLET: 10; 325 TABLET ORAL at 21:28

## 2023-06-15 RX ADMIN — ASPIRIN 325 MG: 325 TABLET, COATED ORAL at 09:14

## 2023-06-15 RX ADMIN — AMPICILLIN SODIUM, SULBACTAM SODIUM 3 G: 2; 1 INJECTION, POWDER, FOR SOLUTION INTRAMUSCULAR; INTRAVENOUS at 02:26

## 2023-06-15 RX ADMIN — TERAZOSIN HYDROCHLORIDE 10 MG: 5 CAPSULE ORAL at 09:14

## 2023-06-15 RX ADMIN — CLOPIDOGREL BISULFATE 75 MG: 75 TABLET ORAL at 09:14

## 2023-06-15 RX ADMIN — DULOXETINE HYDROCHLORIDE 60 MG: 60 CAPSULE, DELAYED RELEASE ORAL at 09:14

## 2023-06-15 RX ADMIN — SENNOSIDES AND DOCUSATE SODIUM 1 TABLET: 50; 8.6 TABLET ORAL at 09:14

## 2023-06-15 RX ADMIN — AMPICILLIN SODIUM, SULBACTAM SODIUM 3 G: 2; 1 INJECTION, POWDER, FOR SOLUTION INTRAMUSCULAR; INTRAVENOUS at 09:13

## 2023-06-15 RX ADMIN — HYDROXYZINE HYDROCHLORIDE 25 MG: 25 TABLET, FILM COATED ORAL at 14:01

## 2023-06-15 RX ADMIN — NICOTINE 1 PATCH: 21 PATCH, EXTENDED RELEASE TRANSDERMAL at 09:14

## 2023-06-15 RX ADMIN — QUETIAPINE FUMARATE 100 MG: 50 TABLET ORAL at 21:28

## 2023-06-15 RX ADMIN — ENOXAPARIN SODIUM 40 MG: 40 INJECTION SUBCUTANEOUS at 20:15

## 2023-06-15 RX ADMIN — HYDROCODONE BITARTRATE AND ACETAMINOPHEN 1 TABLET: 10; 325 TABLET ORAL at 18:37

## 2023-06-15 RX ADMIN — SENNOSIDES AND DOCUSATE SODIUM 1 TABLET: 50; 8.6 TABLET ORAL at 20:16

## 2023-06-15 RX ADMIN — HYDROCODONE BITARTRATE AND ACETAMINOPHEN 1 TABLET: 10; 325 TABLET ORAL at 15:25

## 2023-06-15 RX ADMIN — ACETAMINOPHEN 650 MG: 325 TABLET, FILM COATED ORAL at 14:01

## 2023-06-15 RX ADMIN — AMPICILLIN SODIUM, SULBACTAM SODIUM 3 G: 2; 1 INJECTION, POWDER, FOR SOLUTION INTRAMUSCULAR; INTRAVENOUS at 20:15

## 2023-06-15 RX ADMIN — ATORVASTATIN CALCIUM 80 MG: 40 TABLET, FILM COATED ORAL at 20:15

## 2023-06-15 RX ADMIN — AMPICILLIN SODIUM, SULBACTAM SODIUM 3 G: 2; 1 INJECTION, POWDER, FOR SOLUTION INTRAMUSCULAR; INTRAVENOUS at 14:01

## 2023-06-15 ASSESSMENT — ACTIVITIES OF DAILY LIVING (ADL)
ADLS_ACUITY_SCORE: 28
ADLS_ACUITY_SCORE: 31
ADLS_ACUITY_SCORE: 28
ADLS_ACUITY_SCORE: 28
ADLS_ACUITY_SCORE: 31
ADLS_ACUITY_SCORE: 28

## 2023-06-15 NOTE — PLAN OF CARE
Goal Outcome Evaluation:    Major Shift Events: A&O, cont to have mild word finding difficulty. Up ind in room, showered self, ate well. Still receiving Unasyn for poss pna. Speech following, no new recommendations. C/o back pain, received Tylenol, Atarax and scheduled Norco.     Treatment Plan: Pending discharge home

## 2023-06-15 NOTE — CONSULTS
Care Management Discharge Note    Discharge Date: 06/15/2023       Discharge Disposition:  Home    Discharge Services:      Discharge DME:      Discharge Transportation:      Private pay costs discussed: Not applicable    Does the patient's insurance plan have a 3 day qualifying hospital stay waiver?  Yes   Will the waiver be used for post-acute placement? No      Handoff Referral Completed: Yes    Additional Information:  Case management is aware of the consult for discharge planning. Currently chart review and therapy notes do not indicate any discharge needs. Therapy is recommending home with assist and outpatient therapies.  Will clear consult at this time. Please re-consult if any discharge needs arise.    Alexsandra Hubbard RN BSN OCN  Care Coordinator  [facility]  [phone]

## 2023-06-15 NOTE — PROGRESS NOTES
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  CT of the head showed evidence of early infarct in the left MCA distribution involving the left insula without hemorrhage.  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 MRI brain confirmed acute/subacute left MCA territory ischemic injury without hemorrhagic conversion. Seen by Stroke Neurology and did not recommend any surgical intervention due to chronic L CCA/ICA occlusion. Recommended dual anti-plt therapy for 90 days and started statin for further risk stratification.   He comes back to ED today due to acute episode of recurrent left facial droop, worsening aphasia and RUE weakness around 9:00am. Also was reported to be hypoxic in the 80's and hypotensive with SBP in the 60-70's when evaluated by the EMS.      Since arrival to the ED, his BP improved with 1L NS, to SBP in the 150's. He was able to weaned off O2. CT chest shows no definitive PE but a small filling defect in the right lower lobe segmental pulm arteries. There's also right sided prominence with area of bronchiolar mucoid impaction suggestive of infection. Pt does have normal WBC, normal pro calcitonin and lactic acid though he did continue to sound ronchus with mucous and had obvious dysarthria concerning for possible aspiration. Pt has been started on Unasyn.   Stroke Neuro was called again and recommended repeat MRI of the brain.    He will be  "admitted to the hospital for definitive cares .     #Acute episode of hypoxia - now resolved, Suspect less likely PE but possible aspiration PNA   Unclear cause of transient hypoxia, but has been weaned off O2 and now 96% on RA. He denies CP, SOB, no pleuritic CP. I think PE is somewhat less likely given negative doppler US DVT and not hypoxic but did have recent hospitalization and hx of colon ca.   There is e/o mucoid impaction on RLL on CT and wife describes pt sounding \"gurgly\" in the last few days and now with apparent dysarthria and difficulty producing a cough I'm more inclined to think it might be more aspiration.   Plan:  -  Cont Unasyn for possible aspiration PNA   -  Diet order by speech therapy  -  Cont to monitor, PRN O2 if needed   -  Duonebs QID      #Episode of hypotension - resolved. Do not suspect sepsis given normal LA.   Was hypotension in the 70's with EMS but 100's in ED.   S/p 1L bolus with improved BP in the 140's.   -  Cont to monitor         #Recurrent right facial droop, dysarthria in the setting of recent dx of Left MCA CVA  Likely recrudescence of sxs due to acute illness rather than new CVA  -  repeat MRI today shows more \"confluence\" of her recent infarct of the anterior left MCA but no new infarct or hemorrhagic transformation.   -  On-going right facial droop and dysarthria  -  IVF hydration  -  Cont  and Plavix   -  On empiric Lovenox dosing for DVT prophylaxis   -  Stroke Neuro consulted      # On-going tobacco abuse   -  Smoking cessation counseled   -  Cont nicotine patch     #HLP   -  resume statin     #Anxiety  - Resume Cymbalta, atarax and seroquel for sleep    DVT Prophylaxis: Enoxaparin (Lovenox) SQ  Code Status: Full Code    Disposition: Expected discharge in next 2 days.    Interval History   Improving, denies shortness of breath.  No fever.  Neurological symptoms appear to be resolved.   Speech therapy is following    -Data reviewed today: I reviewed all new labs " and imaging results over the last 24 hours.     Physical Exam   Temp: 97.5  F (36.4  C) Temp src: Oral BP: (Abnormal) 153/82 Pulse: 83   Resp: 16 SpO2: 96 % O2 Device: None (Room air)    Vitals:    06/13/23 1819 06/14/23 0632 06/15/23 0543   Weight: 87.2 kg (192 lb 3.9 oz) 87.5 kg (192 lb 14.4 oz) 86.5 kg (190 lb 11.2 oz)     Vital Signs with Ranges  Temp:  [97.5  F (36.4  C)-98.4  F (36.9  C)] 97.5  F (36.4  C)  Pulse:  [] 83  Resp:  [16-28] 16  BP: (140-155)/(66-82) 153/82  SpO2:  [93 %-96 %] 96 %  No intake/output data recorded.    GEN:  Alert, oriented x 3, appears comfortable, NAD.  HEENT:  Normocephalic/atraumatic, no scleral icterus, no nasal discharge, mouth moist.  CV:  Regular rate and rhythm, no murmur or JVD.  S1 + S2 noted, no S3 or S4.  LUNGS:  Clear to auscultation bilaterally without rales/rhonchi/wheezing/retractions.  Symmetric chest rise on inhalation noted.  ABD:  Active bowel sounds, soft, non-tender/non-distended.  No rebound/guarding/rigidity.  EXT:  No edema or cyanosis.  No joint synovitis noted.  SKIN:  Dry to touch, no exanthems noted in the visualized areas.       Medications       ampicillin-sulbactam  3 g Intravenous Q6H     aspirin  325 mg Oral Daily     atorvastatin  80 mg Oral QPM     clopidogrel  75 mg Oral or NG Tube Daily     DULoxetine  60 mg Oral Daily     enoxaparin ANTICOAGULANT  40 mg Subcutaneous Q24H     nicotine  1 patch Transdermal Daily     nicotine   Transdermal Q8H     QUEtiapine   mg Oral At Bedtime     senna-docusate  1 tablet Oral BID    Or     senna-docusate  2 tablet Oral BID     terazosin  10 mg Oral Daily       Data   Recent Labs   Lab 06/15/23  0735 06/14/23  0603 06/13/23  1027 06/13/23  1024 06/12/23  0610 06/11/23  2228 06/11/23  1220   WBC  --  6.4 6.3  --  6.8  --  6.4   HGB  --  13.2* 14.6  14.6  --  13.3  --  14.1   MCV  --  92 92  --  94  --  93   PLT  --  179 178  --  177  --  179   INR  --   --  0.96  --   --   --  0.91   NA  --  137 139   139  --  138  --  137   POTASSIUM 4.0 4.1 3.9  3.9  --  4.2  --  3.7   CHLORIDE  --  104 101  104  --  105  --  100   CO2  --  24 25  --  26  --  26   BUN  --  14.3 14  13.2  --  19.3  --  16.7   CR  --  0.72 0.81  0.7  --  0.80  --  0.81   ANIONGAP  --  9 10  --  7  --  11   DUSTIN  --  8.6 9.1  --  8.5*  --  9.0   GLC  --  96 141*  141*   < > 126*   < > 104*    < > = values in this interval not displayed.       No results found for this or any previous visit (from the past 24 hour(s)).      Securely message with the Vocera Web Console (learn more here)  Text page via [facility] Paging/Directory        Disclaimer: This note consists of symbols derived from keyboarding, dictation and/or voice recognition software. As a result, there may be errors in the script that have gone undetected. Please consider this when interpreting information found in this chart.

## 2023-06-15 NOTE — PLAN OF CARE
End of Shift Summary  For vital signs and complete assessments, please see documentation flowsheets.     Pertinent assessments: A&Ox4, forgetful at times. Some word finding difficulty, other neuros intact. Denies pain. VSS on room air, afebrile. Tolerating regular diet. Up independently.     Major Shift Events: none.    Treatment Plan: IV Unasyn, Neuro following, encourage PO intake, and encourage activity.    Bedside Nurse: Bibiana Vargas RN

## 2023-06-15 NOTE — PLAN OF CARE
Pertinent assessments: VSS on room air. Afebrile. A&Ox4. Word finding difficulty. Forgetful at times. Up independently in the room. Saline locked PIV. Tolerating a regular diet with thin liquids.     Major Shift Events: none.    Treatment Plan: IV Unasyn, Neuro following, encourage PO intake, and encourage activity.    Bedside Nurse: Diego Blankenship RN

## 2023-06-15 NOTE — PLAN OF CARE
Occupational Therapy Discharge Summary    Reason for therapy discharge:    Pt/spouse decline the need for further IP OT . Most appropriate to defer further OT tasks to OP setting.     Progress towards therapy goal(s). See goals on Care Plan in [facility] electronic health record for goal details.  Goals partially met.  Barriers to achieving goals:   pt/spouse request discontinuation while IP, agreeable to OP OT.    Therapy recommendation(s):    Continued therapy is recommended.  Rationale/Recommendations:  OP OT for more in-depth IADL/cognitive assessment. Recommend increased assist for higher level IADLs (med mgmt, driving, etc) upon return home. Spouse agreeable to this level of support.

## 2023-06-15 NOTE — PROGRESS NOTES
"   06/15/23 1100   Appointment Info   Signing Clinician's Name / Credentials (SLP) Krystle Palomino M.A. CCC-SLP   General Information   Onset of Illness/Injury or Date of Surgery 06/11/23   Referring Physician Morro Mesa MD   Patient/Family Therapy Goal Statement (SLP) Pt wants to improve his speech.   Pertinent History of Current Problem \"Recurrent right facial droop, dysarthria in the setting of recent dx of Left MCA CVA.\" SLP consult for dysphagia and aphasia placed as part of stroke workup.   General Observations alert, cooperative, motivated   Type of Evaluation   Type of Evaluation Speech, Language, Cognition   Western Aphasia Battery- Revised Bedside Record From   Spontaneous Speech Content Score (out of 10) 5   Spontaneous Speech Fluency Score (out of 10) 6   Auditory Verbal Comprehension Score (out of 10) 9   Sequential Commands Score (out of 10) 6   Repetition Score (out of 10) 8   Object Naming Score (out of 10) 8   Bedside Aphasia Sum 42   WAB-R Bedside Aphasia Score 70   Writing Score (out of 10) 4   Bedside Aphasia Classification Broca's Aphasia   Aphasia Severity Level Moderate Aphasia   General Therapy Interventions   Planned Therapy Interventions Communication;Dysphagia Treatment   Clinical Impression   Criteria for Skilled Therapeutic Interventions Met (SLP Eval) Yes, treatment indicated   SLP Diagnosis mild to moderate oropharyngeal dysphagia, broca's aphasia   Risks & Benefits of therapy have been explained evaluation/treatment results reviewed;care plan/treatment goals reviewed;current/potential barriers reviewed;participants voiced agreement with care plan;participants included;patient   Clinical Impression Comments Western Aphasia Battery - WAB R completed this AM. Pt scored with moderate deficits, more severe with expressive language. Receptive language skills were good for simple tasks, with accuracy breaking down with increased complexity. SLP will follow this hospitalization for " both dysphagia and aphasia intervention. Recommend ongoing SLP intervention after hospital discharge, anticipate as an OP.   SLP Total Evaluation Time   Eval: Sound production Minutes (artic, phonology, apraxia, dysarthria) (88000) 20   SLP Goals   Therapy Frequency (SLP Eval) daily   SLP Predicted Duration/Target Date for Goal Attainment 06/28/23   SLP Goals Swallow;Communication   SLP: Safely tolerate diet without signs/symptoms of aspiration Regular diet;Thin liquids;With use of compensatory swallow strategies;Independently   SLP: Communicate basic wants and needs using gestures;verbally;minimal assist   SLP: Goal 1 The patient will complete pharyngeal strength ex x10 repetitions with 90% accuracy and independence.   SLP: Goal 2 The patient will successfully complete basic-mod level expressive language tasks with 90% accuracy and min cues.   Interventions   Interventions Quick Adds Swallowing Dysfunction;Speech, Language, Voice & Communication   Swallowing Dysfunction &/or Oral Function for Feeding   Treatment of Swallowing Dysfunction &/or Oral Function for Feeding Minutes (54063) 18   Treatment Detail/Skilled Intervention Pt recalled rec for no straw, but did have a straw in his bevarage that was brought in by family from [facility]. Pt verbalized knowing not to use it. Pt drank thin by cup with min-mod cue for good accuracy with chin tuck. x1 throat clear, but no other overt signs or symptoms of aspiration.   SLP Discharge Planning   SLP Plan diet tolerance, pharyngeal strength ex, strategy training, aphasia tx   SLP Discharge Recommendation home with outpatient speech therapy  (Pt is a good candidate for intensive rehab from SLP perspective.)   SLP Rationale for DC Rec SLP will follow for dysphagia tx for safe swallow strategy training and pharyngeal strength ex, as well as for mod-severe expressive aphasia, mild-mod receptive aphasia.   SLP Brief overview of current status  Recommend the patient continue a  regular texture diet and thin liquids with careful safety precautions (no straws, one small sip at a time with a chin tuck strategy, sit fully upright, avoid large consecutive swallows).   Total Session Time   Total Session Time (sum of timed and untimed services) 40

## 2023-06-16 ENCOUNTER — APPOINTMENT (OUTPATIENT)
Dept: EDUCATION SERVICES | Facility: CLINIC | Age: 58
End: 2023-06-16
Payer: COMMERCIAL

## 2023-06-16 VITALS
BODY MASS INDEX: 28.29 KG/M2 | DIASTOLIC BLOOD PRESSURE: 78 MMHG | RESPIRATION RATE: 18 BRPM | WEIGHT: 191 LBS | HEIGHT: 69 IN | SYSTOLIC BLOOD PRESSURE: 121 MMHG | HEART RATE: 79 BPM | OXYGEN SATURATION: 95 % | TEMPERATURE: 97.8 F

## 2023-06-16 LAB
BACTERIA SPT CULT: NORMAL
CREAT SERPL-MCNC: 0.82 MG/DL (ref 0.67–1.17)
GFR SERPL CREATININE-BSD FRML MDRD: >90 ML/MIN/1.73M2
GRAM STAIN RESULT: NORMAL
PLATELET # BLD AUTO: 178 10E3/UL (ref 150–450)
POTASSIUM SERPL-SCNC: 3.8 MMOL/L (ref 3.4–5.3)

## 2023-06-16 PROCEDURE — 250N000013 HC RX MED GY IP 250 OP 250 PS 637: Performed by: PHYSICIAN ASSISTANT

## 2023-06-16 PROCEDURE — 250N000011 HC RX IP 250 OP 636: Performed by: PHYSICIAN ASSISTANT

## 2023-06-16 PROCEDURE — 84132 ASSAY OF SERUM POTASSIUM: CPT | Performed by: HOSPITALIST

## 2023-06-16 PROCEDURE — 36415 COLL VENOUS BLD VENIPUNCTURE: CPT | Performed by: PHYSICIAN ASSISTANT

## 2023-06-16 PROCEDURE — 85049 AUTOMATED PLATELET COUNT: CPT | Performed by: PHYSICIAN ASSISTANT

## 2023-06-16 PROCEDURE — 99239 HOSP IP/OBS DSCHRG MGMT >30: CPT | Performed by: HOSPITALIST

## 2023-06-16 PROCEDURE — 250N000013 HC RX MED GY IP 250 OP 250 PS 637: Performed by: HOSPITALIST

## 2023-06-16 PROCEDURE — 82565 ASSAY OF CREATININE: CPT | Performed by: PHYSICIAN ASSISTANT

## 2023-06-16 RX ADMIN — AMPICILLIN SODIUM, SULBACTAM SODIUM 3 G: 2; 1 INJECTION, POWDER, FOR SOLUTION INTRAMUSCULAR; INTRAVENOUS at 08:18

## 2023-06-16 RX ADMIN — TERAZOSIN HYDROCHLORIDE 10 MG: 5 CAPSULE ORAL at 08:14

## 2023-06-16 RX ADMIN — DULOXETINE HYDROCHLORIDE 60 MG: 60 CAPSULE, DELAYED RELEASE ORAL at 08:14

## 2023-06-16 RX ADMIN — HYDROCODONE BITARTRATE AND ACETAMINOPHEN 1 TABLET: 10; 325 TABLET ORAL at 08:14

## 2023-06-16 RX ADMIN — ASPIRIN 325 MG: 325 TABLET, COATED ORAL at 08:15

## 2023-06-16 RX ADMIN — HYDROCODONE BITARTRATE AND ACETAMINOPHEN 1 TABLET: 10; 325 TABLET ORAL at 13:26

## 2023-06-16 RX ADMIN — AMPICILLIN SODIUM, SULBACTAM SODIUM 3 G: 2; 1 INJECTION, POWDER, FOR SOLUTION INTRAMUSCULAR; INTRAVENOUS at 13:27

## 2023-06-16 RX ADMIN — NICOTINE 1 PATCH: 21 PATCH, EXTENDED RELEASE TRANSDERMAL at 08:19

## 2023-06-16 RX ADMIN — AMPICILLIN SODIUM, SULBACTAM SODIUM 3 G: 2; 1 INJECTION, POWDER, FOR SOLUTION INTRAMUSCULAR; INTRAVENOUS at 01:49

## 2023-06-16 RX ADMIN — CLOPIDOGREL BISULFATE 75 MG: 75 TABLET ORAL at 08:15

## 2023-06-16 ASSESSMENT — ACTIVITIES OF DAILY LIVING (ADL)
ADLS_ACUITY_SCORE: 28

## 2023-06-16 NOTE — PLAN OF CARE
Goal Outcome Evaluation:      Plan of Care Reviewed With: patient      End of Shift Summary  For vital signs and complete assessments, please see documentation flowsheets.    Pertinent assessments: pt A&O, VSS, on room air. Denies  nausea, complained of pain gave schedule Norco  Continues to have some difficulty with word finding. Speech therapy  worked with him today,  . No noted weakness/deficits to upper or lower extremities. No  BM  this shift.    Major Shift Events :None  Treatment Plan: IVF Abx, neuro checks, encourage po intake

## 2023-06-16 NOTE — PLAN OF CARE
End of Shift Summary  For vital signs and complete assessments, please see documentation flowsheets.     Pertinent assessments: VSS, on room air. Denies pain. Continues to have some difficulty with word finding. A&O. No noted weakness/deficits to upper or lower extremities.      Treatment Plan: IVF Abx, neuro, encourage po intake

## 2023-06-16 NOTE — CONSULTS
06/16/23 1501 Veronika Garcia, ARNIE     Stroke Education Note     The following information has been reviewed with the patient and family:     1. Warning signs of stroke     2. Calling 911 if having warning signs of stroke     3. All modifiable risk factors: hypertension, CAD, atrial fib, diabetes, hypercholesterolemia, smoking, substance abuse, diet, physical inactivity, obesity, sleep apnea.     4. Patient's risk factors for stroke which include: Previous stroke, HLD, Diet, smoking, sleep apnea, physical inactivity     5. Follow-up plan for after discharge     6. Discharge medications which include: STATIN, ASA, PLAVIX     In addition, the above information was given to the patient and family in writing as a part of the [facility] Stroke Class Handout.     Learner's response to risk factors / lifestyle modification education: Ability to change Reasons to change Need to change      Veronika Garcia RN,

## 2023-06-16 NOTE — PLAN OF CARE
"Speech Language Therapy Discharge Summary    Reason for therapy discharge:    Discharged to home with outpatient therapy.    Progress towards therapy goal(s). See goals on Care Plan in Epic electronic health record for goal details.  Goals not met.  Barriers to achieving goals:   discharge from facility.    Therapy recommendation(s):    Continued therapy is recommended.  Rationale/Recommendations:  Recommend the patient continue a regular texture diet and thin liquids with careful safety precautions (no straws, one small sip at a time with a chin tuck strategy, sit fully upright, avoid large consecutive swallows). Recommend ongoing SLP services for dysphagia, including pharyngeal exercise program and speech/language tx. Per speech/language evaluation yesterday: \"Western Aphasia Battery - WAB R completed this AM. Pt scored with moderate deficits, more severe with expressive language. Receptive language skills were good for simple tasks, with accuracy breaking down with increased complexity. SLP will follow this hospitalization for both dysphagia and aphasia intervention. Recommend ongoing SLP intervention after hospital discharge, anticipate as an OP.\".    "

## 2023-06-16 NOTE — DISCHARGE SUMMARY
[facility]    Discharge Summary  Hospitalist    Date of Admission:  6/13/2023  Date of Discharge:  6/16/2023  Provider:  Morro Mesa MD  Date of Service (when I last saw the patient): 06/16/23    Discharge Diagnoses     Suspect aspiration PNA   Acute episode of hypoxia secondary to 1    Transient episode of hypotension secondary to 1&2   Recurrent right facial droop, dysarthria in the setting of recent dx of Left MCA CVA   Likely recrudescence of sxs due to acute illness rather than new CVA   On-going tobacco abuse    HLP    Anxiety     Other medical issues:  Past Medical History:   Diagnosis Date     Herniated lumbar disc without myelopathy        History of Present Illness   Dinesh Farfan is an 57 year old male who presented with recurrent facial drop .  Please see the admission history and physical for full details.    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  CT of the head showed evidence of early infarct in the left MCA distribution involving the left insula without hemorrhage.  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 MRI brain confirmed acute/subacute left MCA territory ischemic injury without hemorrhagic conversion. Seen by Stroke Neurology and did not recommend any surgical intervention due to chronic L CCA/ICA occlusion. Recommended dual anti-plt therapy for 90 days and started statin for further risk stratification.   He comes back to ED today due to acute episode of recurrent left facial droop, worsening aphasia and RUE weakness around 9:00am. Also was reported to be hypoxic in the  "80's and hypotensive with SBP in the 60-70's when evaluated by the EMS.      Since arrival to the ED, his BP improved with 1L NS, to SBP in the 150's. He was able to weaned off O2. CT chest shows no definitive PE but a small filling defect in the right lower lobe segmental pulm arteries. There's also right sided prominence with area of bronchiolar mucoid impaction suggestive of infection. Pt does have normal WBC, normal pro calcitonin and lactic acid though he did continue to sound ronchus with mucous and had obvious dysarthria concerning for possible aspiration. Pt has been started on Unasyn.   Stroke Neuro was called again and recommended repeat MRI of the brain.    He will be admitted to the hospital for definitive cares .     #Acute episode of hypoxia - now resolved, Suspect aspiration PNA   Unclear cause of transient hypoxia, but has been weaned off O2 and now 96% on RA. He denies CP, SOB, no pleuritic CP. I think PE is somewhat less likely given negative doppler US DVT and not hypoxic but did have recent hospitalization and hx of colon ca.   There is e/o mucoid impaction on RLL on CT and wife describes pt sounding \"gurgly\" in the last few days and now with apparent dysarthria and difficulty producing a cough I'm more inclined to think it might be more aspiration.   Plan:  -  Cont Unasyn for possible aspiration PNA   -  Diet order by speech therapy  -  Cont to monitor, PRN O2 if needed   -  Duonebs QID      #Episode of hypotension - resolved. Do not suspect sepsis given normal LA.   Was hypotension in the 70's with EMS but 100's in ED.   S/p 1L bolus with improved BP in the 140's.   -  Cont to monitor         #Recurrent right facial droop, dysarthria in the setting of recent dx of Left MCA CVA  Likely recrudescence of sxs due to acute illness rather than new CVA  -  repeat MRI today shows more \"confluence\" of her recent infarct of the anterior left MCA but no new infarct or hemorrhagic " transformation.   -  On-going right facial droop and dysarthria  -  IVF hydration  -  Cont  and Plavix   -  On empiric Lovenox dosing for DVT prophylaxis   -  Stroke Neuro consulted      # On-going tobacco abuse   -  Smoking cessation counseled   -  Cont nicotine patch     #HLP   -  resume statin     #Anxiety  - Resume Cymbalta, atarax and seroquel for     # Discharge Pain Plan:    - Patient currently has NO PAIN and is not being prescribed pain medications on discharge.      Significant Results and Procedures   See below     Pending Results      Unresulted Labs Ordered in the Past 30 Days of this Admission     No orders found from 5/14/2023 to 6/14/2023.          Code Status   Full Code       Primary Care Physician   Gian Hoffman    GEN:  Alert, oriented x 3, appears comfortable, NAD.  HEENT:  Normocephalic/atraumatic, no scleral icterus, no nasal discharge, mouth moist.  CV:  Regular rate and rhythm, no murmur or JVD.  S1 + S2 noted, no S3 or S4.  LUNGS:  Clear to auscultation bilaterally without rales/rhonchi/wheezing/retractions.  Symmetric chest rise on inhalation noted.  ABD:  Active bowel sounds, soft, non-tender/non-distended.  No rebound/guarding/rigidity.  EXT:  No edema or cyanosis.  No joint synovitis noted.  SKIN:  Dry to touch, no exanthems noted in the visualized areas.     Discharge Disposition   Discharged to home    Consultations This Hospital Stay   CARE MANAGEMENT / SOCIAL WORK IP CONSULT  SPEECH LANGUAGE PATH ADULT IP CONSULT  PHYSICAL THERAPY ADULT IP CONSULT  OCCUPATIONAL THERAPY ADULT IP CONSULT  NEUROLOGY IP STROKE CONSULT    Time Spent on this Encounter   I, Morro Mesa MD, personally saw the patient today and spent greater than 30 minutes discharging this patient.     Discharge Orders     Discharge Medications   Current Discharge Medication List      START taking these medications    Details   amoxicillin-clavulanate (AUGMENTIN) 875-125 MG tablet Take 1 tablet by mouth 2 times  daily for 5 days  Qty: 10 tablet, Refills: 0    Associated Diagnoses: Pneumonia due to infectious organism, unspecified laterality, unspecified part of lung         CONTINUE these medications which have NOT CHANGED    Details   aspirin (ASA) 325 MG EC tablet Take 1 tablet (325 mg) by mouth daily  Qty: 90 tablet, Refills: 3    Associated Diagnoses: Cerebrovascular accident (CVA), unspecified mechanism (H)      clopidogrel (PLAVIX) 75 MG tablet 1 tablet (75 mg) by Oral or NG Tube route daily  Qty: 90 tablet, Refills: 0    Associated Diagnoses: Cerebrovascular accident (CVA) due to embolism of left middle cerebral artery (H)      DULoxetine (CYMBALTA) 60 MG capsule Take 60 mg by mouth daily      HYDROcodone-acetaminophen (NORCO)  MG per tablet Take 1 tablet by mouth 4 times daily      hydrOXYzine (ATARAX) 25 MG tablet Take 25 mg by mouth 3 times daily as needed for anxiety      ibuprofen (ADVIL/MOTRIN) 800 MG tablet Take 800 mg by mouth every 8 hours as needed for moderate pain      nicotine 21-14-7 MG/24HR KIT Place 1 patch onto the skin daily  Qty: 1 kit, Refills: 0    Associated Diagnoses: Cigarette nicotine dependence without complication      QUEtiapine (SEROQUEL) 50 MG tablet Take  mg by mouth At Bedtime      terazosin (HYTRIN) 10 MG capsule Take 10 mg by mouth daily      traZODone (DESYREL) 50 MG tablet Take  mg by mouth nightly as needed for sleep      atorvastatin (LIPITOR) 80 MG tablet Take 1 tablet (80 mg) by mouth every evening  Qty: 90 tablet, Refills: 0    Associated Diagnoses: Cerebrovascular accident (CVA) due to embolism of left middle cerebral artery (H)           Allergies   Allergies   Allergen Reactions     Morphine Unknown     Takes hydrocodone without problem.     Data   Most Recent 3 CBC's:  Recent Labs   Lab Test 06/16/23  0555 06/14/23  0603 06/13/23  1027 06/12/23  0610   WBC  --  6.4 6.3 6.8   HGB  --  13.2* 14.6  14.6 13.3   MCV  --  92 92 94    179 178 177       Most Recent 3 BMP's:  Recent Labs   Lab Test 06/16/23  0555 06/15/23  0735 06/14/23  0603 06/13/23  1027 06/13/23  1024 06/12/23  0610   NA  --   --  137 139  139  --  138   POTASSIUM 3.8 4.0 4.1 3.9  3.9  --  4.2   CHLORIDE  --   --  104 101  104  --  105   CO2  --   --  24 25  --  26   BUN  --   --  14.3 14  13.2  --  19.3   CR 0.82  --  0.72 0.81  0.7  --  0.80   ANIONGAP  --   --  9 10  --  7   DUSTIN  --   --  8.6 9.1  --  8.5*   GLC  --   --  96 141*  141*   < > 126*    < > = values in this interval not displayed.     Most Recent 2 LFT's:No lab results found.  Most Recent INR's and Anticoagulation Dosing History:  Anticoagulation Dose History        Row Labels Latest Ref Rng & Units 6/11/2023 6/13/2023   Recent Dosing and Labs   Section Header. No data exists in this row.      INR   0.85 - 1.15 0.91   0.96                Most Recent 3 Troponin's:No lab results found.  Most Recent Cholesterol Panel:  Recent Labs   Lab Test 06/11/23  1220   CHOL 168   LDL 92   HDL 44   TRIG 162*     Most Recent 6 Bacteria Isolates From Any Culture (See EPIC Reports for Culture Details):No lab results found.  Most Recent TSH, T4 and A1c Labs:  Recent Labs   Lab Test 06/11/23  1220   A1C 6.0*     Results for orders placed or performed during the hospital encounter of 06/13/23   CT Head w/o Contrast    Narrative    CT HEAD WITHOUT CONTRAST 6/13/2023 10:43 AM    INDICATION: Code Stroke to evaluate for potential thrombolysis and  thrombectomy.  Recent stroke. Worsening right-sided weakness with  aphasia.  TECHNIQUE: CT scan of the head without contrast. Dose reduction  techniques were used.  CONTRAST: None.  COMPARISON: Head and neck CTA and brain MRI 6/11/2023.    FINDINGS: Small recent subacute infarct is visible in the anterior  left insula. Several calcifications within and along the left middle  cerebral artery territory presumably reflecting prior emboli are  unchanged. Patchy hypodensity within the left frontal white  matter  (axial image 19) is new versus the prior CT most consistent with minor  interval progression of subacute ischemic injury. No hemorrhage. The  ventricles and sulci are normal for age. Osseous structures are  intact. Unremarkable orbits. Paranasal sinuses are free of significant  disease. Clear mastoid air cells.      Impression    IMPRESSION:  1.  Probably subtle progression of subacute ischemic injury in the  left middle cerebral artery territory affecting the upper left frontal  white matter.  2.  Subacute infarct affecting the anterior left insula unchanged.  3.  No hemorrhage.     PAULA RENE MD         SYSTEM ID:  [id number]   CTA Head Neck with Contrast    Narrative    CTA HEAD AND NECK WITH CONTRAST 6/13/2023 10:50 AM    INDICATION: Code stroke to evaluate for potential thrombolysis and  thrombectomy. Recent stroke. Worsening right-sided weakness with  aphasia.    TECHNIQUE: Head and neck CT angiogram with IV contrast. CT images of  the head and neck vessels obtained during the arterial phase of  intravenous contrast administration. Axial helical 2D reconstructed  images and multiplanar 3D MIP reconstructed images of the head and  neck vessels were performed on a separate workstation. Dose reduction  techniques were used.    CONTRAST: 75mL Isovue-370    COMPARISON: Head and neck CTA 6/11/2023.     FINDINGS:  HEAD CTA: The intracranial segment of the left internal carotid artery  remains occluded until its supraclinoid level, possibly slightly  progressed. The proximal left middle cerebral artery fills throughout  its M1 segment. There is occlusion or at least diminished filling in  the proximal anterior division M2 branches versus the prior CTA. These  correspond to the area of infarct identified in the anterior insula.  Posterior left middle cerebral artery branches opacify with contrast.  No anterior, right middle or posterior cerebral artery occlusion.  Patent dural venous sinuses.    NECK  CTA: Three vessel arch. The left carotid artery is occluded  proximally. Reconstitution of external carotid artery branches  presumably via collateral filling. The common and internal segments  remain occluded. Mild atherosclerosis of the right carotid artery  without narrowing by NASCET criteria. Moderate to severe narrowing at  the origin of the right vertebral artery. Patent left vertebral  artery. No dissection. Overall no significant interval change.      Impression    IMPRESSION:  HEAD CTA:  1.  Decreased filling of anterior division M2 middle cerebral artery  branches on the left versus 6/11/2023 consistent with vessel  occlusion. This generally corresponds to the area of infarct  identified on CT and MRI.  2.  The more proximal M1 segment of the left middle cerebral artery  and posterior division M2 branches appear patent.  3.  The proximal intracranial segment of the left internal carotid  artery remains occluded until the level of the clinoid process,  possibly slightly progressed.    NECK CTA:  1.  Unchanged occlusion of the left common and internal carotid  arteries. Filling of the external branches is presumably via  collaterals.  2.  Mild right carotid artery atherosclerosis without significant  narrowing.  3.  Moderate to severe narrowing at the origin of the right vertebral  artery unchanged. No dissection.    Findings were discussed with Dr. IRENE SANTOS via telephone  at 1102 hours on 6/13/2023.    PAULA RENE MD         SYSTEM ID:  [id number]   CT Chest Pulmonary Embolism w Contrast    Narrative    CT CHEST PULMONARY EMBOLISM WITH CONTRAST  6/13/2023 10:53 AM    HISTORY: Chest pain, evaluate PE, Dyspnea, hypotensive; Male sex; No  imaging to rule out PE in the last 24 hours; Pulmonary embolism  rule-out criteria (PERC) score > 0; Revised [place] Score (RGS) not >=  11; No D-dimer result available; D-dimer not ordered.    TECHNIQUE: Scans obtained from the apices through the  diaphragm with  IV contrast. 75mL Isovue-370 IV injected. Radiation dose for this scan  was reduced using automated exposure control, adjustment of the mA  and/or kV according to patient size, or iterative reconstruction  technique. 2D and 3D MIP reconstructions were performed by the CT  technologist    COMPARISON:  None available.    FINDINGS:  Chest/mediastinum: Small apparent filling defect within a right lower  lobe segmental pulmonary artery (series 6 image 190), likely  artifactual related to motion/respiratory artifact No cardiomegaly or  significant pericardial effusion. No significant mediastinal or hilar  lymphadenopathy. Mild atherosclerotic vascular calcification of the  coronary arteries.    Lungs and pleura: No pleural effusion or pneumothorax. Basilar  predominant, right more than left areas of bronchiolar mucoid  impaction, with associated pulmonary opacities, likely infectious.    Upper abdomen: Limited evaluation of the upper abdomen due to lack of  coverage and timing of contrast.    Bones and soft tissue: No suspicious osseous lesion.      Impression    IMPRESSION:   1. Apparent small filling defect within one of the right lower lobe  segmental pulmonary arteries, likely artifactual related to  motion/respiratory artifact versus less likely small pulmonary  embolus. No evidence for right heart strain.  2. Basilar predominant right more than left, areas of bronchiolar  mucoid impaction, with associated pulmonary opacities, likely  infectious.    SILVINA BATISTA MD         SYSTEM ID:  [id number]   MR Brain w/o & w Contrast    Narrative    MR BRAIN WITHOUT AND WITH CONTRAST 6/13/2023 3:43 PM    INDICATION: Recurrence of stroke symptoms, rule out new infarcts.    TECHNIQUE: Noncontrast and contrast enhanced MRI of the brain.    CONTRAST: 9mL Gadavist    COMPARISON: Brain MRI 6/11/2023    FINDINGS:  The patchy areas of restricted diffusion in the anterior  left middle cerebral artery territory  affecting the frontal operculum  is slightly more confluent than 6/11/2023, but otherwise unchanged. No  new area of infarct. No MRI evidence of hemorrhage. Mild mucosal  thickening within the ethmoid air cells. Paranasal sinuses are  otherwise free from significant disease. Patchy fluid within the left  mastoid air cells. Right appear clear. Intraorbital contents are  unremarkable. Ventricles are within normal limits in size for the  patient's age. Hyperintense left internal carotid artery flow void  consistent with vessel occlusion. Left anterior middle cerebral artery  branches are hyperintense consistent with diminished or absent flow.  Appearance unchanged. There is no mass effect, midline shift, or  extraaxial collection.      Impression    IMPRESSION:   1.  Evolving areas of recent infarct in the anterior left middle  cerebral artery territory affecting the frontal lobe and insula. The  areas of ischemic tissue are slightly more confluent but overall  unchanged in size.  2.  No area of new infarct.  3.  No hemorrhage.    PAULA RENE MD         SYSTEM ID:  [id number]   US Lower Extremity Venous Duplex Bilateral    Narrative    US LOWER EXTREMITY VENOUS DUPLEX BILATERAL 6/13/2023 1:50 PM    CLINICAL HISTORY: Chest pain, ? pe on CT, eval DVT as source  TECHNIQUE: Venous Duplex ultrasound of bilateral lower extremities  with and without compression, augmentation and duplex. Color flow and  spectral Doppler with waveform analysis performed.    COMPARISON: Same-day chest CT    FINDINGS: Exam includes the common femoral, femoral, popliteal veins  as well as segmentally visualized deep calf veins and greater  saphenous vein.     RIGHT: No deep vein thrombosis. No superficial thrombophlebitis. No  popliteal cyst.    LEFT: No deep vein thrombosis. No superficial thrombophlebitis. No  popliteal cyst.      Impression    IMPRESSION:  1.  No deep venous thrombosis in the bilateral lower extremities.    PAMELA  MD YUNG         SYSTEM ID:  [id number]   XR Video Swallow with SLP or OT    Narrative    VIDEO SWALLOW WITH SLP OR OT June 14, 2023 10:58 AM     HISTORY: Aspiration risk assessment.    COMPARISON: None available.    FINDINGS: A swallow study was performed in coordination with a member  from the speech pathology service. Total fluoroscopy time was 1.3  minutes. Ten spot fluoroscopic images, and/or cine clips were  obtained. One view in lateral projection. Various consistencies of  barium were given to the patient to swallow, and the swallowing  mechanism was observed using fluoroscopy.    Occasional deep penetration and aspiration noted with thin barium. No  penetration or aspiration noted with other consistencies of barium.      Impression    IMPRESSION:  1. Occasional deep penetration or aspiration noted with thin barium.  No penetration or aspiration with other consistencies of barium.  2. Please refer to the speech pathology report for further details.    This study includes only the cervical esophagus.    SILVINA BATISTA MD         SYSTEM ID:  [id number]     Disclaimer: This note consists of symbols derived from keyboarding, dictation and/or voice recognition software. As a result, there may be errors in the script that have gone undetected. Please consider this when interpreting information found in this chart.

## 2023-06-16 NOTE — PLAN OF CARE
Reviewed discharge instructions with patient and spouse. Questions answered. Patient discharged to home with wife , discharge instructions, and belongings.

## 2023-06-16 NOTE — PLAN OF CARE
Goal Outcome Evaluation:       End of Shift Summary  For vital signs and complete assessments, please see documentation flowsheets.     Pertinent assessments: Pt A&OX4. Vss and on RA. LS clear and no sob noted. Having some word finding. Had a fair appetite. Had some pain and schd meds given for back pain. Neuros intact.    Major Shift Events :None    Treatment Plan: IVF Abx, neuro check, encourage po intake, SP following,

## 2023-06-19 ENCOUNTER — PATIENT OUTREACH (OUTPATIENT)
Dept: CARE COORDINATION | Facility: CLINIC | Age: 58
End: 2023-06-19
Payer: COMMERCIAL

## 2023-06-19 NOTE — PROGRESS NOTES
Connected Care Resource Center Contact  [facility]/Voicemail     Clinical Data: Transitional Care Management Outreach     Outreach attempted x 2.  Left message on patient's voicemail, providing [facility]'s 24/7 scheduling and nurse triage phone number [phone] ([phone]) for questions/concerns and/or to schedule an appt with an [facility] provider, if they do not have a PCP.      Plan:  [facility] will do no further outreaches at this time.       BOSSMAN Bhatt  Connected Care Resource [place], [facility]    *Connected Care Resource Team does NOT follow patient ongoing. Referrals are identified based on internal discharge reports and the outreach is to ensure patient has an understanding of their discharge instructions.

## 2023-08-25 NOTE — PLAN OF CARE
Problem: At Risk for Falls  Goal: # Patient does not fall  Outcome: Outcome Met, Continue evaluating goal progress toward completion     Problem: Pain  Goal: #Acceptable pain level achieved/maintained at rest using NRS/Faces  Description: This goal is used for patients who can self-report.  Acceptable means the level is at or below the identified comfort/function goal.  Outcome: Outcome Met, Continue evaluating goal progress toward completion     Problem: At Risk for Falls  Goal: # Takes action to control fall-related risks  Outcome: Outcome Not Met, Continue to Monitor  Goal: # Verbalizes understanding of fall risk/precautions  Description: Document education using the patient education activity  Outcome: Outcome Not Met, Continue to Monitor     Problem: Cardiac Surgery  Goal: Hemodynamic stability achieved/maintained  Description: Hemodynamic instability may include VS or rhythm changes or s/s FVE.  AHA guidelines: Keep BP>90 mm Hg.  Outcome: Outcome Not Met, Continue to Monitor  Goal: Activity level is maintained/returned to level needed for d/c  Outcome: Outcome Not Met, Continue to Monitor     Problem: Postoperative Care  Goal: Vital signs are maintained within parameters  Outcome: Outcome Not Met, Continue to Monitor  Goal: Elimination status is maintained/returned to baseline  Outcome: Outcome Not Met, Continue to Monitor  Goal: Oral intake is resumed and tolerated  Outcome: Outcome Not Met, Continue to Monitor      "Speech Language Therapy Discharge Summary    Reason for therapy discharge:    Discharged to home with outpatient therapy.    Progress towards therapy goal(s). See goals on Care Plan in [facility] electronic health record for goal details.  Goals not met.  Barriers to achieving goals:  discharge on same date as initial evaluation.    Therapy recommendation(s):    Continued therapy is recommended.  Rationale/Recommendations:  \"Recommend continue SLP services at outpatient setting with intermittent assistance at home needed due to moderate aphasia deficit impact to safety if he were home alone.\"     \"Clinical swallow and language assessments completed:  Patient with facial droop and tongue deviation impacting both swallowing safety and speech production.  Moderate expressive aphasia as well, though receptively functioning well.  Recommend regular diet with thin liquids when patient is alert and upright, food placement and chewing into Left sided mouth.\"    *Pt not seen by discharging therapist on this date, note written based on previous treating therapist's notes and recommendations           "

## 2023-11-11 ENCOUNTER — APPOINTMENT (OUTPATIENT)
Dept: GENERAL RADIOLOGY | Facility: CLINIC | Age: 58
End: 2023-11-11
Attending: EMERGENCY MEDICINE
Payer: COMMERCIAL

## 2023-11-11 ENCOUNTER — HOSPITAL ENCOUNTER (OUTPATIENT)
Facility: CLINIC | Age: 58
Setting detail: OBSERVATION
Discharge: HOME | End: 2023-11-13
Attending: EMERGENCY MEDICINE | Admitting: HOSPITALIST
Payer: COMMERCIAL

## 2023-11-11 DIAGNOSIS — R09.02 HYPOXIA: ICD-10-CM

## 2023-11-11 DIAGNOSIS — J44.9 CHRONIC OBSTRUCTIVE PULMONARY DISEASE, UNSPECIFIED COPD TYPE (H): ICD-10-CM

## 2023-11-11 DIAGNOSIS — J20.9 ACUTE BRONCHITIS, UNSPECIFIED ORGANISM: ICD-10-CM

## 2023-11-11 DIAGNOSIS — Z87.891 HISTORY OF TOBACCO ABUSE: ICD-10-CM

## 2023-11-11 DIAGNOSIS — J42 CHRONIC BRONCHITIS, UNSPECIFIED CHRONIC BRONCHITIS TYPE (H): Primary | ICD-10-CM

## 2023-11-11 LAB
ANION GAP SERPL CALCULATED.3IONS-SCNC: 9 MMOL/L (ref 7–15)
BASE EXCESS BLDV CALC-SCNC: 3.6 MMOL/L (ref -7.7–1.9)
BASOPHILS # BLD AUTO: 0 10E3/UL (ref 0–0.2)
BASOPHILS NFR BLD AUTO: 1 %
BUN SERPL-MCNC: 20.4 MG/DL (ref 6–20)
CALCIUM SERPL-MCNC: 9.4 MG/DL (ref 8.6–10)
CHLORIDE SERPL-SCNC: 102 MMOL/L (ref 98–107)
CREAT SERPL-MCNC: 0.87 MG/DL (ref 0.67–1.17)
DEPRECATED HCO3 PLAS-SCNC: 28 MMOL/L (ref 22–29)
EGFRCR SERPLBLD CKD-EPI 2021: >90 ML/MIN/1.73M2
EOSINOPHIL # BLD AUTO: 0.1 10E3/UL (ref 0–0.7)
EOSINOPHIL NFR BLD AUTO: 1 %
ERYTHROCYTE [DISTWIDTH] IN BLOOD BY AUTOMATED COUNT: 13.3 % (ref 10–15)
FLUAV RNA SPEC QL NAA+PROBE: NEGATIVE
FLUBV RNA RESP QL NAA+PROBE: NEGATIVE
GLUCOSE SERPL-MCNC: 103 MG/DL (ref 70–99)
HCO3 BLDV-SCNC: 30 MMOL/L (ref 21–28)
HCT VFR BLD AUTO: 42.4 % (ref 40–53)
HGB BLD-MCNC: 14.3 G/DL (ref 13.3–17.7)
IMM GRANULOCYTES # BLD: 0 10E3/UL
IMM GRANULOCYTES NFR BLD: 0 %
LYMPHOCYTES # BLD AUTO: 1.9 10E3/UL (ref 0.8–5.3)
LYMPHOCYTES NFR BLD AUTO: 34 %
MCH RBC QN AUTO: 31 PG (ref 26.5–33)
MCHC RBC AUTO-ENTMCNC: 33.7 G/DL (ref 31.5–36.5)
MCV RBC AUTO: 92 FL (ref 78–100)
MONOCYTES # BLD AUTO: 0.8 10E3/UL (ref 0–1.3)
MONOCYTES NFR BLD AUTO: 14 %
NEUTROPHILS # BLD AUTO: 2.7 10E3/UL (ref 1.6–8.3)
NEUTROPHILS NFR BLD AUTO: 50 %
NRBC # BLD AUTO: 0 10E3/UL
NRBC BLD AUTO-RTO: 0 /100
O2/TOTAL GAS SETTING VFR VENT: 0 %
PCO2 BLDV: 49 MM HG (ref 40–50)
PH BLDV: 7.39 [PH] (ref 7.32–7.43)
PLATELET # BLD AUTO: 194 10E3/UL (ref 150–450)
PO2 BLDV: 40 MM HG (ref 25–47)
POTASSIUM SERPL-SCNC: 4.2 MMOL/L (ref 3.4–5.3)
RBC # BLD AUTO: 4.61 10E6/UL (ref 4.4–5.9)
RSV RNA SPEC NAA+PROBE: NEGATIVE
SARS-COV-2 RNA RESP QL NAA+PROBE: NEGATIVE
SODIUM SERPL-SCNC: 139 MMOL/L (ref 135–145)
TROPONIN T SERPL HS-MCNC: 7 NG/L
WBC # BLD AUTO: 5.5 10E3/UL (ref 4–11)

## 2023-11-11 PROCEDURE — 71046 X-RAY EXAM CHEST 2 VIEWS: CPT

## 2023-11-11 PROCEDURE — 999N000157 HC STATISTIC RCP TIME EA 10 MIN

## 2023-11-11 PROCEDURE — 250N000011 HC RX IP 250 OP 636: Performed by: EMERGENCY MEDICINE

## 2023-11-11 PROCEDURE — 250N000009 HC RX 250: Performed by: HOSPITALIST

## 2023-11-11 PROCEDURE — 250N000009 HC RX 250: Performed by: EMERGENCY MEDICINE

## 2023-11-11 PROCEDURE — 85025 COMPLETE CBC W/AUTO DIFF WBC: CPT | Performed by: EMERGENCY MEDICINE

## 2023-11-11 PROCEDURE — 250N000013 HC RX MED GY IP 250 OP 250 PS 637: Performed by: STUDENT IN AN ORGANIZED HEALTH CARE EDUCATION/TRAINING PROGRAM

## 2023-11-11 PROCEDURE — G0378 HOSPITAL OBSERVATION PER HR: HCPCS

## 2023-11-11 PROCEDURE — 99285 EMERGENCY DEPT VISIT HI MDM: CPT | Mod: 25

## 2023-11-11 PROCEDURE — 250N000013 HC RX MED GY IP 250 OP 250 PS 637: Performed by: HOSPITALIST

## 2023-11-11 PROCEDURE — 36415 COLL VENOUS BLD VENIPUNCTURE: CPT | Performed by: EMERGENCY MEDICINE

## 2023-11-11 PROCEDURE — 93005 ELECTROCARDIOGRAM TRACING: CPT

## 2023-11-11 PROCEDURE — 94640 AIRWAY INHALATION TREATMENT: CPT

## 2023-11-11 PROCEDURE — 82803 BLOOD GASES ANY COMBINATION: CPT | Performed by: EMERGENCY MEDICINE

## 2023-11-11 PROCEDURE — 87637 SARSCOV2&INF A&B&RSV AMP PRB: CPT | Performed by: EMERGENCY MEDICINE

## 2023-11-11 PROCEDURE — 99222 1ST HOSP IP/OBS MODERATE 55: CPT | Performed by: HOSPITALIST

## 2023-11-11 PROCEDURE — 96374 THER/PROPH/DIAG INJ IV PUSH: CPT

## 2023-11-11 PROCEDURE — 80048 BASIC METABOLIC PNL TOTAL CA: CPT | Performed by: EMERGENCY MEDICINE

## 2023-11-11 PROCEDURE — 250N000013 HC RX MED GY IP 250 OP 250 PS 637: Performed by: EMERGENCY MEDICINE

## 2023-11-11 PROCEDURE — 999N000156 HC STATISTIC RCP CONSULT EA 30 MIN

## 2023-11-11 PROCEDURE — 84484 ASSAY OF TROPONIN QUANT: CPT | Performed by: EMERGENCY MEDICINE

## 2023-11-11 RX ORDER — AZITHROMYCIN 250 MG/1
250 TABLET, FILM COATED ORAL DAILY
Status: DISCONTINUED | OUTPATIENT
Start: 2023-11-12 | End: 2023-11-13 | Stop reason: HOSPADM

## 2023-11-11 RX ORDER — IPRATROPIUM BROMIDE AND ALBUTEROL SULFATE 2.5; .5 MG/3ML; MG/3ML
6 SOLUTION RESPIRATORY (INHALATION) ONCE
Status: COMPLETED | OUTPATIENT
Start: 2023-11-11 | End: 2023-11-11

## 2023-11-11 RX ORDER — LORAZEPAM 0.5 MG/1
0.5 TABLET ORAL EVERY 4 HOURS PRN
Status: DISCONTINUED | OUTPATIENT
Start: 2023-11-11 | End: 2023-11-12

## 2023-11-11 RX ORDER — ONDANSETRON 4 MG/1
4 TABLET, ORALLY DISINTEGRATING ORAL EVERY 6 HOURS PRN
Status: DISCONTINUED | OUTPATIENT
Start: 2023-11-11 | End: 2023-11-13 | Stop reason: HOSPADM

## 2023-11-11 RX ORDER — GUAIFENESIN 600 MG/1
1200 TABLET, EXTENDED RELEASE ORAL 2 TIMES DAILY
Status: DISCONTINUED | OUTPATIENT
Start: 2023-11-11 | End: 2023-11-13 | Stop reason: HOSPADM

## 2023-11-11 RX ORDER — POLYETHYLENE GLYCOL 3350 17 G
4 POWDER IN PACKET (EA) ORAL
Status: DISCONTINUED | OUTPATIENT
Start: 2023-11-11 | End: 2023-11-13 | Stop reason: HOSPADM

## 2023-11-11 RX ORDER — ACETAMINOPHEN 325 MG/1
650 TABLET ORAL EVERY 6 HOURS PRN
Status: DISCONTINUED | OUTPATIENT
Start: 2023-11-11 | End: 2023-11-13 | Stop reason: HOSPADM

## 2023-11-11 RX ORDER — HYDROXYZINE HYDROCHLORIDE 10 MG/1
10 TABLET, FILM COATED ORAL 3 TIMES DAILY PRN
Status: DISCONTINUED | OUTPATIENT
Start: 2023-11-11 | End: 2023-11-12

## 2023-11-11 RX ORDER — IPRATROPIUM BROMIDE AND ALBUTEROL SULFATE 2.5; .5 MG/3ML; MG/3ML
3 SOLUTION RESPIRATORY (INHALATION) ONCE
Status: COMPLETED | OUTPATIENT
Start: 2023-11-11 | End: 2023-11-11

## 2023-11-11 RX ORDER — ATORVASTATIN CALCIUM 20 MG/1
80 TABLET, FILM COATED ORAL EVERY EVENING
Status: DISCONTINUED | OUTPATIENT
Start: 2023-11-11 | End: 2023-11-13 | Stop reason: HOSPADM

## 2023-11-11 RX ORDER — ACETAMINOPHEN 650 MG/1
650 SUPPOSITORY RECTAL EVERY 6 HOURS PRN
Status: DISCONTINUED | OUTPATIENT
Start: 2023-11-11 | End: 2023-11-13 | Stop reason: HOSPADM

## 2023-11-11 RX ORDER — IPRATROPIUM BROMIDE AND ALBUTEROL SULFATE 2.5; .5 MG/3ML; MG/3ML
3 SOLUTION RESPIRATORY (INHALATION) EVERY 4 HOURS
Status: DISCONTINUED | OUTPATIENT
Start: 2023-11-11 | End: 2023-11-11

## 2023-11-11 RX ORDER — IPRATROPIUM BROMIDE AND ALBUTEROL SULFATE 2.5; .5 MG/3ML; MG/3ML
3 SOLUTION RESPIRATORY (INHALATION)
Status: DISCONTINUED | OUTPATIENT
Start: 2023-11-12 | End: 2023-11-13 | Stop reason: HOSPADM

## 2023-11-11 RX ORDER — ALBUTEROL SULFATE 0.83 MG/ML
2.5 SOLUTION RESPIRATORY (INHALATION) EVERY 4 HOURS PRN
Status: DISCONTINUED | OUTPATIENT
Start: 2023-11-11 | End: 2023-11-13 | Stop reason: HOSPADM

## 2023-11-11 RX ORDER — ALBUTEROL SULFATE 90 UG/1
2 AEROSOL, METERED RESPIRATORY (INHALATION) EVERY 6 HOURS PRN
COMMUNITY

## 2023-11-11 RX ORDER — ASPIRIN 325 MG
325 TABLET, DELAYED RELEASE (ENTERIC COATED) ORAL DAILY
Status: DISCONTINUED | OUTPATIENT
Start: 2023-11-11 | End: 2023-11-12

## 2023-11-11 RX ORDER — LORAZEPAM 0.5 MG/1
0.5 TABLET ORAL ONCE
Status: COMPLETED | OUTPATIENT
Start: 2023-11-11 | End: 2023-11-11

## 2023-11-11 RX ORDER — ONDANSETRON 2 MG/ML
4 INJECTION INTRAMUSCULAR; INTRAVENOUS EVERY 6 HOURS PRN
Status: DISCONTINUED | OUTPATIENT
Start: 2023-11-11 | End: 2023-11-13 | Stop reason: HOSPADM

## 2023-11-11 RX ORDER — AMOXICILLIN 500 MG/1
500 CAPSULE ORAL 3 TIMES DAILY
Status: ON HOLD | COMMUNITY
Start: 2023-11-07 | End: 2023-11-13

## 2023-11-11 RX ORDER — PREDNISONE 20 MG/1
40 TABLET ORAL DAILY
Status: DISCONTINUED | OUTPATIENT
Start: 2023-11-11 | End: 2023-11-13 | Stop reason: HOSPADM

## 2023-11-11 RX ORDER — METHYLPREDNISOLONE SODIUM SUCCINATE 125 MG/2ML
125 INJECTION, POWDER, LYOPHILIZED, FOR SOLUTION INTRAMUSCULAR; INTRAVENOUS ONCE
Status: COMPLETED | OUTPATIENT
Start: 2023-11-11 | End: 2023-11-11

## 2023-11-11 RX ORDER — AZITHROMYCIN 250 MG/1
500 TABLET, FILM COATED ORAL ONCE
Status: COMPLETED | OUTPATIENT
Start: 2023-11-11 | End: 2023-11-11

## 2023-11-11 RX ADMIN — ASPIRIN 325 MG: 325 TABLET, COATED ORAL at 18:59

## 2023-11-11 RX ADMIN — LORAZEPAM 0.5 MG: 0.5 TABLET ORAL at 13:20

## 2023-11-11 RX ADMIN — LORAZEPAM 0.5 MG: 0.5 TABLET ORAL at 18:59

## 2023-11-11 RX ADMIN — ATORVASTATIN CALCIUM 80 MG: 20 TABLET, FILM COATED ORAL at 19:00

## 2023-11-11 RX ADMIN — IPRATROPIUM BROMIDE AND ALBUTEROL SULFATE 3 ML: .5; 3 SOLUTION RESPIRATORY (INHALATION) at 23:05

## 2023-11-11 RX ADMIN — HYDROXYZINE HYDROCHLORIDE 10 MG: 10 TABLET ORAL at 22:15

## 2023-11-11 RX ADMIN — IPRATROPIUM BROMIDE AND ALBUTEROL SULFATE 3 ML: .5; 3 SOLUTION RESPIRATORY (INHALATION) at 14:15

## 2023-11-11 RX ADMIN — METHYLPREDNISOLONE SODIUM SUCCINATE 125 MG: 125 INJECTION, POWDER, FOR SOLUTION INTRAMUSCULAR; INTRAVENOUS at 13:21

## 2023-11-11 RX ADMIN — IPRATROPIUM BROMIDE AND ALBUTEROL SULFATE 3 ML: .5; 3 SOLUTION RESPIRATORY (INHALATION) at 20:07

## 2023-11-11 RX ADMIN — GUAIFENESIN 1200 MG: 600 TABLET, EXTENDED RELEASE ORAL at 20:07

## 2023-11-11 RX ADMIN — AZITHROMYCIN 500 MG: 250 TABLET, FILM COATED ORAL at 18:58

## 2023-11-11 RX ADMIN — IPRATROPIUM BROMIDE AND ALBUTEROL SULFATE 6 ML: .5; 3 SOLUTION RESPIRATORY (INHALATION) at 13:20

## 2023-11-11 ASSESSMENT — ACTIVITIES OF DAILY LIVING (ADL)
ADLS_ACUITY_SCORE: 22
ADLS_ACUITY_SCORE: 35
ADLS_ACUITY_SCORE: 22
ADLS_ACUITY_SCORE: 35
ADLS_ACUITY_SCORE: 35
ADLS_ACUITY_SCORE: 33

## 2023-11-11 NOTE — ED TRIAGE NOTES
A&O x4, ABCs intact. Pt presents with worsening SOB. Pt states that he has been on ABX and steroids for pneumonia but things are getting worse.        Triage Assessment (Adult)       Row Name 11/11/23 1240          Triage Assessment    Airway WDL WDL        Respiratory WDL    Respiratory WDL X;cough

## 2023-11-11 NOTE — PHARMACY-ADMISSION MEDICATION HISTORY
Pharmacist Admission Medication History    Admission medication history is complete. The information provided in this note is only as accurate as the sources available at the time of the update.    Information Source(s): Patient and CareEverywhere/SureScripts via in-person    Pertinent Information: Patient was prescribed a 10 day course of amoxicillin on 11/7.     Changes made to PTA medication list:  Added: Albuterol inhaler and amoxicillin   Deleted: Nicotine patch  Changed: None      Allergies reviewed with patient and updates made in EHR: yes    Medication History Completed By: Oskar Prater [facility] 11/11/2023 2:50 PM    PTA Med List   Medication Sig Last Dose    albuterol (PROAIR HFA/PROVENTIL HFA/VENTOLIN HFA) 108 (90 Base) MCG/ACT inhaler Inhale 2 puffs into the lungs every 6 hours as needed for shortness of breath, wheezing or cough Unknown at prn    amoxicillin (AMOXIL) 500 MG capsule Take 500 mg by mouth 3 times daily 11/11/2023 at am    aspirin (ASA) 325 MG EC tablet Take 1 tablet (325 mg) by mouth daily 11/10/2023 at am    atorvastatin (LIPITOR) 80 MG tablet Take 1 tablet (80 mg) by mouth every evening 11/10/2023 at pm    clopidogrel (PLAVIX) 75 MG tablet 1 tablet (75 mg) by Oral or NG Tube route daily 11/10/2023 at pm    DULoxetine (CYMBALTA) 60 MG capsule Take 60 mg by mouth daily 11/10/2023 at pm    HYDROcodone-acetaminophen (NORCO)  MG per tablet Take 1 tablet by mouth 4 times daily 11/11/2023 at am    hydrOXYzine (ATARAX) 25 MG tablet Take 25 mg by mouth 3 times daily as needed for anxiety Unknown at prn    ibuprofen (ADVIL/MOTRIN) 800 MG tablet Take 800 mg by mouth every 8 hours as needed for moderate pain Unknown at prn    QUEtiapine (SEROQUEL) 50 MG tablet Take  mg by mouth At Bedtime 11/10/2023 at pm    terazosin (HYTRIN) 10 MG capsule Take 10 mg by mouth daily 11/10/2023 at pm    traZODone (DESYREL) 50 MG tablet Take  mg by mouth nightly as needed for sleep Unknown at prn

## 2023-11-11 NOTE — H&P
[facility]    History and Physical - Hospitalist Service       Date of Admission:  11/11/2023    Assessment & Plan    Dinesh Farfan is a 58 year old male w/PMH extensive tobacco abuse, anxiety, DLD, L MCA stroke, aspiration PNA who presents from home with sob, cough and found to have suspected COPD exacerbation      Suspected COPD exacerbation  Borderline hypoxia  Hx extensive tobacco abuse  -presents with recurring symptoms c/w viral URI past 3-4 weeks with treatment with antibiotics, steroids during that period. Most recently given 1 week of steroids and completed about a week ago.  -now presents with persistent mostly non productive cough, sob, wheezing without fever, chills, NV  -in ED had negative CXR, viral panel negative, afebrile and no leukocytosis but with wheezing and borderline hypoxia. Received steroids, nebs with improvement  -cont prednisone and scheduled/PRN nebs, add azithromycin course as well for COPD exacerbation  -wean off oxygen as able  -NRT ordered and recommend complete smoking cessation    Hx L MCA stroke  DLD  -chart reviewed from last discharge, stroke neuro recommended  indefinitely after initial tx of ASA/plavix for 90 days (through 9/14/2023) so resume    -currently neurologically intact on exam, no reports of deficits  -cont statin    Hx anxiety, aspiration pneumonia  -await med rec        Diet:  regular diet  DVT Prophylaxis: Pneumatic Compression Devices  Rahman Catheter: Not present  Lines: None     Cardiac Monitoring: None      Anjum Blas DO  Hospitalist Service  [facility]  Securely message with [employer] (more info)  Text page via [employer] Paging/Directory     ______________________________________________________________________    Chief Complaint   Cough, sob    History of Present Illness   Dinesh Farfan is a 58 year old male w/PMH extensive tobacco abuse, anxiety, DLD, L MCA stroke, aspiration PNA who  presents from home with sob, cough. He reports a history of a few weeks of ongoing sob, cough, MIRANDA. Has been seen by PCP and treated at least twice for respiratory issues and pneumonia completing antibiotics and steroids. Had about a week of steroids that he completed appx 1 week ago. Initially felt better with them but over past week has noticed increased cough, sob but denies any fever, chills, NV, diarrhea, CP. Symptoms persisted and was having some dizzy spells and difficulty sleeping so presented to ED.    In ED was viral panel and CXR were negative but was noted to be borderline hypoxic with sats in low 90's at rest. Was given steroids, nebs with increased air entry but noted to be wheezy by ED staff. VBG was reassuring but did show evidence of chronic hypercapnia with elevated bicarb.      Past Medical History    Past Medical History:   Diagnosis Date    Herniated lumbar disc without myelopathy        Past Surgical History   No past surgical history on file.    Prior to Admission Medications   Prior to Admission Medications   Prescriptions Last Dose Informant Patient Reported? Taking?   DULoxetine (CYMBALTA) 60 MG capsule 11/10/2023 at pm Self Yes Yes   Sig: Take 60 mg by mouth daily   HYDROcodone-acetaminophen (NORCO)  MG per tablet 11/11/2023 at am Self Yes Yes   Sig: Take 1 tablet by mouth 4 times daily   QUEtiapine (SEROQUEL) 50 MG tablet 11/10/2023 at pm Self Yes Yes   Sig: Take  mg by mouth At Bedtime   albuterol (PROAIR HFA/PROVENTIL HFA/VENTOLIN HFA) 108 (90 Base) MCG/ACT inhaler Unknown at prn Self Yes Yes   Sig: Inhale 2 puffs into the lungs every 6 hours as needed for shortness of breath, wheezing or cough   amoxicillin (AMOXIL) 500 MG capsule 11/11/2023 at am Self Yes Yes   Sig: Take 500 mg by mouth 3 times daily   aspirin (ASA) 325 MG EC tablet 11/10/2023 at am Self No Yes   Sig: Take 1 tablet (325 mg) by mouth daily   atorvastatin (LIPITOR) 80 MG tablet 11/10/2023 at pm Self No Yes    Sig: Take 1 tablet (80 mg) by mouth every evening   clopidogrel (PLAVIX) 75 MG tablet 11/10/2023 at pm Self No Yes   Si tablet (75 mg) by Oral or NG Tube route daily   hydrOXYzine (ATARAX) 25 MG tablet Unknown at prn Self Yes Yes   Sig: Take 25 mg by mouth 3 times daily as needed for anxiety   ibuprofen (ADVIL/MOTRIN) 800 MG tablet Unknown at prn Self Yes Yes   Sig: Take 800 mg by mouth every 8 hours as needed for moderate pain   terazosin (HYTRIN) 10 MG capsule 11/10/2023 at pm Self Yes Yes   Sig: Take 10 mg by mouth daily   traZODone (DESYREL) 50 MG tablet Unknown at prn Self Yes Yes   Sig: Take  mg by mouth nightly as needed for sleep      Facility-Administered Medications: None        Review of Systems    The 10 point Review of Systems is negative other than noted in the HPI or here.     Social History   I have reviewed this patient's social history and updated it with pertinent information if needed.  Social History     Tobacco Use    Smoking status: Every Day     Packs/day: 1.00     Years: 0.00     Additional pack years: 0.00     Total pack years: 0.00     Types: Cigarettes   Substance Use Topics    Alcohol use: Yes     Comment: rarely    Drug use: No         Family History   I have reviewed this patient's family history and updated it with pertinent information if needed.  Family History   Problem Relation Age of Onset    Family History Negative Mother     Family History Negative Father          Allergies   Allergies   Allergen Reactions    Morphine Unknown     Takes hydrocodone without problem.        Physical Exam   Vital Signs: Temp: 97.4  F (36.3  C) Temp src: Oral BP: 135/80 Pulse: 98   Resp: 10 SpO2: 91 % O2 Device: Nasal cannula Oxygen Delivery: 2 LPM  Weight: 0 lbs 0 oz    Constitutional: awake, alert, and cooperative  Eyes: pupils equal, round and reactive to light and conjunctiva normal  ENT: normocepalic, without obvious abnormality, atramatic  Respiratory: diffusely diminished breath  sounds with moderate wheezing noted, frequent non productive cough  Cardiovascular: regular rate and rhythm and no murmur noted  GI: normal bowel sounds, soft, and non-distended  Skin: chronic skin changes to L leg c/w skin grafting, no bruising or bleeding  Neurologic: alert, oriented, no focal or general weakness noted. Moving all extremeties. No facial droop    60 MINUTES SPENT BY ME on the date of service doing chart review, history, exam, documentation & further activities per the note.      Data   ------------------------- PAST 24 HR DATA REVIEWED -----------------------------------------------    I have personally reviewed the following data over the past 24 hrs:    5.5  \   14.3   / 194     139 102 20.4 (H) /  103 (H)   4.2 28 0.87 \     Trop: 7 BNP: N/A       Imaging results reviewed over the past 24 hrs:   Recent Results (from the past 24 hour(s))   Chest XR,  PA & LAT    Narrative    EXAM: XR CHEST 2 VIEWS  LOCATION: [facility]  DATE: 11/11/2023    INDICATION: coug, SOB  COMPARISON: CT 6/13/2023      Impression    IMPRESSION: Negative chest.

## 2023-11-11 NOTE — ED NOTES
[facility]  ED Nurse Handoff Report    ED Chief complaint: Shortness of Breath  . ED Diagnosis:   Final diagnoses:   Acute bronchitis, unspecified organism   Hypoxia   History of tobacco abuse       Allergies:   Allergies   Allergen Reactions    Morphine Unknown     Takes hydrocodone without problem.       Code Status: Full Code    Activity level - Baseline/Home:  independent.  Activity Level - Current:   standby.   Lift room needed: No.   Bariatric: No   Needed: No   Isolation: No.   Infection: Not Applicable.     Respiratory status: Nasal cannula    Vital Signs (within 30 minutes):   Vitals:    11/11/23 1430 11/11/23 1436 11/11/23 1437 11/11/23 1438   BP: 135/80      Pulse: 93 93 91 98   Resp: 13 14 12 10   Temp:       TempSrc:       SpO2: 90% (!) 89% (!) 89% 90%       Cardiac Rhythm:  ,   Cardiac  Cardiac Rhythm: Normal sinus rhythm  Pain level:    Patient confused: No.   Patient Falls Risk: activity supervised.   Elimination Status: Has voided     Patient Report - Initial Complaint: SOB.   Focused Assessment:   1421  Neuro Cognitive  Neuro CognitiveCognitive/Neuro/Behavioral WDL: WDL  [place] Coma ScaleBest Eye Response: 4-->(E4) spontaneousBest Motor Response: 6-->(M6) obeys commandsBest Verbal Response: 5-->(V5) orientedGlasgow Coma Scale Score: 15        1421  Gastrointestinal  GastrointestinalGastrointestinal WDL: WDL SH       1421  Cardiac  Cardiac (Adult)Cardiac WDL: WDL; allCardiac Rhythm: NSR  Chest Pain AssessmentChest Pain Intervention: cardiac biomarkers drawn; cardiac monitoring continued; cardiac monitor placed; 12-lead ECG obtained        1420  Respiratory  RespiratoryAirway WDL: WDL  Respiratory WDLRespiratory WDL: .WDL except; all; rhythm/pattern; expansion/retractions; coughRhythm/Pattern, Respiratory: shortness of breath; tachypneic; pursed lip breathing (tripoding)Expansion/Accessory Muscles/Retractions: expansion symmetric; no retractions; no use of  accessory musclesNailbeds: no discolorationMucous Membranes: intact; pink; dryCough Frequency: frequentCough Type: productive          Abnormal Results:   Labs Ordered and Resulted from Time of ED Arrival to Time of ED Departure   BASIC METABOLIC PANEL - Abnormal       Result Value    Sodium 139      Potassium 4.2      Chloride 102      Carbon Dioxide (CO2) 28      Anion Gap 9      Urea Nitrogen 20.4 (*)     Creatinine 0.87      GFR Estimate >90      Calcium 9.4      Glucose 103 (*)    BLOOD GAS VENOUS - Abnormal    pH Venous 7.39      pCO2 Venous 49      pO2 Venous 40      Bicarbonate Venous 30 (*)     Base Excess/Deficit 3.6 (*)     FIO2 0     TROPONIN T, HIGH SENSITIVITY - Normal    Troponin T, High Sensitivity 7     INFLUENZA A/B, RSV, & SARS-COV2 PCR - Normal    Influenza A PCR Negative      Influenza B PCR Negative      RSV PCR Negative      SARS CoV2 PCR Negative     CBC WITH PLATELETS AND DIFFERENTIAL    WBC Count 5.5      RBC Count 4.61      Hemoglobin 14.3      Hematocrit 42.4      MCV 92      MCH 31.0      MCHC 33.7      RDW 13.3      Platelet Count 194      % Neutrophils 50      % Lymphocytes 34      % Monocytes 14      % Eosinophils 1      % Basophils 1      % Immature Granulocytes 0      NRBCs per 100 WBC 0      Absolute Neutrophils 2.7      Absolute Lymphocytes 1.9      Absolute Monocytes 0.8      Absolute Eosinophils 0.1      Absolute Basophils 0.0      Absolute Immature Granulocytes 0.0      Absolute NRBCs 0.0          Chest XR,  PA & LAT   Final Result   IMPRESSION: Negative chest.          Treatments provided: labs, imaging  Family Comments: family present at bedside  OBS brochure/video discussed/provided to patient:  Yes  ED Medications:   Medications   ipratropium - albuterol 0.5 mg/2.5 mg/3 mL (DUONEB) neb solution 6 mL (6 mLs Nebulization $Given 11/11/23 1320)   methylPREDNISolone sodium succinate (solu-MEDROL) injection 125 mg (125 mg Intravenous $Given 11/11/23 1321)   LORazepam (ATIVAN)  tablet 0.5 mg (0.5 mg Oral $Given 11/11/23 1327)   ipratropium - albuterol 0.5 mg/2.5 mg/3 mL (DUONEB) neb solution 3 mL (3 mLs Nebulization $Given 11/11/23 1811)       Drips infusing:  No  For the majority of the shift this patient was Green.   Interventions performed were N/A.    Sepsis treatment initiated: No    Cares/treatment/interventions/medications to be completed following ED care: N/A    ED Nurse Name: Tk Zhou RN  2:40 PM    RECEIVING UNIT ED HANDOFF REVIEW    Above ED Nurse Handoff Report was reviewed: Yes  Reviewed by: Danika Bowens RN on November 11, 2023 at 4:26 PM

## 2023-11-11 NOTE — ED PROVIDER NOTES
History     Chief Complaint:  Shortness of Breath       The history is provided by the patient and the spouse.      Dinesh Farfan is a 58 year old male who presents with shortness of breath. Patient's shortness of breath has been going on for a couple of weeks. Wife notes that they went see their PCP about his symptoms and was prescribed antibiotics and prednisone. When that ended patient's symptoms were still present so he was started on prednisone and azithromycin again, which also did not help. That ended lat week and patient is still experiencing shortness of breath, and a cough. Patient says he has also been experiencing wheezing, which stopped for about a week, but has been persistent once again. He admits to coughing up clear colored mucus, but denies any blood in mucus. Patient endorses chest pain from coughing. Patient had a CVA in June and has had some dizziness since that time, though denies this to be worse or any other new neurologic symptoms.  He is on Plavix and takes that regularly. Patient is a smoker and used to smoke one pack a day, but now smokes just a little less than that daily. No nebulizer use. Denies fever. Denies formal diagnosis of emphysema, or COPD.    Independent Historian:   Some history provided by the patient's wife.     Review of External Notes:   I reviewed office visit note form 10/25. History of prior CVA and is currently on Plavix. Also diagnosed with bronchitis and he was prescribed 40 mg of prednisone.     Medications:    Ventolin  Aspirin   Atorvastatin   Clopidogrel   Duloxetine   Norco  Hydroxyzine   Nicotine   Quetiapine   Terazosin   Trazodone   Tadalafil  Pregabalin  Vistaril    Past Medical History:    CVA  Spondylolisthesis of lumbosacral region   Cecal cancer   Kidney mass   Recurrent right inguinal hernia     Past Surgical History:    Inguinal hernia repair x2  Colonoscopy  Rotator cuff repair  Femur fracture fixation with intramedullary yani  Wrist surgery    Carpal tunnel release    Physical Exam   Patient Vitals for the past 24 hrs:   BP Temp Temp src Pulse Resp SpO2   11/11/23 1440 -- -- -- 98 10 91 %   11/11/23 1439 -- -- -- 96 12 92 %   11/11/23 1438 -- -- -- 98 10 90 %   11/11/23 1437 -- -- -- 91 12 (!) 89 %   11/11/23 1436 -- -- -- 93 14 (!) 89 %   11/11/23 1430 135/80 -- -- 93 13 90 %   11/11/23 1241 106/81 97.4  F (36.3  C) Oral 108 18 96 %      Physical Exam  General:              Well-nourished              Speaking in full sentences  Eyes:              Conjunctiva without injection or scleral icterus  ENT:              Moist mucous membranes              Nares patent              Pinnae normal  Neck:              Full ROM              No stiffness appreciated  Resp:              Increased work of breathing              Coarse breath sounds to right base              Faint expiratory wheezing    On reassessment, notable increase in degree of wheezing  CV:                    Tachycardic rate, regular rhythm              S1 and S2 present              No murmur, gallop or rub  GI:              BS present              Abdomen soft without distention              Non-tender to light and deep palpation              No guarding or rebound tenderness  Skin:              Warm, dry, well perfused              No rashes or open wounds on exposed skin  MSK:              Moves all extremities              No focal deformities or swelling  Neuro:              Alert              Answers questions appropriately              Moves all extremities equally              Gait stable  Psych:              Normal affect, normal mood    Emergency Department Course   ECG  ECG taken at 1338, ECG read at 1350  Normal sinus rhythm  Normal ECG   No significant change as compared to prior, dated 6/13/23.  Rate 93 bpm. KY interval 154 ms. QRS duration 102 ms. QT/QTc 370/460 ms. P-R-T axes 74 62 58.     Imaging:  Chest XR,  PA & LAT   Final Result   IMPRESSION: Negative chest.          Laboratory:  Labs Ordered and Resulted from Time of ED Arrival to Time of ED Departure   BASIC METABOLIC PANEL - Abnormal       Result Value    Sodium 139      Potassium 4.2      Chloride 102      Carbon Dioxide (CO2) 28      Anion Gap 9      Urea Nitrogen 20.4 (*)     Creatinine 0.87      GFR Estimate >90      Calcium 9.4      Glucose 103 (*)    BLOOD GAS VENOUS - Abnormal    pH Venous 7.39      pCO2 Venous 49      pO2 Venous 40      Bicarbonate Venous 30 (*)     Base Excess/Deficit 3.6 (*)     FIO2 0     TROPONIN T, HIGH SENSITIVITY - Normal    Troponin T, High Sensitivity 7     INFLUENZA A/B, RSV, & SARS-COV2 PCR - Normal    Influenza A PCR Negative      Influenza B PCR Negative      RSV PCR Negative      SARS CoV2 PCR Negative     CBC WITH PLATELETS AND DIFFERENTIAL    WBC Count 5.5      RBC Count 4.61      Hemoglobin 14.3      Hematocrit 42.4      MCV 92      MCH 31.0      MCHC 33.7      RDW 13.3      Platelet Count 194      % Neutrophils 50      % Lymphocytes 34      % Monocytes 14      % Eosinophils 1      % Basophils 1      % Immature Granulocytes 0      NRBCs per 100 WBC 0      Absolute Neutrophils 2.7      Absolute Lymphocytes 1.9      Absolute Monocytes 0.8      Absolute Eosinophils 0.1      Absolute Basophils 0.0      Absolute Immature Granulocytes 0.0      Absolute NRBCs 0.0        Emergency Department Course & Assessments:  Interventions:  Medications   ipratropium - albuterol 0.5 mg/2.5 mg/3 mL (DUONEB) neb solution 6 mL (6 mLs Nebulization $Given 11/11/23 1320)   methylPREDNISolone sodium succinate (solu-MEDROL) injection 125 mg (125 mg Intravenous $Given 11/11/23 1321)   LORazepam (ATIVAN) tablet 0.5 mg (0.5 mg Oral $Given 11/11/23 1320)   ipratropium - albuterol 0.5 mg/2.5 mg/3 mL (DUONEB) neb solution 3 mL (3 mLs Nebulization $Given 11/11/23 1415)      Assessments:  1301 I obtained history and examined the patient as noted above.  1410 Lungs sound much more wheezy upon recheck. O2 saturation at  rest is 90%.    Independent Interpretation (X-rays, CTs, rhythm strip):  CXR reviewed and no acute infiltrate noted    Consultations/Discussion of Management or Tests:   ED Course as of 11/11/23 1533   Sat Nov 11, 2023   1436 Case discussed with Dr. Blas who has accepted for admission       Social Determinants of Health affecting care:   None    Disposition:  The patient was admitted to the hospital under the care of Dr. Blas.     Impression & Plan    Medical Decision Making:  Dinesh Farfan is a 58-year-old male with a history of tobacco abuse who presents to the ED for evaluation of shortness of breath.  VS on presentation reveal tachycardia, though otherwise are unremarkable.  Based on the patient's history, ED evaluation, and results, high suspicion for underlying COPD/emphysema, with current exacerbation.  Patient reports history of chronic tobacco abuse, now with worsening cough.  His pulmonary exam demonstrates increased work of breathing, prolonged expiratory phase, as well as notable expiratory wheezing following initial bronchodilator therapy.  He was subsequently provided a total of 3 DuoNeb's, as well as IV Solu-Medrol.  Work of breathing did improve.  Patient did develop mild hypoxia, with SPO2 values in the upper 80s requiring supplemental oxygen via nasal cannula.  His chest x-ray does not reveal evidence of lobar infiltrate.  Viral testing returned negative.  I did consider pulmonary embolism, but clinically feel this to be unlikely given his objective pulmonary findings as well as response to bronchodilator therapy.  VBG demonstrates elevated PCO2 at 49, though metabolic compensation given elevated bicarb.  Given ongoing symptoms, borderline hypoxia, patient does require hospital admission for further supportive treatment and care.  Impression discussed with patient and spouse present at bedside.  At this time, do feel CT imaging of the chest can be deferred safely.  He does acknowledge chest  discomfort which is most likely secondary to recurrent coughing.  EKG demonstrates sinus rhythm without findings of acute ischemia.  High-sensitivity troponin has returned within normal limits.  Questions answered prior to admission.    Diagnosis:    ICD-10-CM    1. Acute bronchitis, unspecified organism  J20.9       2. Hypoxia  R09.02       3. History of tobacco abuse  Z87.891          Scribe Disclosure:  ISandra, am serving as a scribe at 1:42 PM on 11/11/2023 to document services personally performed by Zach Bolanos MD based on my observations and the provider's statements to me.     11/11/2023   Zach Bolanos MD Roach, Brian Donald, MD  11/11/23 1326

## 2023-11-12 LAB
ANION GAP SERPL CALCULATED.3IONS-SCNC: 8 MMOL/L (ref 7–15)
BUN SERPL-MCNC: 17.5 MG/DL (ref 6–20)
CALCIUM SERPL-MCNC: 8.8 MG/DL (ref 8.6–10)
CHLORIDE SERPL-SCNC: 103 MMOL/L (ref 98–107)
CREAT SERPL-MCNC: 0.69 MG/DL (ref 0.67–1.17)
DEPRECATED HCO3 PLAS-SCNC: 27 MMOL/L (ref 22–29)
EGFRCR SERPLBLD CKD-EPI 2021: >90 ML/MIN/1.73M2
ERYTHROCYTE [DISTWIDTH] IN BLOOD BY AUTOMATED COUNT: 13.4 % (ref 10–15)
GLUCOSE SERPL-MCNC: 129 MG/DL (ref 70–99)
HCT VFR BLD AUTO: 39.1 % (ref 40–53)
HGB BLD-MCNC: 13.4 G/DL (ref 13.3–17.7)
MCH RBC QN AUTO: 31.3 PG (ref 26.5–33)
MCHC RBC AUTO-ENTMCNC: 34.3 G/DL (ref 31.5–36.5)
MCV RBC AUTO: 91 FL (ref 78–100)
PLATELET # BLD AUTO: 192 10E3/UL (ref 150–450)
POTASSIUM SERPL-SCNC: 3.9 MMOL/L (ref 3.4–5.3)
RBC # BLD AUTO: 4.28 10E6/UL (ref 4.4–5.9)
SODIUM SERPL-SCNC: 138 MMOL/L (ref 135–145)
WBC # BLD AUTO: 14.4 10E3/UL (ref 4–11)

## 2023-11-12 PROCEDURE — 80048 BASIC METABOLIC PNL TOTAL CA: CPT | Performed by: HOSPITALIST

## 2023-11-12 PROCEDURE — 999N000157 HC STATISTIC RCP TIME EA 10 MIN

## 2023-11-12 PROCEDURE — 36415 COLL VENOUS BLD VENIPUNCTURE: CPT | Performed by: HOSPITALIST

## 2023-11-12 PROCEDURE — 250N000012 HC RX MED GY IP 250 OP 636 PS 637: Performed by: HOSPITALIST

## 2023-11-12 PROCEDURE — 94640 AIRWAY INHALATION TREATMENT: CPT

## 2023-11-12 PROCEDURE — 250N000009 HC RX 250: Performed by: HOSPITALIST

## 2023-11-12 PROCEDURE — 99232 SBSQ HOSP IP/OBS MODERATE 35: CPT | Performed by: HOSPITALIST

## 2023-11-12 PROCEDURE — 85027 COMPLETE CBC AUTOMATED: CPT | Performed by: HOSPITALIST

## 2023-11-12 PROCEDURE — 250N000013 HC RX MED GY IP 250 OP 250 PS 637: Performed by: STUDENT IN AN ORGANIZED HEALTH CARE EDUCATION/TRAINING PROGRAM

## 2023-11-12 PROCEDURE — 250N000013 HC RX MED GY IP 250 OP 250 PS 637: Performed by: HOSPITALIST

## 2023-11-12 PROCEDURE — G0378 HOSPITAL OBSERVATION PER HR: HCPCS

## 2023-11-12 RX ORDER — HYDROXYZINE HYDROCHLORIDE 25 MG/1
25 TABLET, FILM COATED ORAL 3 TIMES DAILY PRN
Status: DISCONTINUED | OUTPATIENT
Start: 2023-11-12 | End: 2023-11-13 | Stop reason: HOSPADM

## 2023-11-12 RX ORDER — ASPIRIN 81 MG/1
81 TABLET ORAL DAILY
Status: DISCONTINUED | OUTPATIENT
Start: 2023-11-13 | End: 2023-11-13 | Stop reason: HOSPADM

## 2023-11-12 RX ORDER — LORAZEPAM 1 MG/1
1 TABLET ORAL EVERY 4 HOURS PRN
Status: DISCONTINUED | OUTPATIENT
Start: 2023-11-12 | End: 2023-11-13 | Stop reason: HOSPADM

## 2023-11-12 RX ORDER — CLOPIDOGREL BISULFATE 75 MG/1
75 TABLET ORAL DAILY
Status: DISCONTINUED | OUTPATIENT
Start: 2023-11-12 | End: 2023-11-13 | Stop reason: HOSPADM

## 2023-11-12 RX ADMIN — IPRATROPIUM BROMIDE AND ALBUTEROL SULFATE 3 ML: .5; 3 SOLUTION RESPIRATORY (INHALATION) at 15:37

## 2023-11-12 RX ADMIN — Medication 1 MG: at 00:20

## 2023-11-12 RX ADMIN — IPRATROPIUM BROMIDE AND ALBUTEROL SULFATE 3 ML: .5; 3 SOLUTION RESPIRATORY (INHALATION) at 10:40

## 2023-11-12 RX ADMIN — GUAIFENESIN 1200 MG: 600 TABLET, EXTENDED RELEASE ORAL at 09:50

## 2023-11-12 RX ADMIN — PREDNISONE 40 MG: 20 TABLET ORAL at 09:50

## 2023-11-12 RX ADMIN — GUAIFENESIN 1200 MG: 600 TABLET, EXTENDED RELEASE ORAL at 20:14

## 2023-11-12 RX ADMIN — AZITHROMYCIN 250 MG: 250 TABLET, FILM COATED ORAL at 09:50

## 2023-11-12 RX ADMIN — LORAZEPAM 1 MG: 1 TABLET ORAL at 11:18

## 2023-11-12 RX ADMIN — LORAZEPAM 1 MG: 1 TABLET ORAL at 23:41

## 2023-11-12 RX ADMIN — LORAZEPAM 0.5 MG: 0.5 TABLET ORAL at 00:20

## 2023-11-12 RX ADMIN — ASPIRIN 325 MG: 325 TABLET, COATED ORAL at 09:50

## 2023-11-12 RX ADMIN — HYDROXYZINE HYDROCHLORIDE 25 MG: 25 TABLET, FILM COATED ORAL at 20:14

## 2023-11-12 RX ADMIN — HYDROXYZINE HYDROCHLORIDE 10 MG: 10 TABLET ORAL at 06:11

## 2023-11-12 RX ADMIN — LORAZEPAM 1 MG: 1 TABLET ORAL at 15:11

## 2023-11-12 RX ADMIN — ATORVASTATIN CALCIUM 80 MG: 20 TABLET, FILM COATED ORAL at 20:14

## 2023-11-12 RX ADMIN — IPRATROPIUM BROMIDE AND ALBUTEROL SULFATE 3 ML: .5; 3 SOLUTION RESPIRATORY (INHALATION) at 19:41

## 2023-11-12 RX ADMIN — Medication 5 MG: at 20:14

## 2023-11-12 RX ADMIN — CLOPIDOGREL BISULFATE 75 MG: 75 TABLET ORAL at 11:17

## 2023-11-12 ASSESSMENT — ACTIVITIES OF DAILY LIVING (ADL)
ADLS_ACUITY_SCORE: 24
ADLS_ACUITY_SCORE: 22
ADLS_ACUITY_SCORE: 24
ADLS_ACUITY_SCORE: 24
ADLS_ACUITY_SCORE: 22
ADLS_ACUITY_SCORE: 24
ADLS_ACUITY_SCORE: 22
ADLS_ACUITY_SCORE: 24

## 2023-11-12 NOTE — PROGRESS NOTES
"[facility]     Date:11/11/23    Admission Dx:COPD Exacerbation    Pulmonary History:COPD, Extensive tobacco abuse    Home Nebulizer/MDI Use:2p Alb Q6 prn    Home Oxygen:None    Acuity Level ([facility] flow sheet):3    Aerosol Therapy initiated:Duoneb QID, Alb Q4prn      Pulmonary Hygiene initiated:Deep breathe and coughing techniques      Volume Expansion initiated:IS      Current Oxygen Requirements:3L  Current SpO2:94%    Re-evaluation date:11/14/23    Patient Education:Education was performed with the patient in regards to indications/benefits and possible side effects of bronchodilators. Will continue to do education with patient.         See \"RT Assessments\" flow sheet for patient assessment scoring and Acuity Level Details.           "

## 2023-11-12 NOTE — PROGRESS NOTES
[facility]    Medicine Progress Note - Hospitalist Service    Date of Admission:  11/11/2023    Assessment & Plan    Dinesh Farfan is a 58 year old male w/PMH extensive tobacco abuse, anxiety, DLD, L MCA stroke, aspiration PNA who presents from home with sob, cough and found to have suspected COPD exacerbation      Suspected COPD exacerbation  Borderline hypoxia  Hx extensive tobacco abuse  -presents with recurring symptoms c/w viral URI past 3-4 weeks with treatment with antibiotics, steroids during that period. Most recently given 1 week of steroids and completed about a week ago.  -now presents with persistent mostly non productive cough, sob, wheezing without fever, chills, NV  -in ED had negative CXR, viral panel negative, afebrile and no leukocytosis but with wheezing and borderline hypoxia. Received steroids, nebs with improvement  -cont prednisone and scheduled/PRN nebs, add azithromycin course as well for COPD exacerbation  -NRT ordered and recommend complete smoking cessation  -still wheezing and on oxygen although improving, monitor overnight and possible discharge in am. May need walk test    Hx L MCA stroke  DLD  -chart reviewed from last discharge, stroke neuro recommended  indefinitely after initial tx of ASA/plavix for 90 days (through 9/14/2023). However patient wishes to cont his previously prescribed ASA/plavix until he follow up with neurology next month, resumed along with statin  -currently neurologically intact on exam, no reports of deficits    Hx anxiety, aspiration pneumonia  -resume home atarax and increase to home dosing, ativan PRN increased       Observation Goals: -diagnostic tests and consults completed and resulted, -vital signs normal or at patient baseline, Nurse to notify provider when observation goals have been met and patient is ready for discharge.  Diet: Regular Diet Adult    DVT Prophylaxis: Pneumatic Compression Devices  Rahman Catheter:  Not present  Lines: None     Cardiac Monitoring: None  Code Status: Full Code        Disposition Plan   Await improvement, possible discharge tomorrow         Anjum Blas DO  Hospitalist Service  [facility]  Securely message with [employer] (more info)  Text page via [employer] Paging/Directory   ______________________________________________________________________    Interval History   Still with some wheezing, reports improving sob. Still on oxygen but down to 1-2L. Has been having increased anxiety so will increase home atarax and ativan    Physical Exam   Vital Signs: Temp: 98.5  F (36.9  C) Temp src: Oral BP: 105/59 Pulse: 106   Resp: 20 SpO2: 92 % O2 Device: Nasal cannula Oxygen Delivery: 1 LPM  Weight: 197 lbs 3.2 oz  Constitutional: awake, alert, and cooperative  Eyes: pupils equal, round and reactive to light and conjunctiva normal  ENT: normocepalic, without obvious abnormality, atramatic  Respiratory: diffusely diminished breath sounds with improved but persistent wheezing noted, occasional non productive cough  Cardiovascular: regular rate and rhythm and no murmur noted  GI: normal bowel sounds, soft, and non-distended  Skin: chronic skin changes to L leg c/w skin grafting, no bruising or bleeding  Neurologic: alert, oriented, no focal or general weakness noted. Moving all extremeties. No facial droop    45 MINUTES SPENT BY ME on the date of service doing chart review, history, exam, documentation & further activities per the note.      Data   ------------------------- PAST 24 HR DATA REVIEWED -----------------------------------------------    I have personally reviewed the following data over the past 24 hrs:    14.4 (H)  \   13.4   / 192     138 103 17.5 /  129 (H)   3.9 27 0.69 \     Trop: 7 BNP: N/A       Imaging results reviewed over the past 24 hrs:   Recent Results (from the past 24 hour(s))   Chest XR,  PA & LAT    Narrative    EXAM: XR CHEST 2 VIEWS  LOCATION: [facility]  DATE: 11/11/2023    INDICATION: coug, SOB  COMPARISON: CT 6/13/2023      Impression    IMPRESSION: Negative chest.

## 2023-11-12 NOTE — PLAN OF CARE
Goal Outcome Evaluation:      Plan of Care Reviewed With: patient    Overall Patient Progress: improvingOverall Patient Progress: improving    Outcome Evaluation: Plan to wean 02 as able     PRIMARY DIAGNOSIS: COPD Exacerbation  OUTPATIENT/OBSERVATION GOALS TO BE MET BEFORE DISCHARGE:  1. Vital signs stable: Yes    2. Improvement of peak flow to greater than 70% sustained off nebulizer for 4 hours: NA    3. Dyspnea improved and O2 sats >88% at RA or at prior home O2 therapy level: Yes      SpO2: 94 %, O2 Device: Nasal cannula, weaned to 1 lpm    4. Short term supplemental O2 needed for use with activity at home: Possibly; continue to wean 02 as able    5. Tolerating adequate PO diet and medications: Yes    6. Return to near baseline physical activity: Yes    Discharge Planner Nurse   Safe discharge environment identified: Yes  Barriers to discharge: Yes       Entered by: Danika Bowens RN 11/12/2023      Please review provider order for any additional goals.   Nurse to notify provider when observation goals have been met and patient is ready for discharge.    Pt ambulated in [place] with SBA x1. Mild dyspnea and wheezing with activity. Pt ambulated with 2 lpm/NC; second ambulation this shift, pt walked on room air, 02 sats 92-94%. However, HR fluctuated between 110-120's, returned into low 100's after rest.      Congested, productive cough. Tachycardia (rates 110-118 after ambulation), improves after rest. Rate regular. Tolerating diet. Denies pain. Pt states he is claustrophobic and suffers from anxiety. Ativan dose increased this am, pt felt relief after the dose adjustment. Hopeful to discharge to home tomorrow.

## 2023-11-12 NOTE — PLAN OF CARE
PRIMARY DIAGNOSIS: COPD EXACERBATION   OUTPATIENT/OBSERVATION GOALS TO BE MET BEFORE DISCHARGE:  Dyspnea improved and O2 sats >88% on RA or back to baseline O2 levels: No   SpO2: 94 %, O2 Device: Nasal cannula    Tolerating oral abx or appropriate plans made outpatient infusion: Yes    Vitals signs normal or return to baseline: Yes    Short term supplemental O2 needed with activity at home: Yes    Tolerate oral intake to maintain hydration: Yes    Return to near baseline physical activity: Yes    Discharge Planner Nurse   Safe discharge environment identified: Yes  Barriers to discharge: No       Entered by: Randal Mesa RN 11/12/2023 2:39 AM   Pt is Alert, SBA, 02 3L NC, Pt has congested non-productive cough, plan is continue prednisone, nebs, Azithromycin, wean 02, smoking cessation.   Please review provider order for any additional goals.   Nurse to notify provider when observation goals have been met and patient is ready for discharge.Goal Outcome Evaluation:

## 2023-11-12 NOTE — PLAN OF CARE
Goal Outcome Evaluation:      Plan of Care Reviewed With: patient    Overall Patient Progress: improvingOverall Patient Progress: improving     PRIMARY DIAGNOSIS: COPD Exacerbation  OUTPATIENT/OBSERVATION GOALS TO BE MET BEFORE DISCHARGE:  1. Vital signs stable: Yes    2. Improvement of peak flow to greater than 70% sustained off nebulizer for 4 hours:  NA    3. Dyspnea improved and O2 sats >88% at RA or at prior home O2 therapy level: Yes      SpO2: 94 %, O2 Device: Nasal cannula    4. Short term supplemental O2 needed for use with activity at home:  Possibly; continue to wean 02 as able    5. Tolerating adequate PO diet and medications: Yes    6. Return to near baseline physical activity: Yes    Discharge Planner Nurse   Safe discharge environment identified: Yes  Barriers to discharge: Yes       Entered by: Danika Bowens RN 11/12/2023      Please review provider order for any additional goals.   Nurse to notify provider when observation goals have been met and patient is ready for discharge.

## 2023-11-12 NOTE — PLAN OF CARE
PRIMARY DIAGNOSIS: COPD EXACERBATION  OUTPATIENT/OBSERVATION GOALS TO BE MET BEFORE DISCHARGE:  Dyspnea improved and O2 sats >88% on RA or back to baseline O2 levels: No   SpO2: 94 %, O2 Device: Nasal cannula    Tolerating oral abx or appropriate plans made outpatient infusion: Yes    Vitals signs normal or return to baseline: Yes    Short term supplemental O2 needed with activity at home: Yes    Tolerate oral intake to maintain hydration: Yes    Return to near baseline physical activity: Yes    Discharge Planner Nurse   Safe discharge environment identified: Yes  Barriers to discharge: No       Entered by: Randal Mesa RN 11/11/2023 11:28 PM     Please review provider order for any additional goals.   Nurse to notify provider when observation goals have been met and patient is ready for discharge.Goal Outcome Evaluation:

## 2023-11-12 NOTE — PLAN OF CARE
Goal Outcome Evaluation:      Plan of Care Reviewed With: patient    Overall Patient Progress: improvingOverall Patient Progress: improving       Goal Outcome Evaluation:      Plan of Care Reviewed With: patient    Overall Patient Progress: improvingOverall Patient Progress: improving     PRIMARY DIAGNOSIS: COPD Exacerbation  OUTPATIENT/OBSERVATION GOALS TO BE MET BEFORE DISCHARGE:  1. Vital signs stable: Yes    2. Improvement of peak flow to greater than 70% sustained off nebulizer for 4 hours: NA    3. Dyspnea improved and O2 sats >88% at RA or at prior home O2 therapy level: Yes      SpO2: 94 %, O2 Device: Nasal cannula, weaned to 1 lpm    4. Short term supplemental O2 needed for use with activity at home: Possibly; continue to wean 02 as able    5. Tolerating adequate PO diet and medications: Yes    6. Return to near baseline physical activity: Yes    Discharge Planner Nurse   Safe discharge environment identified: Yes  Barriers to discharge: Yes       Entered by: Danika Bowens RN 11/12/2023      Please review provider order for any additional goals.   Nurse to notify provider when observation goals have been met and patient is ready for discharge.

## 2023-11-13 ENCOUNTER — APPOINTMENT (OUTPATIENT)
Dept: PHYSICAL THERAPY | Facility: CLINIC | Age: 58
End: 2023-11-13
Attending: HOSPITALIST
Payer: COMMERCIAL

## 2023-11-13 VITALS
OXYGEN SATURATION: 94 % | WEIGHT: 197.2 LBS | BODY MASS INDEX: 29.12 KG/M2 | TEMPERATURE: 98.3 F | SYSTOLIC BLOOD PRESSURE: 139 MMHG | DIASTOLIC BLOOD PRESSURE: 89 MMHG | RESPIRATION RATE: 20 BRPM | HEART RATE: 82 BPM

## 2023-11-13 LAB
ATRIAL RATE - MUSE: 93 BPM
DIASTOLIC BLOOD PRESSURE - MUSE: NORMAL MMHG
INTERPRETATION ECG - MUSE: NORMAL
P AXIS - MUSE: 74 DEGREES
PR INTERVAL - MUSE: 154 MS
QRS DURATION - MUSE: 102 MS
QT - MUSE: 370 MS
QTC - MUSE: 460 MS
R AXIS - MUSE: 62 DEGREES
SYSTOLIC BLOOD PRESSURE - MUSE: NORMAL MMHG
T AXIS - MUSE: 58 DEGREES
VENTRICULAR RATE- MUSE: 93 BPM

## 2023-11-13 PROCEDURE — 94640 AIRWAY INHALATION TREATMENT: CPT | Mod: 76

## 2023-11-13 PROCEDURE — G0378 HOSPITAL OBSERVATION PER HR: HCPCS

## 2023-11-13 PROCEDURE — 250N000009 HC RX 250: Performed by: HOSPITALIST

## 2023-11-13 PROCEDURE — 250N000012 HC RX MED GY IP 250 OP 636 PS 637: Performed by: HOSPITALIST

## 2023-11-13 PROCEDURE — 99239 HOSP IP/OBS DSCHRG MGMT >30: CPT | Performed by: INTERNAL MEDICINE

## 2023-11-13 PROCEDURE — 999N000157 HC STATISTIC RCP TIME EA 10 MIN

## 2023-11-13 PROCEDURE — 250N000013 HC RX MED GY IP 250 OP 250 PS 637: Performed by: INTERNAL MEDICINE

## 2023-11-13 PROCEDURE — 250N000013 HC RX MED GY IP 250 OP 250 PS 637: Performed by: HOSPITALIST

## 2023-11-13 PROCEDURE — 99406 BEHAV CHNG SMOKING 3-10 MIN: CPT | Performed by: PHYSICAL THERAPIST

## 2023-11-13 RX ORDER — PREDNISONE 10 MG/1
TABLET ORAL
Qty: 20 TABLET | Refills: 0 | Status: SHIPPED | OUTPATIENT
Start: 2023-11-13 | End: 2024-06-13

## 2023-11-13 RX ORDER — GUAIFENESIN 200 MG/10ML
200 LIQUID ORAL EVERY 4 HOURS PRN
Status: DISCONTINUED | OUTPATIENT
Start: 2023-11-13 | End: 2023-11-13 | Stop reason: HOSPADM

## 2023-11-13 RX ORDER — AZITHROMYCIN 250 MG/1
250 TABLET, FILM COATED ORAL DAILY
Qty: 3 TABLET | Refills: 0 | Status: SHIPPED | OUTPATIENT
Start: 2023-11-14 | End: 2023-11-17

## 2023-11-13 RX ORDER — GUAIFENESIN 600 MG/1
1200 TABLET, EXTENDED RELEASE ORAL 2 TIMES DAILY
Qty: 28 TABLET | Refills: 0 | Status: SHIPPED | OUTPATIENT
Start: 2023-11-13 | End: 2023-11-20

## 2023-11-13 RX ORDER — POLYETHYLENE GLYCOL 3350 17 G
2 POWDER IN PACKET (EA) ORAL
Qty: 72 LOZENGE | Refills: 0 | Status: SHIPPED | OUTPATIENT
Start: 2023-11-13

## 2023-11-13 RX ADMIN — PREDNISONE 40 MG: 20 TABLET ORAL at 09:11

## 2023-11-13 RX ADMIN — GUAIFENESIN 200 MG: 200 SOLUTION ORAL at 05:42

## 2023-11-13 RX ADMIN — LORAZEPAM 1 MG: 1 TABLET ORAL at 07:17

## 2023-11-13 RX ADMIN — ASPIRIN 81 MG: 81 TABLET, COATED ORAL at 09:11

## 2023-11-13 RX ADMIN — CLOPIDOGREL BISULFATE 75 MG: 75 TABLET ORAL at 09:11

## 2023-11-13 RX ADMIN — AZITHROMYCIN 250 MG: 250 TABLET, FILM COATED ORAL at 09:11

## 2023-11-13 RX ADMIN — IPRATROPIUM BROMIDE AND ALBUTEROL SULFATE 3 ML: .5; 3 SOLUTION RESPIRATORY (INHALATION) at 08:10

## 2023-11-13 RX ADMIN — ALBUTEROL SULFATE 2.5 MG: 2.5 SOLUTION RESPIRATORY (INHALATION) at 05:52

## 2023-11-13 RX ADMIN — GUAIFENESIN 1200 MG: 600 TABLET, EXTENDED RELEASE ORAL at 09:11

## 2023-11-13 RX ADMIN — HYDROXYZINE HYDROCHLORIDE 25 MG: 25 TABLET, FILM COATED ORAL at 11:24

## 2023-11-13 ASSESSMENT — ACTIVITIES OF DAILY LIVING (ADL)
ADLS_ACUITY_SCORE: 24

## 2023-11-13 NOTE — PLAN OF CARE
PRIMARY DIAGNOSIS:  COPD EXACERBATION    OUTPATIENT/OBSERVATION GOALS TO BE MET BEFORE DISCHARGE:  Dyspnea improved and O2 sats >88% on RA or back to baseline O2 levels: No   SpO2: 93 %, O2 Device: Nasal cannula    Tolerating oral abx or appropriate plans made outpatient infusion: Yes    Vitals signs normal or return to baseline: Yes    Short term supplemental O2 needed with activity at home: Yes    Tolerate oral intake to maintain hydration: Yes    Return to near baseline physical activity: Yes    Discharge Planner Nurse   Safe discharge environment identified: Yes  Barriers to discharge: No       Entered by: Randal Mesa RN 11/13/2023 2:55 AM    Pt is Alert, SBA, 02 1L NC, Pt has congested productive cough, plan is continue prednisone, nebs, Azithromycin, wean 02, smoking cessation.  Ativan & Atarax given for anxiety, Pt has dyspnea on exertion/ 02 level drops with activity ,  Albuterol PRN given for SOB/ wheezing , Robitussin PRN given for cough. Possible discharge 11/13/23.   Please review provider order for any additional goals.   Nurse to notify provider when observation goals have been met and patient is ready for discharge.Goal Outcome Evaluation:

## 2023-11-13 NOTE — PROVIDER NOTIFICATION
Patient has been assessed for Home Oxygen needs. Oxygen readings:    *Pulse oximetry (SpO2) = 94% on room air at rest while awake.    *SpO2 improved to 94% on 1 liters/minute at rest.    *SpO2 = 93% on room air during activity/with exercise.    *SpO2 improved to 94% on 1 liters/minute during activity/with exercise.

## 2023-11-13 NOTE — PLAN OF CARE
PRIMARY DIAGNOSIS: COPD EXACERBATION    OUTPATIENT/OBSERVATION GOALS TO BE MET BEFORE DISCHARGE:  Dyspnea improved and O2 sats >88% on RA or back to baseline O2 levels: No   SpO2: 94 %, O2 Device: Nasal cannula    Tolerating oral abx or appropriate plans made outpatient infusion: Yes    Vitals signs normal or return to baseline: Yes    Short term supplemental O2 needed with activity at home: Yes    Tolerate oral intake to maintain hydration: Yes    Return to near baseline physical activity: Yes    Discharge Planner Nurse   Safe discharge environment identified: Yes  Barriers to discharge: No       Entered by: Randal Mesa RN 11/12/2023 8:06 PM     Please review provider order for any additional goals.   Nurse to notify provider when observation goals have been met and patient is ready for discharge.Goal Outcome Evaluation:

## 2023-11-13 NOTE — PLAN OF CARE
Patient's After Visit Summary was reviewed with patient and/or spouse.   Patient verbalized understanding of After Visit Summary, recommended follow up and was given an opportunity to ask questions.   Discharge medications sent home with patient/family: YES   Discharged with spouse

## 2023-11-13 NOTE — DISCHARGE SUMMARY
[facility]  Discharge Summary  Name: Dinesh Farfan    MRN: [id number]  YOB: 1965    Age: 58 year old  Date of Discharge:  11/13/2023  Date of Admission: 11/11/2023  Primary Care Provider: Gian Hoffman  Discharge Physician:  Francisco Hernandez M.D  Discharging Service:  Hospitalist      Discharge Diagnosis:  Dinesh Farfan is a 58 year old male w/PMH extensive tobacco abuse, anxiety, DLD, L MCA stroke, aspiration PNA who presents from home with sob, cough and found to have suspected COPD exacerbation     Suspected COPD exacerbation.  Borderline hypoxia.  Hx of extensive tobacco abuse.  She presented with recurring symptoms c/w viral URI past 3-4 weeks with treatment with antibiotics, steroids during that period. Most recently given 1 week of steroids and completed about a week ago.  This time she presented with persistent mostly non productive cough, sob, wheezing without fever, chills, NV.  Work-up in ED was negative CXR, viral panel negative, afebrile and no leukocytosis but with wheezing and borderline hypoxia. Received steroids, nebs with improvement.  She was on bronchodilators and nebulizers.  Continued her oral steroids, recommended complete smoking cessation.  She was on oxygen, able to wean her off and remained in the low 90s on room air with activity and  discharged in a stable condition.     Hx L MCA stroke  DLD  -chart reviewed from last discharge, stroke neuro recommended  indefinitely after initial tx of ASA/plavix for 90 days (through 9/14/2023). However patient wishes to cont his previously prescribed ASA/plavix until he follow up with neurology next month, resumed along with statin  -currently neurologically intact on exam, no reports of deficits     Hx anxiety, aspiration pneumonia  -resume home atarax and increase to home dosing, ativan PRN increased.    Clinically Significant Risk Factors Present on Admission                # Drug Induced Platelet Defect:  home medication list includes an antiplatelet medication   # Hypertension: Home medication list includes antihypertensive(s)                   Other Diagnosis:  None     Discharge Disposition:  Discharged to home     Allergies:  Allergies   Allergen Reactions    Morphine Unknown     Takes hydrocodone without problem.        Discharge Medications:   Current Discharge Medication List        START taking these medications    Details   azithromycin (ZITHROMAX) 250 MG tablet Take 1 tablet (250 mg) by mouth daily for 3 days  Qty: 3 tablet, Refills: 0    Associated Diagnoses: Chronic obstructive pulmonary disease, unspecified COPD type (H)      guaiFENesin (MUCINEX) 600 MG 12 hr tablet Take 2 tablets (1,200 mg) by mouth 2 times daily for 7 days  Qty: 28 tablet, Refills: 0    Associated Diagnoses: Chronic obstructive pulmonary disease, unspecified COPD type (H)      ipratropium-albuterol (COMBIVENT RESPIMAT)  MCG/ACT inhaler Inhale 1 puff into the lungs 4 times daily for 30 days  Qty: 1 each, Refills: 0    Associated Diagnoses: Chronic obstructive pulmonary disease, unspecified COPD type (H)      nicotine (COMMIT) 2 MG lozenge Place 1 lozenge (2 mg) inside cheek every hour as needed for other (nicotine withdrawal symptoms)  Qty: 72 lozenge, Refills: 0    Associated Diagnoses: History of tobacco abuse      predniSONE (DELTASONE) 10 MG tablet 4 tabs daily for 2 days, then 3 tabs daily for 2 days, then 2 tabs daily for 2 days, then 1 tab daily for 2 days, then stop  Qty: 20 tablet, Refills: 0    Associated Diagnoses: Chronic obstructive pulmonary disease, unspecified COPD type (H)           CONTINUE these medications which have NOT CHANGED    Details   albuterol (PROAIR HFA/PROVENTIL HFA/VENTOLIN HFA) 108 (90 Base) MCG/ACT inhaler Inhale 2 puffs into the lungs every 6 hours as needed for shortness of breath, wheezing or cough      aspirin (ASA) 325 MG EC tablet Take 1 tablet (325 mg) by mouth daily  Qty: 90 tablet,  Refills: 3    Associated Diagnoses: Cerebrovascular accident (CVA), unspecified mechanism (H)      atorvastatin (LIPITOR) 80 MG tablet Take 1 tablet (80 mg) by mouth every evening  Qty: 90 tablet, Refills: 0    Associated Diagnoses: Cerebrovascular accident (CVA) due to embolism of left middle cerebral artery (H)      clopidogrel (PLAVIX) 75 MG tablet 1 tablet (75 mg) by Oral or NG Tube route daily  Qty: 90 tablet, Refills: 0    Associated Diagnoses: Cerebrovascular accident (CVA) due to embolism of left middle cerebral artery (H)      DULoxetine (CYMBALTA) 60 MG capsule Take 60 mg by mouth daily      hydrOXYzine (ATARAX) 25 MG tablet Take 25 mg by mouth 3 times daily as needed for anxiety      ibuprofen (ADVIL/MOTRIN) 800 MG tablet Take 800 mg by mouth every 8 hours as needed for moderate pain      QUEtiapine (SEROQUEL) 50 MG tablet Take  mg by mouth At Bedtime      terazosin (HYTRIN) 10 MG capsule Take 10 mg by mouth daily      traZODone (DESYREL) 50 MG tablet Take  mg by mouth nightly as needed for sleep           STOP taking these medications       amoxicillin (AMOXIL) 500 MG capsule Comments:   Reason for Stopping:         HYDROcodone-acetaminophen (NORCO)  MG per tablet Comments:   Reason for Stopping:                Condition on Discharge:  Discharge condition: Stable   Discharge vitals: Blood pressure 139/89, pulse 82, temperature 98.3  F (36.8  C), temperature source Oral, resp. rate 20, weight 89.4 kg (197 lb 3.2 oz), SpO2 94%.   Code status on discharge: Full Code     History of Illness:  See detailed admission note for full details.    Significant Physical Exam Findings:  Constitutional: awake, alert, and cooperative  Eyes: pupils equal, round and reactive to light and conjunctiva normal  ENT: normocepalic, without obvious abnormality, atramatic  Respiratory: diffusely diminished breath sounds with improved but persistent wheezing noted, occasional non productive cough  Cardiovascular:  regular rate and rhythm and no murmur noted  GI: normal bowel sounds, soft, and non-distended  Skin: chronic skin changes to L leg c/w skin grafting, no bruising or bleeding  Neurologic: alert, oriented, no focal or general weakness noted. Moving all extremeties. No facial droop    Procedures other than Imaging:  none     Imaging:  Results for orders placed or performed during the hospital encounter of 11/11/23   Chest XR,  PA & LAT    Narrative    EXAM: XR CHEST 2 VIEWS  LOCATION: [facility]  DATE: 11/11/2023    INDICATION: coug, SOB  COMPARISON: CT 6/13/2023      Impression    IMPRESSION: Negative chest.        Consultations:  No consultations were requested during this admission.     Recent Lab Results:  Recent Labs   Lab 11/12/23  0832 11/11/23  1320   WBC 14.4* 5.5   HGB 13.4 14.3   HCT 39.1* 42.4   MCV 91 92    194     Recent Labs   Lab 11/12/23  0832 11/11/23  1320    139   POTASSIUM 3.9 4.2   CHLORIDE 103 102   CO2 27 28   ANIONGAP 8 9   * 103*   BUN 17.5 20.4*   CR 0.69 0.87   GFRESTIMATED >90 >90   DUSTIN 8.8 9.4          Pending Results:    Unresulted Labs Ordered in the Past 30 Days of this Admission       No orders found from 10/12/2023 to 11/12/2023.                Reason for your hospital stay    COPD exacerbation     Follow-up and recommended labs and tests     Follow up with primary care provider, Gian Hoffman, within 7 days for hospital follow- up.      Recommend follow up with Pulmonologist as an outpatient.     Activity    Your activity upon discharge: activity as tolerated     Diet    Follow this diet upon discharge: Orders Placed This Encounter      Regular Diet Adult          Total time spent in face to face contact with the patient and coordinating discharge was:  >30 Minutes.

## 2023-11-13 NOTE — PLAN OF CARE
PRIMARY DIAGNOSIS: COPD EXACERBATION   OUTPATIENT/OBSERVATION GOALS TO BE MET BEFORE DISCHARGE:  Dyspnea improved and O2 sats >88% on RA or back to baseline O2 levels: No   SpO2: 94 %, O2 Device: Nasal cannula    Tolerating oral abx or appropriate plans made outpatient infusion: Yes    Vitals signs normal or return to baseline: Yes    Short term supplemental O2 needed with activity at home: Yes    Tolerate oral intake to maintain hydration: Yes    Return to near baseline physical activity: No    Discharge Planner Nurse   Safe discharge environment identified: Yes  Barriers to discharge: Yes       Entered by: Lexi Reyes RN 11/13/2023      Please review provider order for any additional goals.   Nurse to notify provider when observation goals have been met and patient is ready for discharge.

## 2023-11-15 ENCOUNTER — PATIENT OUTREACH (OUTPATIENT)
Dept: CARE COORDINATION | Facility: CLINIC | Age: 58
End: 2023-11-15
Payer: COMMERCIAL

## 2023-11-15 NOTE — PROGRESS NOTES
Connected Care Resource Center Contact  [facility]/Voicemail     Clinical Data: Post-Discharge Outreach     Outreach attempted x 2.  Left message on patient's voicemail, providing [facility]'s central phone number of [phone] ([phone]) for questions/concerns and/or to schedule an appt with an [facility] provider, if they do not have a PCP.      Plan:  [facility] will do no further outreaches at this time.       BOSSMAN Payton  [facility], [facility]    *Connected Care Resource Team does NOT follow patient ongoing. Referrals are identified based on internal discharge reports and the outreach is to ensure patient has an understanding of their discharge instructions.

## 2023-12-12 NOTE — PROGRESS NOTES
__________________________________________________________    ___________________________________  ESTABLISHED PATIENT NEUROLOGY NOTE    DATE OF VISIT: 9/20/2023  MRN: [id number]  PATIENT NAME: Dinesh Farfan  YOB: 1965      Chief Complaint: Patient presents with:  Stroke: Patient reports headaches and vision concerns.    SUBJECTIVE:                                                      History of Present Illness: Dinesh Farfan is a 58 year old male presenting for follow-up for R MCA territory infarct.     He was hospitalized at [facility] on 06/11/23 - 06/12/23 and 06/13/23 - 06/16/23. Prior to the hospital stay, he had a past medical history of tobacco use and colon cancer s/p chemotherapy .    He was admitted to [facility] 6/11-6/12 for L MCA stroke in the setting of chronic appearing L CCA/ICA occlusion. He returned to the ED 6/13/23 for evaluation of worsening left facial weakness, worsening aphasia, RUE weakness that started at 0900. EMS noted that he was hypoxic into the low 80s and hypotensive with SBPs in the mid-80s. He was found to have suspected aspiration pneumonia.     Stroke Evaluation summarized:    MRI/Head CT MRI 6/13: Evolving areas of recent infarct in the anterior left middle cerebral artery territory affecting the frontal lobe and insula. The areas of ischemic tissue are slightly more confluent but overall unchanged in size.   Intracranial Vasculature 1.  Decreased filling of anterior division M2 middle cerebral artery branches on the left versus 6/11/2023 consistent with vessel occlusion. This generally corresponds to the area of infarct  identified on CT and MRI.  2.  The more proximal M1 segment of the left middle cerebral artery and posterior division M2 branches appear patent.  3.  The proximal intracranial segment of the left internal carotid artery remains occluded until the level of the clinoid process, possibly slightly progressed.   Cervical Vasculature 1.   Unchanged occlusion of the left common and internal carotid arteries. Filling of the external branches is presumably via collaterals.  2.  Mild right carotid artery atherosclerosis without significant narrowing.  3.  Moderate to severe narrowing at the origin of the right vertebral artery unchanged. No dissection.         LDL  6/11/2023: 92 mg/dL   A1C  6/11/2023: 6.0 %   Troponin 6/13/2023: <6 ng/L     Impression  57 M with known L CCA/ICA occlusion and recent R MCA territory infarct with acute worsening in the setting of hypotension and hypoxia. MRI brain with more confluent area of infarct.      Recommendations  - Continue DAPT with  mg + Plavix 75 mg for 90 days, then  mg alone indefinitely   - Would not acutely lower BP while inpatient given L CCA/ICA occlusion and worsening infarct. Slow down titration over the next several weeks.   - Repeat CUS in 3 months to evaluate for stability of R ICA (currently ~20% stenosis)    12/13/23: Dinesh presents to the clinic today for poststroke follow-up.  He is accompanied by his wife Neeru.  He states that on June 11 he was out walking his dog when he realized he forgot his keys. He tried to get a hold of his wife.  He had a hard time texting or making phone calls due to confusion and slurred speech.  He presented to the hospital for stroke workup.  One day after discharge she returned with right arm weakness.  He was outside having a cigarette drinking a cup of coffee.  As he entered the house he spilt his coffee due to his right-sided weakness.  Patient also had worsening dysarthria at that time as well.  He was brought back into the hospital. Dinesh tells me that his speech is 70 to 80% back to baseline.  He still has mild difficulty when he is anxious/nervous.  He denies any dysphagia.  Right-sided weakness is fully resolved.  Denies residual effects of right-sided numbness or weakness. Dinesh complains of headaches described as pressure-like pain  across his forehead a few times per week.  These are often triggered by eye movements.  He states that he feels like his eyes do not work together poststroke.     Dinesh continues to take aspirin 325 mg tablet daily.  No cardiac event monitor completed.  Last LDL is 92.  Patient started on atorvastatin.  A1c is 6.0%.  We discussed healthy lifestyle modifications.  Patient was able to stop smoking for a few weeks on the patch poststroke but has resumed smoking half a pack per day.  He would like nicotine patches reordered.    Dad and brother with passed away within a short timeframe.  Patient had a diagnosis of cancer, now in remission and he had stressors in addition to his recent stroke.  Patient was meeting with psychologist.  He no longer feels he needs to meet with them though he is still in the grieving process.       Current Prevention Medications:      Plavix 75 x 90 days  Atorvastatin  Nicotine patch - no  Terazosin    Perceived Side Effects: No     Psycho-Social History: Dinesh Farfan currently lives [place] with wife Neeru. Highest level of education GED . Employment status: no, [employer], [employer].   Patient does smoke- 1/2 pack a day, no alcohol use, no recreational drug use.  Currently, patient denies feeling depressed, denies feeling anhedonia, denies suicidal  thoughts, and denies having feelings of excessive guilt/worthlessness.  We reviewed importance of mental and emotional wellbeing and impact on health.      Current Medications:   albuterol (PROAIR HFA/PROVENTIL HFA/VENTOLIN HFA) 108 (90 Base) MCG/ACT inhaler, Inhale 2 puffs into the lungs every 6 hours as needed for shortness of breath, wheezing or cough  aspirin (ASA) 325 MG EC tablet, Take 1 tablet (325 mg) by mouth daily  atorvastatin (LIPITOR) 80 MG tablet, Take 1 tablet (80 mg) by mouth every evening  DULoxetine (CYMBALTA) 60 MG capsule, Take 60 mg by mouth daily  hydrOXYzine (ATARAX) 25 MG tablet, Take 25 mg by mouth 3  times daily as needed for anxiety  ibuprofen (ADVIL/MOTRIN) 800 MG tablet, Take 800 mg by mouth every 8 hours as needed for moderate pain  ipratropium-albuterol (COMBIVENT RESPIMAT)  MCG/ACT inhaler, Inhale 1 puff into the lungs 4 times daily for 30 days  nicotine (COMMIT) 2 MG lozenge, Place 1 lozenge (2 mg) inside cheek every hour as needed for other (nicotine withdrawal symptoms)  predniSONE (DELTASONE) 10 MG tablet, 4 tabs daily for 2 days, then 3 tabs daily for 2 days, then 2 tabs daily for 2 days, then 1 tab daily for 2 days, then stop  QUEtiapine (SEROQUEL) 50 MG tablet, Take  mg by mouth At Bedtime  terazosin (HYTRIN) 10 MG capsule, Take 10 mg by mouth daily  traZODone (DESYREL) 50 MG tablet, Take  mg by mouth nightly as needed for sleep  clopidogrel (PLAVIX) 75 MG tablet, 1 tablet (75 mg) by Oral or NG Tube route daily (Patient not taking: Reported on 12/13/2023)    No current facility-administered medications on file prior to visit.    Past Medical History:   Patient  has a past medical history of Herniated lumbar disc without myelopathy.  Surgical History:  He  has no past surgical history on file.  Family and Social History:  Reviewed, and he  reports that he has been smoking cigarettes. He has been smoking an average of 1.00 packs per day. He does not have any smokeless tobacco history on file. He reports current alcohol use. He reports that he does not use drugs.  Reviewed, and family history includes Family History Negative in his father and mother.    RECENT DIAGNOSTIC STUDIES:   Labs:    Coagulation studies:  Recent Labs   Lab Test 06/13/23  1027 06/11/23  1220   INR 0.96 0.91        Lipid panel:  Recent Labs   Lab Test 06/11/23  1220   CHOL 168   HDL 44   LDL 92   TRIG 162*       HbA1C:  Recent Labs   Lab Test 06/11/23  1220   A1C 6.0*       Troponin:  No lab results found.  No lab results found.  No lab results found.    Imaging: see HPI     REVIEW OF SYSTEMS:                      "                                 10-point review of systems is negative except as mentioned above in HPI.    EXAM:                                                      Physical Exam:   Vitals: /78   Pulse 81   Ht 1.753 m (5' 9\")   Wt 89.4 kg (197 lb)   BMI 29.09 kg/m    BMI= Body mass index is 29.09 kg/m .  GENERAL: NAD.  HEENT: NC/AT.  PULM: Non-labored breathing.   Neurologic:  MENTAL STATUS: Alert, attentive. Speech is fluent. Normal comprehension. Normal concentration. Adequate fund of knowledge.   CRANIAL NERVES: Visual fields intact to confrontation. Pupils equally, round and reactive to light. Facial sensation and movement normal. EOM full. Hearing intact to conversation. Trapezius strength intact. Palate moves symmetrically. Tongue midline.  MOTOR: 5/5 in proximal and distal muscle groups of upper and lower extremities. Tone and bulk normal.   SENSATION: Normal light touch throughout.   COORDINATION: Normal finger nose finger. Finger tapping normal. Knee heel shin normal.  STATION AND GAIT: Romberg Negative. Good postural reflexes. Casual gait normal  right hand-dominant.      ASSESSMENT and PLAN:                                                      Assessment:    ICD-10-CM    1. Cerebrovascular accident (CVA), unspecified mechanism (H)  I63.9 Stroke Hospital Follow Up      2. Cerebral infarction due to occlusion of left carotid artery (H)  I63.232 Stroke Hospital Follow Up          Dinesh Farfan is a 58 year old male presenting for follow-up for R MCA territory infarct. He was hospitalized at [facility] on 06/11/23 - 06/12/23 and 06/13/23 - 06/16/23. Prior to the hospital stay, he had a past medical history of tobacco use and colon cancer s/p chemotherapy. He was admitted to [facility] 6/11-6/12 for L MCA stroke in the setting of chronic appearing L CCA/ICA occlusion. He returned to the ED 6/13/23 for evaluation of worsening left facial weakness, worsening aphasia, RUE weakness.  " Patient is close to baseline.  Dysarthria as 70 to 80% back to baseline.  We discussed interventions outlined below and a plan to see the patient back in 6 months to establish care with a neurologist.  Dinesh understands and agrees with this plan    Plan:     Symptom management  -Ophthalmologist referral- vision changes post stroke, blurred vision.    Diagnostic workup  - Cardiac event monitor to rule out atrial fibrillation     Secondary prevention  - Continue  mg daily     Lifestyle  - Mediterranean diet  - Exercise    Risk Factors   Elevated cholesterol levels   LDL: 92  - Goal < 70 mg/dl  - Continue preventive therapy: Atorvastatin as prescribed  - Follow with primary care provider to get lipid panel drawn     Prediabetes   A1c: 6.0%  - Goal: < 7.0%  - Continue preventive therapy: Healthy lifestyle modifications     Smoking  - Goal: Stop smoking  - Plan: Nicotine patch ordered.  Follow-up with your primary care provider to taper down on dose  Resources: [email or web address]     Obstructive sleep apnea  -Sleep study referral placed    --- Plan to follow-up with neurosurgery - referral placed  --- Plan to follow-up with your primary care provider to manage your vascular risk factors  --- Plan on follow up in the Neurology Clinic in 6 months.  --- Please feel free to reach out if you have any further questions or concerns.  --- Seek immediate medical attention if an emergency arises or if your health becomes progressively worse.     For any acute neurologic deficits he was advised to  go directly to the hospital rather than call the clinic.    It was a pleasure to meet you today!     Total Time: Total time spent for face to face visit, reviewing labs/imaging studies, counseling and coordination of care was: 45 Minutes spent on the date of the encounter doing chart review, interpretation of tests, patient visit, documentation, and discussion with family     This note was dictated using voice recognition  software.  Any grammatical or context distortions are unintentional and inherent to the software.    Neeru Coffey, DARLIN, APRN, CNP  [facility]

## 2023-12-13 ENCOUNTER — OFFICE VISIT (OUTPATIENT)
Dept: NEUROLOGY | Facility: CLINIC | Age: 58
End: 2023-12-13
Attending: NURSE PRACTITIONER
Payer: COMMERCIAL

## 2023-12-13 VITALS
SYSTOLIC BLOOD PRESSURE: 136 MMHG | HEART RATE: 81 BPM | WEIGHT: 197 LBS | HEIGHT: 69 IN | DIASTOLIC BLOOD PRESSURE: 78 MMHG | BODY MASS INDEX: 29.18 KG/M2

## 2023-12-13 DIAGNOSIS — I63.232 CEREBRAL INFARCTION DUE TO OCCLUSION OF LEFT CAROTID ARTERY (H): ICD-10-CM

## 2023-12-13 DIAGNOSIS — I63.9 CEREBROVASCULAR ACCIDENT (CVA), UNSPECIFIED MECHANISM (H): ICD-10-CM

## 2023-12-13 PROCEDURE — 99204 OFFICE O/P NEW MOD 45 MIN: CPT

## 2023-12-13 RX ORDER — NICOTINE 21 MG/24HR
1 PATCH, TRANSDERMAL 24 HOURS TRANSDERMAL EVERY 24 HOURS
Qty: 28 PATCH | Refills: 0 | Status: SHIPPED | OUTPATIENT
Start: 2023-12-13

## 2023-12-13 NOTE — LETTER
12/13/2023         RE: Dinesh Farfan  [address] [facility] [address]        Dear Colleague,    Thank you for referring your patient, Dinesh Farfan, to the [facility] [place]. Please see a copy of my visit note below.    __________________________________________________________    ___________________________________  ESTABLISHED PATIENT NEUROLOGY NOTE    DATE OF VISIT: 9/20/2023  MRN: [id number]  PATIENT NAME: Dinesh Farfan  YOB: 1965      Chief Complaint: Patient presents with:  Stroke: Patient reports headaches and vision concerns.    SUBJECTIVE:                                                      History of Present Illness: Dinesh Farfan is a 58 year old male presenting for follow-up for R MCA territory infarct.     He was hospitalized at [facility] on 06/11/23 - 06/12/23 and 06/13/23 - 06/16/23. Prior to the hospital stay, he had a past medical history of tobacco use and colon cancer s/p chemotherapy .    He was admitted to [facility] 6/11-6/12 for L MCA stroke in the setting of chronic appearing L CCA/ICA occlusion. He returned to the ED 6/13/23 for evaluation of worsening left facial weakness, worsening aphasia, RUE weakness that started at 0900. EMS noted that he was hypoxic into the low 80s and hypotensive with SBPs in the mid-80s. He was found to have suspected aspiration pneumonia.     Stroke Evaluation summarized:    MRI/Head CT MRI 6/13: Evolving areas of recent infarct in the anterior left middle cerebral artery territory affecting the frontal lobe and insula. The areas of ischemic tissue are slightly more confluent but overall unchanged in size.   Intracranial Vasculature 1.  Decreased filling of anterior division M2 middle cerebral artery branches on the left versus 6/11/2023 consistent with vessel occlusion. This generally corresponds to the area of infarct  identified on CT and MRI.  2.  The more proximal M1  segment of the left middle cerebral artery and posterior division M2 branches appear patent.  3.  The proximal intracranial segment of the left internal carotid artery remains occluded until the level of the clinoid process, possibly slightly progressed.   Cervical Vasculature 1.  Unchanged occlusion of the left common and internal carotid arteries. Filling of the external branches is presumably via collaterals.  2.  Mild right carotid artery atherosclerosis without significant narrowing.  3.  Moderate to severe narrowing at the origin of the right vertebral artery unchanged. No dissection.         LDL  6/11/2023: 92 mg/dL   A1C  6/11/2023: 6.0 %   Troponin 6/13/2023: <6 ng/L     Impression  57 M with known L CCA/ICA occlusion and recent R MCA territory infarct with acute worsening in the setting of hypotension and hypoxia. MRI brain with more confluent area of infarct.      Recommendations  - Continue DAPT with  mg + Plavix 75 mg for 90 days, then  mg alone indefinitely   - Would not acutely lower BP while inpatient given L CCA/ICA occlusion and worsening infarct. Slow down titration over the next several weeks.   - Repeat CUS in 3 months to evaluate for stability of R ICA (currently ~20% stenosis)    12/13/23: Dinesh presents to the clinic today for poststroke follow-up.  He is accompanied by his wife Neeru.  He states that on June 11 he was out walking his dog when he realized he forgot his keys. He tried to get a hold of his wife.  He had a hard time texting or making phone calls due to confusion and slurred speech.  He presented to the hospital for stroke workup.  One day after discharge she returned with right arm weakness.  He was outside having a cigarette drinking a cup of coffee.  As he entered the house he spilt his coffee due to his right-sided weakness.  Patient also had worsening dysarthria at that time as well.  He was brought back into the hospital. Dinesh tells me that his speech is  70 to 80% back to baseline.  He still has mild difficulty when he is anxious/nervous.  He denies any dysphagia.  Right-sided weakness is fully resolved.  Denies residual effects of right-sided numbness or weakness. Dinesh complains of headaches described as pressure-like pain across his forehead a few times per week.  These are often triggered by eye movements.  He states that he feels like his eyes do not work together poststroke.     Dinesh continues to take aspirin 325 mg tablet daily.  No cardiac event monitor completed.  Last LDL is 92.  Patient started on atorvastatin.  A1c is 6.0%.  We discussed healthy lifestyle modifications.  Patient was able to stop smoking for a few weeks on the patch poststroke but has resumed smoking half a pack per day.  He would like nicotine patches reordered.    Dad and brother with passed away within a short timeframe.  Patient had a diagnosis of cancer, now in remission and he had stressors in addition to his recent stroke.  Patient was meeting with psychologist.  He no longer feels he needs to meet with them though he is still in the grieving process.       Current Prevention Medications:      Plavix 75 x 90 days  Atorvastatin  Nicotine patch - no  Terazosin    Perceived Side Effects: No     Psycho-Social History: Dinesh Farfan currently lives [place] with wife Neeru. Highest level of education GED . Employment status: no, [employer], [employer].   Patient does smoke- 1/2 pack a day, no alcohol use, no recreational drug use.  Currently, patient denies feeling depressed, denies feeling anhedonia, denies suicidal  thoughts, and denies having feelings of excessive guilt/worthlessness.  We reviewed importance of mental and emotional wellbeing and impact on health.      Current Medications:   albuterol (PROAIR HFA/PROVENTIL HFA/VENTOLIN HFA) 108 (90 Base) MCG/ACT inhaler, Inhale 2 puffs into the lungs every 6 hours as needed for shortness of breath, wheezing or  cough  aspirin (ASA) 325 MG EC tablet, Take 1 tablet (325 mg) by mouth daily  atorvastatin (LIPITOR) 80 MG tablet, Take 1 tablet (80 mg) by mouth every evening  DULoxetine (CYMBALTA) 60 MG capsule, Take 60 mg by mouth daily  hydrOXYzine (ATARAX) 25 MG tablet, Take 25 mg by mouth 3 times daily as needed for anxiety  ibuprofen (ADVIL/MOTRIN) 800 MG tablet, Take 800 mg by mouth every 8 hours as needed for moderate pain  ipratropium-albuterol (COMBIVENT RESPIMAT)  MCG/ACT inhaler, Inhale 1 puff into the lungs 4 times daily for 30 days  nicotine (COMMIT) 2 MG lozenge, Place 1 lozenge (2 mg) inside cheek every hour as needed for other (nicotine withdrawal symptoms)  predniSONE (DELTASONE) 10 MG tablet, 4 tabs daily for 2 days, then 3 tabs daily for 2 days, then 2 tabs daily for 2 days, then 1 tab daily for 2 days, then stop  QUEtiapine (SEROQUEL) 50 MG tablet, Take  mg by mouth At Bedtime  terazosin (HYTRIN) 10 MG capsule, Take 10 mg by mouth daily  traZODone (DESYREL) 50 MG tablet, Take  mg by mouth nightly as needed for sleep  clopidogrel (PLAVIX) 75 MG tablet, 1 tablet (75 mg) by Oral or NG Tube route daily (Patient not taking: Reported on 12/13/2023)    No current facility-administered medications on file prior to visit.    Past Medical History:   Patient  has a past medical history of Herniated lumbar disc without myelopathy.  Surgical History:  He  has no past surgical history on file.  Family and Social History:  Reviewed, and he  reports that he has been smoking cigarettes. He has been smoking an average of 1.00 packs per day. He does not have any smokeless tobacco history on file. He reports current alcohol use. He reports that he does not use drugs.  Reviewed, and family history includes Family History Negative in his father and mother.    RECENT DIAGNOSTIC STUDIES:   Labs:    Coagulation studies:  Recent Labs   Lab Test 06/13/23  1027 06/11/23  1220   INR 0.96 0.91        Lipid panel:  Recent  "Labs   Lab Test 06/11/23  1220   CHOL 168   HDL 44   LDL 92   TRIG 162*       HbA1C:  Recent Labs   Lab Test 06/11/23  1220   A1C 6.0*       Troponin:  No lab results found.  No lab results found.  No lab results found.    Imaging: see HPI     REVIEW OF SYSTEMS:                                                      10-point review of systems is negative except as mentioned above in HPI.    EXAM:                                                      Physical Exam:   Vitals: /78   Pulse 81   Ht 1.753 m (5' 9\")   Wt 89.4 kg (197 lb)   BMI 29.09 kg/m    BMI= Body mass index is 29.09 kg/m .  GENERAL: NAD.  HEENT: NC/AT.  PULM: Non-labored breathing.   Neurologic:  MENTAL STATUS: Alert, attentive. Speech is fluent. Normal comprehension. Normal concentration. Adequate fund of knowledge.   CRANIAL NERVES: Visual fields intact to confrontation. Pupils equally, round and reactive to light. Facial sensation and movement normal. EOM full. Hearing intact to conversation. Trapezius strength intact. Palate moves symmetrically. Tongue midline.  MOTOR: 5/5 in proximal and distal muscle groups of upper and lower extremities. Tone and bulk normal.   SENSATION: Normal light touch throughout.   COORDINATION: Normal finger nose finger. Finger tapping normal. Knee heel shin normal.  STATION AND GAIT: Romberg Negative. Good postural reflexes. Casual gait normal  right hand-dominant.      ASSESSMENT and PLAN:                                                      Assessment:    ICD-10-CM    1. Cerebrovascular accident (CVA), unspecified mechanism (H)  I63.9 Stroke Hospital Follow Up      2. Cerebral infarction due to occlusion of left carotid artery (H)  I63.232 Stroke Hospital Follow Up          Dinesh MERRILL Delbertcarl is a 58 year old male presenting for follow-up for R MCA territory infarct. He was hospitalized at [facility] on 06/11/23 - 06/12/23 and 06/13/23 - 06/16/23. Prior to the hospital stay, he had a past medical " history of tobacco use and colon cancer s/p chemotherapy. He was admitted to [facility] 6/11-6/12 for L MCA stroke in the setting of chronic appearing L CCA/ICA occlusion. He returned to the ED 6/13/23 for evaluation of worsening left facial weakness, worsening aphasia, RUE weakness.  Patient is close to baseline.  Dysarthria as 70 to 80% back to baseline.  We discussed interventions outlined below and a plan to see the patient back in 6 months to establish care with a neurologist.  Dinesh understands and agrees with this plan    Plan:     Symptom management  -Ophthalmologist referral- vision changes post stroke, blurred vision.    Diagnostic workup  - Cardiac event monitor to rule out atrial fibrillation     Secondary prevention  - Continue  mg daily     Lifestyle  - Mediterranean diet  - Exercise    Risk Factors   Elevated cholesterol levels   LDL: 92  - Goal < 70 mg/dl  - Continue preventive therapy: Atorvastatin as prescribed  - Follow with primary care provider to get lipid panel drawn     Prediabetes   A1c: 6.0%  - Goal: < 7.0%  - Continue preventive therapy: Healthy lifestyle modifications     Smoking  - Goal: Stop smoking  - Plan: Nicotine patch ordered.  Follow-up with your primary care provider to taper down on dose  Resources: [email or web address]     Obstructive sleep apnea  -Sleep study referral placed    --- Plan to follow-up with neurosurgery - referral placed  --- Plan to follow-up with your primary care provider to manage your vascular risk factors  --- Plan on follow up in the Neurology Clinic in 6 months.  --- Please feel free to reach out if you have any further questions or concerns.  --- Seek immediate medical attention if an emergency arises or if your health becomes progressively worse.     For any acute neurologic deficits he was advised to  go directly to the hospital rather than call the clinic.    It was a pleasure to meet you today!     Total Time: Total time spent for face to face  visit, reviewing labs/imaging studies, counseling and coordination of care was: 45 Minutes spent on the date of the encounter doing chart review, interpretation of tests, patient visit, documentation, and discussion with family     This note was dictated using voice recognition software.  Any grammatical or context distortions are unintentional and inherent to the software.    Neeru Coffey DNP, APRN, CNP  [facility]         Again, thank you for allowing me to participate in the care of your patient.        Sincerely,        SARA Barragan CNP

## 2023-12-13 NOTE — PATIENT INSTRUCTIONS
Plan:     Symptom management  -Ophthalmologist referral- vision changes post stroke, blurred vision.    Diagnostic workup  - Cardiac event monitor to rule out atrial fibrillation     Secondary prevention  - Continue  mg daily     Lifestyle  - Mediterranean diet  - Exercise    Risk Factors   Elevated cholesterol levels   LDL: 92  - Goal < 70 mg/dl  - Continue preventive therapy: Atorvastatin as prescribed  - Follow with primary care provider to get lipid panel drawn     Prediabetes   A1c: 6.0%  - Goal: < 7.0%  - Continue preventive therapy: Healthy lifestyle modifications     Smoking  - Goal: Stop smoking  - Plan: Nicotine patch ordered.  Follow-up with your primary care provider to taper down on dose  Resources: [email or web address]     Obstructive sleep apnea  -Sleep study referral placed    --- Plan to follow-up with neurosurgery   --- Plan to follow-up with your primary care provider to manage your vascular risk factors  --- Plan on follow up in the Neurology Clinic in 6 months.  --- Please feel free to reach out if you have any further questions or concerns.  --- Seek immediate medical attention if an emergency arises or if your health becomes progressively worse.     For any acute neurologic deficits he was advised to  go directly to the hospital rather than call the clinic.    It was a pleasure to meet you today!

## 2023-12-14 ENCOUNTER — TELEPHONE (OUTPATIENT)
Dept: NEUROSURGERY | Facility: CLINIC | Age: 58
End: 2023-12-14
Payer: COMMERCIAL

## 2023-12-14 NOTE — TELEPHONE ENCOUNTER
Called patient to advise appointment with Dr. Arechiga at  Neurosurgery was scheduled incorrectly and needs to be rescheduled at [facility]. Patient in understanding and was advised the correct scheduling team will reach out to get him scheduled there. Referral was re-routed to  Vascular.

## 2023-12-14 NOTE — TELEPHONE ENCOUNTER
[facility] Call Center    Phone Message    May a detailed message be left on voicemail: yes     Reason for Call: Appointment Intake    Referring Provider Name: Neeru Coffey  Diagnosis and/or Symptoms: Cerebral infarction due to occlusion of left carotid artery (H) [I63.232]     Pt spouse called to schedule Pt. Pt spouse did state that they don't have preference to the [place] location that is on the referral and will go wherever Pt can get in the soonest. Please review and call back to schedule or send to [place].    Action Taken: Message routed to:  [facility] Neurosurgery    Travel Screening: Not Applicable

## 2023-12-15 ENCOUNTER — HOSPITAL ENCOUNTER (OUTPATIENT)
Dept: CARDIOLOGY | Facility: CLINIC | Age: 58
Discharge: HOME | End: 2023-12-15
Payer: COMMERCIAL

## 2023-12-15 DIAGNOSIS — I63.9 CEREBROVASCULAR ACCIDENT (CVA), UNSPECIFIED MECHANISM (H): ICD-10-CM

## 2023-12-15 PROCEDURE — 93270 REMOTE 30 DAY ECG REV/REPORT: CPT

## 2023-12-15 PROCEDURE — 93272 ECG/REVIEW INTERPRET ONLY: CPT | Performed by: INTERNAL MEDICINE

## 2023-12-15 NOTE — TELEPHONE ENCOUNTER
FUTURE VISIT INFORMATION      FUTURE VISIT INFORMATION:  Date: 1/31/24  Time: 8:30am  Location: [facility]  REFERRAL INFORMATION:  Referring provider:  Neeru Coffey CVA   Referring providers clinic:  [facility]  Reason for visit/diagnosis  Cerebrovascular accident (CVA), unspecified mechanism (H)     RECORDS REQUESTED FROM:       Clinic name Comments Records Status Imaging Status   [facility] Referral 12/13/23 EPIC    Imaging MR Brain done 6/13/23  CTA Head done 6/13/23  MR Brain done 6/11/23 [facility]

## 2023-12-15 NOTE — LETTER
January 23, 2024      Dinesh Farfan  [address] [place] [facility] [address]        Dear ,    We are writing to inform you of your test results.    Your test results for the cardiac event monitor did not show any signs of atrial fibrillation or atrial flutter.  Please continue with your current treatment plan and we will see you back in the clinic in June.  Your next appointment to meet with one of our neurologist, Dr. Costello, is on June 13, 2024.  We look forward to seeing you then.    If you have any questions or concerns, please call the clinic at the number listed above.       Sincerely,      SARA Barragan CNP

## 2023-12-18 ENCOUNTER — OFFICE VISIT (OUTPATIENT)
Dept: SLEEP MEDICINE | Facility: CLINIC | Age: 58
End: 2023-12-18
Payer: COMMERCIAL

## 2023-12-18 VITALS
DIASTOLIC BLOOD PRESSURE: 82 MMHG | OXYGEN SATURATION: 94 % | HEART RATE: 64 BPM | SYSTOLIC BLOOD PRESSURE: 131 MMHG | RESPIRATION RATE: 20 BRPM | BODY MASS INDEX: 30.62 KG/M2 | HEIGHT: 68 IN | WEIGHT: 202 LBS

## 2023-12-18 DIAGNOSIS — G47.33 OBSTRUCTIVE SLEEP APNEA: Primary | ICD-10-CM

## 2023-12-18 DIAGNOSIS — I63.9 CEREBROVASCULAR ACCIDENT (CVA), UNSPECIFIED MECHANISM (H): ICD-10-CM

## 2023-12-18 PROCEDURE — 99203 OFFICE O/P NEW LOW 30 MIN: CPT | Performed by: STUDENT IN AN ORGANIZED HEALTH CARE EDUCATION/TRAINING PROGRAM

## 2023-12-18 RX ORDER — HYDROCODONE BITARTRATE AND ACETAMINOPHEN 10; 325 MG/1; MG/1
TABLET ORAL
COMMUNITY
Start: 2023-11-24

## 2023-12-18 RX ORDER — NALOXONE HYDROCHLORIDE 4 MG/.1ML
SPRAY NASAL
COMMUNITY
Start: 2023-08-16

## 2023-12-18 RX ORDER — PREGABALIN 100 MG/1
1 CAPSULE ORAL 3 TIMES DAILY
COMMUNITY
Start: 2023-06-20 | End: 2024-06-13

## 2023-12-18 RX ORDER — CHLORHEXIDINE GLUCONATE ORAL RINSE 1.2 MG/ML
SOLUTION DENTAL
COMMUNITY
Start: 2023-11-20

## 2023-12-18 RX ORDER — HYDROXYZINE PAMOATE 25 MG/1
CAPSULE ORAL
COMMUNITY
Start: 2023-10-25

## 2023-12-18 RX ORDER — TADALAFIL 10 MG/1
10 TABLET ORAL
COMMUNITY
Start: 2023-10-25

## 2023-12-18 ASSESSMENT — SLEEP AND FATIGUE QUESTIONNAIRES
HOW LIKELY ARE YOU TO NOD OFF OR FALL ASLEEP WHILE SITTING INACTIVE IN A PUBLIC PLACE: WOULD NEVER DOZE
HOW LIKELY ARE YOU TO NOD OFF OR FALL ASLEEP WHILE SITTING AND TALKING TO SOMEONE: WOULD NEVER DOZE
HOW LIKELY ARE YOU TO NOD OFF OR FALL ASLEEP WHILE SITTING AND READING: MODERATE CHANCE OF DOZING
HOW LIKELY ARE YOU TO NOD OFF OR FALL ASLEEP IN A CAR, WHILE STOPPED FOR A FEW MINUTES IN TRAFFIC: WOULD NEVER DOZE
HOW LIKELY ARE YOU TO NOD OFF OR FALL ASLEEP WHILE WATCHING TV: MODERATE CHANCE OF DOZING
HOW LIKELY ARE YOU TO NOD OFF OR FALL ASLEEP WHEN YOU ARE A PASSENGER IN A CAR FOR AN HOUR WITHOUT A BREAK: WOULD NEVER DOZE
HOW LIKELY ARE YOU TO NOD OFF OR FALL ASLEEP WHILE LYING DOWN TO REST IN THE AFTERNOON WHEN CIRCUMSTANCES PERMIT: MODERATE CHANCE OF DOZING
HOW LIKELY ARE YOU TO NOD OFF OR FALL ASLEEP WHILE SITTING QUIETLY AFTER LUNCH WITHOUT ALCOHOL: SLIGHT CHANCE OF DOZING

## 2023-12-18 NOTE — NURSING NOTE
"Chief Complaint   Patient presents with    Consult For    Sleep Problem       Initial /82   Pulse 64   Resp 20   Ht 1.727 m (5' 8\")   Wt 91.6 kg (202 lb)   SpO2 94%   BMI 30.71 kg/m   Estimated body mass index is 30.71 kg/m  as calculated from the following:    Height as of this encounter: 1.727 m (5' 8\").    Weight as of this encounter: 91.6 kg (202 lb).    Medication Reconciliation: complete    Neck circumference:  inches / 45 centimeters.    DME:     Camilo Tejeda MA  [facility]    "

## 2023-12-18 NOTE — PATIENT INSTRUCTIONS
"MY INFORMATION ON SLEEP APNEA-  Dinesh Farfan    DOCTOR : Josesito Clayton MD  SLEEP CENTER :      MY CONTACT NUMBER:    [facility]   [phone]  [facility]    Am I having a sleep study at a sleep center?  Make sure you have an appointment for the study before you leave!  [email or web address]          Frequently asked questions:  1. What is Obstructive Sleep Apnea (EDUARDO)? EDUARDO is the most common type of sleep apnea. Apnea means, \"without breath.\"  Apnea is most often caused by narrowing or collapse of the upper airway as muscles relax during sleep.   Almost everyone has occasional apneas. Most people with sleep apnea have had brief interruptions at night frequently for many years.  The severity of sleep apnea is related to how frequent and severe the events are.   Apneas are any events where there is no breathing.  Hypopneas are any events where there is shallow breathing. Sleep studies will track and then add all of the apneas and hypopneas into a total and then divided this number by the total time asleep to obtain the apnea hypopnea index(AHI). 0-5 events/per hour = No Sleep Apnea; 5-15 events/per hour = Mild Sleep Apnea; 15-30 events/per hour = Moderate Sleep Apnea; Greater than 30 events/per hour = Severe Sleep Apnea.  Sleep apnea is typically worse during REM sleep due to complete muscle relaxation, including the muscles of the airway. Sleep apnea is also typically worse during supine(sleeping on your back) sleep due to internal structures more easily falling on the top of the airway and external pressures more easily crushing the airway.  The respiratory disturbance index(RDI) includes apneas, hypopneas, and other respiratory events such as snoring that may disturb your sleep.  2. What are the consequences of EDUARDO? Symptoms include: feeling sleepy during the day, snoring loudly, gasping or " stopping of breathing, trouble sleeping, and occasionally morning headaches or heartburn at night.  Sleepiness can be serious and even increase the risk of falling asleep while driving. Other health consequences may include development of high blood pressure and other cardiovascular disease in persons who are susceptible. Untreated EDUARDO  can contribute to heart disease, stroke and diabetes.   3. What are the treatment options? In most situations, sleep apnea is a lifelong disease that must be managed with daily therapy. Medications are not effective for sleep apnea and surgery is generally not considered until other therapies have been tried. Your treatment is your choice . Continuous Positive Airway (CPAP) works right away and is the therapy that is effective in nearly everyone. An oral device to hold your jaw forward is usually the next most reliable option. Other options include postioning devices (to keep you off your back), weight loss, and surgery including a tongue pacing device. There is more detail about some of these options below.    Important tips for using CPAP and similar devices   Know your equipment:  CPAP is continuous positive airway pressure that prevents obstructive sleep apnea by keeping the throat from collapsing while you are sleeping. In most cases, the device is  smart  and can slowly self-adjusts if your throat collapses and keeps a record every day of how well you are treated-this information is available to you and your care team.  BPAP is bilevel positive airway pressure that keeps your throat open and also assists each breath with a pressure boost to maintain adequate breathing.  Special kinds of BPAP are used in patients who have inadequate breathing from lung or heart disease. In most cases, the device is  smart  and can slowly self-adjusts to assist breathing. Like CPAP, the device keeps a record of how well you are treated.  Your mask is your connection to the device. You get to  choose what feels most comfortable and the staff will help to make sure if fits. Here: are some examples of the different masks that are available:       Key points to remember on your journey with sleep apnea:  Sleep study.  PAP devices often need to be adjusted during a sleep study to show that they are effective and adjusted right.  Good tips to remember: Try wearing just the mask during a quiet time during the day so your body adapts to wearing it. A humidifier is recommended for comfort in most cases to prevent drying of your nose and throat. Allergy medication from your provider may help you if you are having nasal congestion.  Getting settled-in. It takes more than one night for most of us to get used to wearing a mask. Try wearing just the mask during a quiet time during the day so your body adapts to wearing it. A humidifier is recommended for comfort in most cases. Our team will work with you carefully on the first day and will be in contact within 4 days and again at 2 and 4 weeks for advice and remote device adjustments. Your therapy is evaluated by the device each day.   Use it every night. The more you are able to sleep naturally for 7-8 hours, the more likely you will have good sleep and to prevent health risks or symptoms from sleep apnea. Even if you use it 4 hours it helps. Occasionally all of us are unable to use a medical therapy, in sleep apnea, it is not dangerous to miss one night.   Communicate. Call our skilled team on the number provided on the first day if your visit for problems that make it difficult to wear the device. Over 2 out of 3 patients can learn to wear the device long-term with help from our team. Remember to call our team or your sleep providers if you are unable to wear the device as we may have other solutions for those who cannot adapt to mask CPAP therapy. It is recommended that you sleep your sleep provider within the first 3 months and yearly after that if you are not  having problems.   Take care of your equipment. Make sure you clean your mask and tubing using directions every day and that your filter and mask are replaced as recommended or if they are not working.     BESIDES CPAP, WHAT OTHER THERAPIES ARE THERE?    Positioning Device  Positioning devices are generally used when sleep apnea is mild and only occurs on your back.This example shows a pillow that straps around the waist. It may be appropriate for those whose sleep study shows milder sleep apnea that occurs primarily when lying flat on one's back. Preliminary studies have shown benefit but effectiveness at home may need to be verified by a home sleep test. These devices are generally not covered by medical insurance.    Oral Appliance  What is oral appliance therapy?  An oral appliance device fits on your teeth at night like a retainer used after having braces. The device is made by a specialized dentist and requires several visits over 1-2 months before a manufactured device is made to fit your teeth and is adjusted to prevent your sleep apnea. Once an oral device is working properly, snoring should be improved. A home sleep test may be recommended at that time if to determine whether the sleep apnea is adequately treated.       Some things to remember:  -Oral devices are often, but not always, covered by your medical insurance. Be sure to check with your insurance provider.   -If you are referred for oral therapy, you will be given a list of specialized dentists to consider or you may choose to visit the Web site of the American Academy of Dental Sleep Medicine  -Oral devices are less likely to work if you have severe sleep apnea or are extremely overweight.     More detailed information  An oral appliance is a small acrylic device that fits over the upper and lower teeth  (similar to a retainer or a mouth guard). This device slightly moves jaw forward, which moves the base of the tongue forward, opens the airway,  improves breathing for effective treat snoring and obstructive sleep apnea in perhaps 7 out of 10 people .  The best working devices are custom-made by a dental device  after a mold is made of the teeth 1, 2, 3.  When is an oral appliance indicated?  Oral appliance therapy is recommended as a first-line treatment for patients with primary snoring, mild sleep apnea, and for patients with moderate sleep apnea who prefer appliance therapy to use of CPAP4, 5. Severity of sleep apnea is determined by sleep testing and is based on the number of respiratory events per hour of sleep.   How successful is oral appliance therapy?  The success rate of oral appliance therapy in patients with mild sleep apnea is 75-80% while in patients with moderate sleep apnea it is 50-70%. The chance of success in patients with severe sleep apnea is 40-50%. The research also shows that oral appliances have a beneficial effect on the cardiovascular health of EDUARDO patients at the same magnitude as CPAP therapy7.  Oral appliances should be a second-line treatment in cases of severe sleep apnea, but if not completely successful then a combination therapy utilizing CPAP plus oral appliance therapy may be effective. Oral appliances tend to be effective in a broad range of patients although studies show that the patients who have the highest success are females, younger patients, those with milder disease, and less severe obesity. 3, 6.   Finding a dentist that practices dental sleep medicine  Specific training is available through the American Academy of Dental Sleep Medicine for dentists interested in working in the field of sleep. To find a dentist who is educated in the field of sleep and the use of oral appliances, near you, visit the Web site of the American Academy of Dental Sleep Medicine.    References  1. mo Rios al. Objectively measured vs self-reported compliance during oral appliance therapy for sleep-disordered  breathing. Chest 2013; 144(5): 1617-2537.  2. Travis, et al. Objective measurement of compliance during oral appliance therapy for sleep-disordered breathing. Thorax 2013; 68(1): 91-96.  3. Sanjay et al. Mandibular advancement devices in 620 men and women with EDUARDO and snoring: tolerability and predictors of treatment success. Chest 2004; 125: 2745-5123.  4. Davina et al. Oral appliances for snoring and EDUARDO: a review. Sleep 2006; 29: 244-262.  5. Arnie et al. Oral appliance treatment for EDUARDO: an update. J Clin Sleep Med 2014; 10(2): 215-227.  6. Sirena et al. Predictors of OSAH treatment outcome. J Dent Res 2007; 86: 9317-9672.      Weight Loss:    Weight loss is a long-term strategy that may improve sleep apnea in some patients.    Weight management is a personal decision.  If you are interested in exploring weight loss strategies, the following discussion covers the impact on weight loss on sleep apnea and the approaches that may be successful.    Weight loss decreases severity of sleep apnea in most people with obesity. For those with mild obesity who have developed snoring with weight gain, even 15-30 pound weight loss can improve and occasionally eliminate sleep apnea.  Structured and life-long dietary and health habits are necessary to lose weight and keep healthier weight levels.     Though there may be significant health benefits from weight loss, long-term weight loss is very difficult to achieve- studies show success with dietary management in less than 10% of people. In addition, substantial weight loss may require years of dietary control and may be difficult if patients have severe obesity. In these cases, surgical management may be considered.  Finally, older individuals who have tolerated obesity without health complications may be less likely to benefit from weight loss strategies.    Your BMI is Body mass index is 30.71 kg/m .    Weight management plan: Discussed healthy diet and  exercise guidelines    Surgery:    Surgery for obstructive sleep apnea is considered generally only when other therapies fail to work. Surgery may be discussed with you if you are having a difficult time tolerating CPAP and or when there is an abnormal structure that requires surgical correction.  Nose and throat surgeries often enlarge the airway to prevent collapse.  Most of these surgeries create pain for 1-2 weeks and up to half of the most common surgeries are not effective throughout life.  You should carefully discuss the benefits and drawbacks to surgery with your sleep provider and surgeon to determine if it is the best solution for you.   More information  Surgery for EDUARDO is directed at areas that are responsible for narrowing or complete obstruction of the airway during sleep.  There are a wide range of procedures available to enlarge and/or stabilize the airway to prevent blockage of breathing in the three major areas where it can occur: the palate, tongue, and nasal regions.  Successful surgical treatment depends on the accurate identification of the factors responsible for obstructive sleep apnea in each person.  A personalized approach is required because there is no single treatment that works well for everyone.  Because of anatomic variation, consultation with an examination by a sleep surgeon is a critical first step in determining what surgical options are best for each patient.  In some cases, examination during sedation may be recommended in order to guide the selection of procedures.  Patients will be counseled about risks and benefits as well as the typical recovery course after surgery. Surgery is typically not a cure for a person s EDUARDO.  However, surgery will often significantly improve one s EDUARDO severity (termed  success rate ).  Even in the absence of a cure, surgery will decrease the cardiovascular risk associated with OSA7; improve overall quality of life8 (sleepiness, functionality,  sleep quality, etc).      Palate Procedures:  Patients with EDUARDO often have narrowing of their airway in the region of their tonsils and uvula.  The goals of palate procedures are to widen the airway in this region as well as to help the tissues resist collapse.  Modern palate procedure techniques focus on tissue conservation and soft tissue rearrangement, rather than tissue removal.  Often the uvula is preserved in this procedure. Residual sleep apnea is common in patient after pharyngoplasty with an average reduction in sleep apnea events of 33%2.      Tongue Procedures:  ExamWhile patients are awake, the muscles that surround the throat are active and keep this region open for breathing. These muscles relax during sleep, allowing the tongue and other structures to collapse and block breathing.  There are several different tongue procedures available.  Selection of a tongue base procedure depends on characteristics seen on physical exam.  Generally, procedures are aimed at removing bulky tissues in this area or preventing the back of the tongue from falling back during sleep.  Success rates for tongue surgery range from 50-62%3.    Hypoglossal Nerve Stimulation:  Hypoglossal nerve stimulation has recently received approval from the [place] Food and Drug Administration for the treatment of obstructive sleep apnea.  This is based on research showing that the system was safe and effective in treating sleep apnea6.  Results showed that the median AHI score decreased 68%, from 29.3 to 9.0. This therapy uses an implant system that senses breathing patterns and delivers mild stimulation to airway muscles, which keeps the airway open during sleep.  The system consists of three fully implanted components: a small generator (similar in size to a pacemaker), a breathing sensor, and a stimulation lead.  Using a small handheld remote, a patient turns the therapy on before bed and off upon awakening.    Candidates for this  device must be greater than 22 years of age, have moderate to severe EDUARDO (AHI between 20-65), BMI less than 32, have tried CPAP/oral appliance without success, and have appropriate upper airway anatomy (determined by a sleep endoscopy performed by Dr. Patel).    Hypoglossal Nerve Stimulation Pathway:    The sleep surgeon s office will work with the patient through the insurance prior-authorization process (including communications and appeals).    Nasal Procedures:  Nasal obstruction can interfere with nasal breathing during the day and night.  Studies have shown that relief of nasal obstruction can improve the ability of some patients to tolerate positive airway pressure therapy for obstructive sleep apnea1.  Treatment options include medications such as nasal saline, topical corticosteroid and antihistamine sprays, and oral medications such as antihistamines or decongestants. Non-surgical treatments can include external nasal dilators for selected patients. If these are not successful by themselves, surgery can improve the nasal airway either alone or in combination with these other options.      Combination Procedures:  Combination of surgical procedures and other treatments may be recommended, particularly if patients have more than one area of narrowing or persistent positional disease.  The success rate of combination surgery ranges from 66-80%2,3.    References  Eyal MCGHEE. The Role of the Nose in Snoring and Obstructive Sleep Apnoea: An Update.  Eur Arch Otorhinolaryngol. 2011; 268: 1365-73.   Adriana SM; Keshav JA; Rosario JR; Pallanch JF; Alley MB; Moe SG; Gilbert RAYMUNDO. Surgical modifications of the upper airway for obstructive sleep apnea in adults: a systematic review and meta-analysis. SLEEP 2010;33(10):0957-6348. Luisa TAM. Hypopharyngeal surgery in obstructive sleep apnea: an evidence-based medicine review.  Arch Otolaryngol Head Neck Surg. 2006 Feb;132(2):206-13.  Mello YH1, Nelsy Y, Red DAVIDE. The  efficacy of anatomically based multilevel surgery for obstructive sleep apnea. Otolaryngol Head Neck Surg. 2003 Oct;129(4):327-35.  Luisa TAM, Goldberg A. Hypopharyngeal Surgery in Obstructive Sleep Apnea: An Evidence-Based Medicine Review. Arch Otolaryngol Head Neck Surg. 2006 Feb;132(2):206-13.  Prakash PJ et al. Upper-Airway Stimulation for Obstructive Sleep Apnea.  N Engl J Med. 2014 Jan 9;370(2):139-49.  Mynor Y et al. Increased Incidence of Cardiovascular Disease in Middle-aged Men with Obstructive Sleep Apnea. Am J Respir Crit Care Med; 2002 166: 159-165  Sandoval EM et al. Studying Life Effects and Effectiveness of Palatopharyngoplasty (SLEEP) study: Subjective Outcomes of Isolated Uvulopalatopharyngoplasty. Otolaryngol Head Neck Surg. 2011; 144: 623-631.

## 2023-12-18 NOTE — PROGRESS NOTES
[facility]  Consultation Note    Name: Dinesh Farfan MRN#: [id number]   Age: 58 year old YOB: 1965     Date of Consultation: 12/18/23  Consultation is requested by: Neeru Coffey  Primary care provider: Gian Hoffman DO    History of Present Illness:   Dniesh Farfan is a 58 year old male patient with HLD, Hx of L MCA CVA, COPD, ESTEVAN, BPH, and Hx of colon cancer  He is sent by Neeru Coffey for a sleep consultation regarding Consult For and Sleep Problem  .    Dinesh Farfan main reason for visit: wife says i stop breathing sometimes  Patient states problem(s) started: i dont know  Dinesh Farfan's goals for this visit: i dont know    He has not had a previous sleep study.    CPAP: No    Assessment and Plan:     Problem List Items Addressed This Visit          Nervous and Auditory    Cerebrovascular accident (CVA), unspecified mechanism (H)       Respiratory    Obstructive sleep apnea - Primary     STOP BANG score is 5/8. Patient likely has sleep apnea based on clinical history of Snoring, witnessed apnea, age, neck circumference, gender, and nocturia.   Patient denies EDS(ESS 7/24) and insomnia(PETER 10/28)  Obstructive sleep apnea reviewed. Complications of Untreated Sleep Apnea Reviewed.  HST/ Polysomnography reviewed. Information provided regarding treatment options for EDUARDO.  Recommend in-lab sleep study to evaluate for EDUARDO.            Relevant Orders    Comprehensive Sleep Study       SLEEP-WAKE SCHEDULE:   Work/School Days: Patient goes to school/work: No   Usually gets into bed at 10-11pm  Takes patient about 30-1hour to fall asleep  Has trouble falling asleep 1maybe nights per week  Wakes up in the middle of the night 1time times.  Wakes up due to Use the bathroom  He has trouble falling back asleep 0 times a week.   It usually takes 1min to get back to sleep  Patient is usually up at 6:30-8am  Uses alarm: No  Sleep Inertia: No    Weekends/Non-work Days/All Other  Days:  Usually gets into bed at 10-11pm   Takes patient about 30-1hr to fall asleep  Patient is usually up at 6-8am  Uses alarm: No    Sleep Need  Patient gets  8hr. sleep on average   Patient thinks He needs about 8hr. sleep    Dinesh Farfan prefers to sleep in this position(s): Back;Side   Patient states they do the following activities in bed: Watch TV    Naps  Patient takes a purposeful nap maybe 1-2time a week times a week and naps are usually 1-2hr. in duration  He feels better after a nap: Yes  He dozes off unintentionally 0 days per week  Patient has had a driving accident or near-miss due to sleepiness/drowsiness: No      SLEEP DISRUPTIONS:  Breathing/Snoring  Patient snores:Yes  Other people complain about His snoring: Yes  Patient has been told He stops breathing in His sleep:Yes  He has issues with the following: Getting up to urinate more than once    Movement:  Patient gets pain, discomfort, with an urge to move:  Yes  It happens when He is resting:  Yes  It happens more at night:  No  Patient has been told Dinesh Farfan kicks His legs at night:  No     Behaviors in Sleep:  Dinesh Farfan has experienced the following behaviors while sleeping: Eating  He has experienced sudden muscle weakness during the day: Yes       Is there anything else you would like your sleep provider to know: not sure      CAFFEINE AND OTHER SUBSTANCES:  Patient consumes caffeinated beverages per day:  1-2soda  Last caffeine use is usually: 3-7pm  List of any prescribed or over the counter stimulants that patient takes: none  List of any prescribed or over the counter sleep medication patient takes: for got name  List of previous sleep medications that patient has tried: for got name  Patient drinks alcohol to help them sleep: No  Patient drinks alcohol near bedtime: No    Family History:  Patient has a family member been diagnosed with a sleep disorder: No            SCALES:  EPWORTH SLEEPINESS SCALE        12/18/2023    11:21 AM    [place] Sleepiness Scale ( MILTON Ford  [id number]<br>ESS - [place]/English - Final version - 21 Nov 07 - [facility] [place].)   Sitting and reading Moderate chance of dozing   Watching TV Moderate chance of dozing   Sitting, inactive in a public place (e.g. a theatre or a meeting) Would never doze   As a passenger in a car for an hour without a break Would never doze   Lying down to rest in the afternoon when circumstances permit Moderate chance of dozing   Sitting and talking to someone Would never doze   Sitting quietly after a lunch without alcohol Slight chance of dozing   In a car, while stopped for a few minutes in traffic Would never doze   [place] Score (MC) 7   [place] Score (Sleep) 7       INSOMNIA SEVERITY INDEX (PETER)        12/18/2023    10:48 AM   Insomnia Severity Index (PETER)   Difficulty falling asleep 2   Difficulty staying asleep 2   Problems waking up too early 1   How SATISFIED/DISSATISFIED are you with your CURRENT sleep pattern? 1   How NOTICEABLE to others do you think your sleep problem is in terms of impairing the quality of your life? 2   How WORRIED/DISTRESSED are you about your current sleep problem? 1   To what extent do you consider your sleep problem to INTERFERE with your daily functioning (e.g. daytime fatigue, mood, ability to function at work/daily chores, concentration, memory, mood, etc.) CURRENTLY? 1   PETER Total Score 10    Guidelines for Scoring/Interpretation:  Total score categories:  0-7 = No clinically significant insomnia   8-14 = Subthreshold insomnia   15-21 = Clinical insomnia (moderate severity)  22-28 = Clinical insomnia (severe)  Used via courtesy of www.Stackifyealth.va.gov with permission from Mickey Marte PhD., [facility]      STOP BANG   EDUARDO High Risk            Total Score: 5    EDUARDO: Snores loudly    EDUARDO: Observed stopped breathing    EDUARDO: Over 50 ys old    EDUARDO: Neck Circum >16 in    EDUARDO: Male          GAD7       No data to display  "                 CAGE-AID       No data to display              CAGE-AID reprinted with permission from the [place] Medical Journal, CRISTINA Rosenberg. and ANIYAH Seo, \"Conjoint screening questionnaires for alcohol and drug abuse\" [place] Medical Journal 94: 135-140, 1995.      PATIENT HEALTH QUESTIONNAIRE-9 (PHQ - 9)       No data to display              Developed by Hope Rueda, Karina Almaraz, Tre Ambrocio and colleagues, with an educational lorenzo from [employer] No permission required to reproduce, translate, display or distribute.      Allergies:    Allergies   Allergen Reactions    Morphine Unknown     Takes hydrocodone without problem.       Medications:    Current Outpatient Medications   Medication Sig Dispense Refill    albuterol (PROAIR HFA/PROVENTIL HFA/VENTOLIN HFA) 108 (90 Base) MCG/ACT inhaler Inhale 2 puffs into the lungs every 6 hours as needed for shortness of breath, wheezing or cough      aspirin (ASA) 325 MG EC tablet Take 1 tablet (325 mg) by mouth daily 90 tablet 3    atorvastatin (LIPITOR) 80 MG tablet Take 1 tablet (80 mg) by mouth every evening 90 tablet 0    chlorhexidine (PERIDEX) 0.12 % solution       DULoxetine (CYMBALTA) 60 MG capsule Take 60 mg by mouth daily      hydrOXYzine (ATARAX) 25 MG tablet Take 25 mg by mouth 3 times daily as needed for anxiety      hydrOXYzine alyssa (VISTARIL) 25 MG capsule       NARCAN 4 MG/0.1ML nasal spray USE AS NEEDED FOR OPIOID OVERDOSE PER INSTRUCTION      nicotine (COMMIT) 2 MG lozenge Place 1 lozenge (2 mg) inside cheek every hour as needed for other (nicotine withdrawal symptoms) 72 lozenge 0    nicotine (NICODERM CQ) 21 MG/24HR 24 hr patch Place 1 patch onto the skin every 24 hours 28 patch 0    pregabalin (LYRICA) 100 MG capsule Take 1 capsule by mouth 3 times daily      QUEtiapine (SEROQUEL) 50 MG tablet Take  mg by mouth At Bedtime      tadalafil (CIALIS) 10 MG tablet Take 10 mg by mouth      terazosin (HYTRIN) 10 MG capsule " Take 10 mg by mouth daily      traZODone (DESYREL) 50 MG tablet Take  mg by mouth nightly as needed for sleep      clopidogrel (PLAVIX) 75 MG tablet 1 tablet (75 mg) by Oral or NG Tube route daily (Patient not taking: Reported on 12/13/2023) 90 tablet 0    HYDROcodone-acetaminophen (NORCO)  MG per tablet TAKE 1 TABLET BY MOUTH FOUR TIMES DAILY FOR MODERATE-SEVERE PAIN AS NEEDED (Patient not taking: Reported on 12/18/2023)      ibuprofen (ADVIL/MOTRIN) 800 MG tablet Take 800 mg by mouth every 8 hours as needed for moderate pain      ipratropium-albuterol (COMBIVENT RESPIMAT)  MCG/ACT inhaler Inhale 1 puff into the lungs 4 times daily for 30 days 1 each 0    predniSONE (DELTASONE) 10 MG tablet 4 tabs daily for 2 days, then 3 tabs daily for 2 days, then 2 tabs daily for 2 days, then 1 tab daily for 2 days, then stop (Patient not taking: Reported on 12/18/2023) 20 tablet 0       Problem List:  Patient Active Problem List    Diagnosis Date Noted    Obstructive sleep apnea 12/18/2023     Priority: Medium    Hypoxia 11/11/2023     Priority: Medium    History of tobacco abuse 11/11/2023     Priority: Medium    Acute bronchitis, unspecified organism 11/11/2023     Priority: Medium    Carotid artery occlusion with cerebral infarction (H) 06/11/2023     Priority: Medium    Occlusion of left carotid artery 06/11/2023     Priority: Medium    Cerebrovascular accident (CVA), unspecified mechanism (H) 06/11/2023     Priority: Medium    Stroke (H) 06/11/2023     Priority: Medium        Past Medical/Surgical History:  Past Medical History:   Diagnosis Date    Herniated lumbar disc without myelopathy      No past surgical history on file.    Social History:  Social History     Socioeconomic History    Marital status:      Spouse name: Not on file    Number of children: Not on file    Years of education: Not on file    Highest education level: Not on file   Occupational History    Not on file   Tobacco Use     "Smoking status: Every Day     Packs/day: 1.00     Years: 0.00     Additional pack years: 0.00     Total pack years: 0.00     Types: Cigarettes    Smokeless tobacco: Not on file   Substance and Sexual Activity    Alcohol use: Yes     Comment: rarely    Drug use: No    Sexual activity: Yes     Partners: Female   Other Topics Concern    Parent/sibling w/ CABG, MI or angioplasty before 65F 55M? No   Social History Narrative    Not on file     Social Determinants of Health     Financial Resource Strain: Not on file   Food Insecurity: Not on file   Transportation Needs: Not on file   Physical Activity: Not on file   Stress: Not on file   Social Connections: Not on file   Interpersonal Safety: Not on file   Housing Stability: Not on file       Family History:  Family History   Problem Relation Age of Onset    Family History Negative Mother     Family History Negative Father        Review of Systems:   A complete review of systems reviewed by me is negative with the exeption of what has been mentioned in the history of present illness.  In the last TWO WEEKS have you experienced any of the following symptoms?  Fevers: No  Night Sweats: No  Weight Gain: Yes  Pain at Night: Yes  Double Vision: No  Changes in Vision: Yes  Difficulty Breathing through Nose: No  Sore Throat in Morning: No  Dry Mouth in the Morning: No  Shortness of Breath Lying Flat: No  Shortness of Breath With Activity: Yes  Awakening with Shortness of Breath: No  Increased Cough: No  Heart Racing at Night: No  Swelling in Feet or Legs: No  Diarrhea at Night: No  Heartburn at Night: No  Urinating More than Once at Night: Yes  Losing Control of Urine at Night: No  Joint Pains at Night: Yes  Headaches in Morning: No  Weakness in Arms or Legs: Yes  Depressed Mood: No  Anxiety: Yes     Physical Exam:   Vitals: /82   Pulse 64   Resp 20   Ht 1.727 m (5' 8\")   Wt 91.6 kg (202 lb)   SpO2 94%   BMI 30.71 kg/m    BMI= Body mass index is 30.71 kg/m .  Neck " "Cir (cm): 45 cm  GENERAL: Healthy, alert and no distress  EYES: Eyes grossly normal to inspection.  No discharge or erythema, or obvious scleral/conjunctival abnormalities.  RESP: No audible wheeze, cough, or visible cyanosis.  No visible retractions or increased work of breathing.    SKIN: Visible skin clear. No significant rash, abnormal pigmentation or lesions.  NEURO: Cranial nerves grossly intact.  Mentation and speech appropriate for age.  PSYCH: Mentation appears normal, affect normal/bright, judgement and insight intact, normal speech and appearance well-groomed.         Data: All pertinent previous laboratory data reviewed     Recent Labs   Lab Test 11/12/23  0832 11/11/23  1320    139   POTASSIUM 3.9 4.2   CHLORIDE 103 102   CO2 27 28   ANIONGAP 8 9   * 103*   BUN 17.5 20.4*   CR 0.69 0.87   DUSTIN 8.8 9.4       Recent Labs   Lab Test 11/12/23  0832   WBC 14.4*   RBC 4.28*   HGB 13.4   HCT 39.1*   MCV 91   MCH 31.3   MCHC 34.3   RDW 13.4          No results for input(s): \"PROTTOTAL\", \"ALBUMIN\", \"BILITOTAL\", \"ALKPHOS\", \"AST\", \"ALT\", \"BILIDIRECT\" in the last 66579 hours.    No results found for: \"TSH\"    No results found for: \"UAMP\", \"UBARB\", \"BENZODIAZEUR\", \"UCANN\", \"UCOC\", \"OPIT\", \"UPCP\"    No results found for: \"IRONSAT\", \"DM06078\", \"STEFANIE\"    No results found for: \"PH\", \"PHARTERIAL\", \"PO2\", \"[id number]\", \"SAT\", \"PCO2\", \"HCO3\", \"BASEEXCESS\", \"ZAKI\", \"BEB\"    @LABRCNTIPR(phv:4,pco2v:4,po2v:4,hco3v:4,john:4,o2per:4)@    Echocardiology: No results found for this or any previous visit (from the past 4320 hour(s)).    Chest x-ray:   XR CHEST 2 VIEWS 11/11/2023    Narrative  EXAM: XR CHEST 2 VIEWS  LOCATION: [facility]  DATE: 11/11/2023    INDICATION: coug, SOB  COMPARISON: CT 6/13/2023    Impression  IMPRESSION: Negative chest.      Chest CT:   CT CHEST PULMONARY EMBOLISM W CONTRAST 06/13/2023    Narrative  CT CHEST PULMONARY EMBOLISM WITH CONTRAST  6/13/2023 10:53 " AM    HISTORY: Chest pain, evaluate PE, Dyspnea, hypotensive; Male sex; No  imaging to rule out PE in the last 24 hours; Pulmonary embolism  rule-out criteria (PERC) score > 0; Revised Goldsmith Score (RGS) not >=  11; No D-dimer result available; D-dimer not ordered.    TECHNIQUE: Scans obtained from the apices through the diaphragm with  IV contrast. 75mL Isovue-370 IV injected. Radiation dose for this scan  was reduced using automated exposure control, adjustment of the mA  and/or kV according to patient size, or iterative reconstruction  technique. 2D and 3D MIP reconstructions were performed by the CT  technologist    COMPARISON:  None available.    FINDINGS:  Chest/mediastinum: Small apparent filling defect within a right lower  lobe segmental pulmonary artery (series 6 image 190), likely  artifactual related to motion/respiratory artifact No cardiomegaly or  significant pericardial effusion. No significant mediastinal or hilar  lymphadenopathy. Mild atherosclerotic vascular calcification of the  coronary arteries.    Lungs and pleura: No pleural effusion or pneumothorax. Basilar  predominant, right more than left areas of bronchiolar mucoid  impaction, with associated pulmonary opacities, likely infectious.    Upper abdomen: Limited evaluation of the upper abdomen due to lack of  coverage and timing of contrast.    Bones and soft tissue: No suspicious osseous lesion.    Impression  IMPRESSION:  1. Apparent small filling defect within one of the right lower lobe  segmental pulmonary arteries, likely artifactual related to  motion/respiratory artifact versus less likely small pulmonary  embolus. No evidence for right heart strain.  2. Basilar predominant right more than left, areas of bronchiolar  mucoid impaction, with associated pulmonary opacities, likely  infectious.    SILVINA BATISTA MD      SYSTEM ID:  [id number]      PFT: Most Recent [place]ze Pulmonary Function Testing  No results found     Patient was  strongly advised to avoid driving, operating any heavy machinery or other hazardous situations while drowsy or sleepy.  Patient was counseled on the importance of driving while alert, to pull over if drowsy, or nap before getting into the vehicle if sleepy.      Plan is for Dinesh Farfan to follow up in about 2 week(s) following PSG.     The above note was dictated using voice recognition software. Although reviewed after completion, some word and grammatical error may remain . Please contact the author for any clarifications.    Total time spent reviewing medical records, history and physical examination, review of previous testing and interpretation as well as documentation on this date, 12/18/23: 21 minutes    Josesito Clayton MD on 10/20/2022   [facility]   Floor 1, [address]   [address] [place] [address]   Appointments: [phone] [facility]   Floor 1, [address]   [address] [place] [address]   Appointments: [phone]     CC: Neeru Coffey

## 2023-12-18 NOTE — NURSING NOTE
Sleep study and return visit has been schedule. AVS printed and Sleep study  Packet/letter given to patient.      Camilo Tejeda MA  [facility]

## 2023-12-18 NOTE — ASSESSMENT & PLAN NOTE
STOP BANG score is 5/8. Patient likely has sleep apnea based on clinical history of Snoring, witnessed apnea, age, neck circumference, gender, and nocturia.   Patient denies EDS(ESS 7/24) and insomnia(PETER 10/28)  Obstructive sleep apnea reviewed. Complications of Untreated Sleep Apnea Reviewed.  HST/ Polysomnography reviewed. Information provided regarding treatment options for EDUARDO.  Recommend in-lab sleep study to evaluate for EDUARDO.

## 2024-01-08 ENCOUNTER — TELEPHONE (OUTPATIENT)
Dept: OTHER | Facility: CLINIC | Age: 59
End: 2024-01-08
Payer: COMMERCIAL

## 2024-01-08 NOTE — TELEPHONE ENCOUNTER
Per chart review patient saw neurology Neeru Coffey NP on 12/13/23 and was referred to neurosurgery on 12/13/23.  Neurosurgery Referral  Where: [facility] [place]  Address: [address] [address]  [address]  Phone: [phone]    No referral has been received at [facility].    Patient needs to contact Neeru Coffey about his plan of care and next steps.  [facility] [place] [phone]      I called patient and provided information above.    Gilda VERDUGO, RN    [facility]  Office: [phone]  Fax: [phone]

## 2024-01-08 NOTE — TELEPHONE ENCOUNTER
[facility]    Who is the name of the provider?:  [facility] NURSE   What is the location you see this provider at/preferred location?: [place]  Person calling / Facility: Dinesh Farfan  Phone number:  [phone]   Nurse call back needed:  ?     Reason for call:  Patient describes carotid stenosis, stroke in June, also has an appointment in June and is unsure where he needs to be seen next.     Was transferred to [facility] by central scheduling    Please advise if patient is needing to see [facility]    Pharmacy location:     [facility] [id number] [place] [facility] [address] [place], [place] - [address] [facility]  Outside Imaging: n/a   Can we leave a detailed message on this number?  YES     1/8/2024, 12:53 PM

## 2024-01-28 NOTE — PROGRESS NOTES
Dinesh Farfan is a 58 year old male with the following diagnoses:   1. Cerebrovascular accident (CVA), unspecified mechanism (H)    2. Subjective visual disturbance         Patient was sent for consultation by Dr. Coffey for Stroke L MCA     HPI:    Patient has had two strokes one in 2023. Per chart review patient had a L MCA stroke. Since the stroke patient has noticed that if he looking for a significant length of time he starts to get tunnel vision. Denies double vision. He also says that all of his vision is blurry since he had his stroke there is no particular side or area that seems to affected. Patient also mentions he has bilateral retrobulbar pain that worsens when he looks up. He has had the eye pain ever since he had the stroke. Patient also endorses left sided on top of his head headache that initially right after the stroke he was getting every day but now it's gotten better to the point he only gets the 1-2 times a week. Reading is not affected. He is driving.  No accidents.  Not bumping into things.     Independent historians:  Patient    Review of outside testin/13/23 MRI Brain WWO:  IMPRESSION:   1.  Evolving areas of recent infarct in the anterior left middle  cerebral artery territory affecting the frontal lobe and insula. The  areas of ischemic tissue are slightly more confluent but overall  unchanged in size.  2.  No area of new infarct.  3.  No hemorrhage.    23 CTA Head/Neck  IMPRESSION:  HEAD CTA:  1.  Decreased filling of anterior division M2 middle cerebral artery  branches on the left versus 2023 consistent with vessel  occlusion. This generally corresponds to the area of infarct  identified on CT and MRI.  2.  The more proximal M1 segment of the left middle cerebral artery  and posterior division M2 branches appear patent.  3.  The proximal intracranial segment of the left internal carotid  artery remains occluded until the level of the clinoid  process,  possibly slightly progressed.     NECK CTA:  1.  Unchanged occlusion of the left common and internal carotid  arteries. Filling of the external branches is presumably via  collaterals.  2.  Mild right carotid artery atherosclerosis without significant  narrowing.  3.  Moderate to severe narrowing at the origin of the right vertebral  artery unchanged. No dissection.    6/11/23 CTA Head/Neck:  IMPRESSION:      HEAD CTA:   1.  Complete occlusion of the left internal carotid artery with a fairly robust collateral reperfusion to the left cerebral hemisphere.     2.  Asymmetric posterior division MCA branch on left raising the possibility of ostial obstruction.     NECK CTA:  1.  Occlusion of the proximal left common carotid artery. Meniscus is present raising concern for intraluminal thrombus.    6/11/23 MRI Brain WWO:  MPRESSION:  1.  Acute/subacute left MCA territory ischemic injury without hemorrhagic conversion.    My interpretation performed today of outside testing:  I have independently reviewed MRI brain  performed 6/13/23. I agree there are signs of left MCA stroke. No signs of optic neuritis or perineuritis that would be a cause for eye pain.         Review of outside clinical notes:    12/13/23 -- Visit with Dr. Coffey  Assessment:      ICD-10-CM     1. Cerebrovascular accident (CVA), unspecified mechanism (H)  I63.9 Stroke Hospital Follow Up       2. Cerebral infarction due to occlusion of left carotid artery (H)  I63.232 Stroke Hospital Follow Up             Dinesh Farfan is a 58 year old male presenting for follow-up for R MCA territory infarct. He was hospitalized at [facility] on 06/11/23 - 06/12/23 and 06/13/23 - 06/16/23. Prior to the hospital stay, he had a past medical history of tobacco use and colon cancer s/p chemotherapy. He was admitted to [facility] 6/11-6/12 for L MCA stroke in the setting of chronic appearing L CCA/ICA occlusion. He returned to the ED 6/13/23 for  evaluation of worsening left facial weakness, worsening aphasia, RUE weakness.  Patient is close to baseline.  Dysarthria as 70 to 80% back to baseline.  We discussed interventions outlined below and a plan to see the patient back in 6 months to establish care with a neurologist.  Dinesh understands and agrees with this plan     Plan:      Symptom management  -Ophthalmologist referral- vision changes post stroke, blurred vision.     Diagnostic workup  - Cardiac event monitor to rule out atrial fibrillation      Secondary prevention  - Continue  mg daily      Lifestyle  - Mediterranean diet  - Exercise     Risk Factors   Elevated cholesterol levels   LDL: 92  - Goal < 70 mg/dl  - Continue preventive therapy: Atorvastatin as prescribed  - Follow with primary care provider to get lipid panel drawn      Prediabetes   A1c: 6.0%  - Goal: < 7.0%  - Continue preventive therapy: Healthy lifestyle modifications      Smoking  - Goal: Stop smoking  - Plan: Nicotine patch ordered.  Follow-up with your primary care provider to taper down on dose  Resources: [email or web address]      Obstructive sleep apnea  -Sleep study referral placed     --- Plan to follow-up with neurosurgery - referral placed  --- Plan to follow-up with your primary care provider to manage your vascular risk factors  --- Plan on follow up in the Neurology Clinic in 6 months.  --- Please feel free to reach out if you have any further questions or concerns.  --- Seek immediate medical attention if an emergency arises or if your health becomes progressively worse.      For any acute neurologic deficits he was advised to  go directly to the hospital rather than call the clinic.     Past medical history:    Patient Active Problem List   Diagnosis    Carotid artery occlusion with cerebral infarction (H)    Occlusion of left carotid artery    Cerebrovascular accident (CVA), unspecified mechanism (H)    Stroke (H)    Hypoxia    History of tobacco abuse     Acute bronchitis, unspecified organism    Obstructive sleep apnea         Medications:   albuterol  aspirin  atorvastatin  chlorhexidine  clopidogrel  DULoxetine  HYDROcodone-acetaminophen  hydrOXYzine HCl  hydrOXYzine alyssa  ibuprofen  Narcan Liqd  nicotine  predniSONE  pregabalin  QUEtiapine  tadalafil  terazosin  traZODone      Family history / social history:  Patient's family history includes Family History Negative in his father and mother.     Patient  reports that he has been smoking cigarettes. He has been smoking an average of 1 pack per day. He does not have any smokeless tobacco history on file. He reports current alcohol use. He reports that he does not use drugs.       Exam:  Visual acuity ph 20/20 right eye 20/25 left eye. Color vision full each eye. Pupils no rAPD. Anterior segment wnl for age. Posterior segment wnl for age. Strabismus N/A.       Tests ordered and interpreted today:   DMV VF: 140 degrees OS, 135 degrees OD          Discussion of management / interpretation with another provider:   None    Assessment/Plan:   It is my impression that patient has Left MCA stroke in 6/2023 that today patient was evaluated for VF defects in setting of driving requirements. Patient has adequate amount of horizontal visual field vision. Subjective decreased vision is improved with pinhole which suggests patient needs updated glasses. Current glasses are approx 4 years old. Patient has borderline qualification due to vision for legal driving. Discussed with patient that he needs to get updated glasses. Patient voiced understanding.     Patient has pain in both eyes with looking up.  He is tender over the trochlea both eyes.  We discussed that he could try prednisone, but he is not interested in this.  We also discussed that he could try a steroid injection to the trochlea in each eye and he wanted to pursue this.  After discussing the risks benefits alternatives and complications, he wished to proceed.  After  the injection he noted improvement in the pain upon upgaze.            Attending Physician Attestation:  Complete documentation of historical and exam elements from today's encounter can be found in the full encounter summary report (not reduplicated in this progress note).  I personally obtained the chief complaint(s) and history of present illness.  I confirmed and edited as necessary the review of systems, past medical/surgical history, family history, social history, and examination findings as documented by others; and I examined the patient myself.  I personally reviewed the relevant tests, images, and reports as documented above.  I formulated and edited as necessary the assessment and plan and discussed the findings and management plan with the patient and family. I personally reviewed the ophthalmic test(s) associated with this encounter, agree with the interpretation(s) as documented by the resident/fellow, and have edited the corresponding report(s) as necessary.  - Jackson Moon MD    The injections were given by me. No trainee participated in the injections.     Margareth Santiago MD   Fellow, Neuro-Ophthalmology

## 2024-01-31 ENCOUNTER — PRE VISIT (OUTPATIENT)
Dept: OPHTHALMOLOGY | Facility: CLINIC | Age: 59
End: 2024-01-31

## 2024-01-31 ENCOUNTER — OFFICE VISIT (OUTPATIENT)
Dept: OPHTHALMOLOGY | Facility: CLINIC | Age: 59
End: 2024-01-31
Payer: COMMERCIAL

## 2024-01-31 DIAGNOSIS — H05.10 TROCHLEITIS: ICD-10-CM

## 2024-01-31 DIAGNOSIS — I63.9 CEREBROVASCULAR ACCIDENT (CVA), UNSPECIFIED MECHANISM (H): Primary | ICD-10-CM

## 2024-01-31 DIAGNOSIS — H53.10 SUBJECTIVE VISUAL DISTURBANCE: Primary | ICD-10-CM

## 2024-01-31 DIAGNOSIS — H53.10 SUBJECTIVE VISUAL DISTURBANCE: ICD-10-CM

## 2024-01-31 PROCEDURE — 92081 LIMITED VISUAL FIELD XM: CPT | Mod: GC | Performed by: OPHTHALMOLOGY

## 2024-01-31 PROCEDURE — 99204 OFFICE O/P NEW MOD 45 MIN: CPT | Mod: 25 | Performed by: OPHTHALMOLOGY

## 2024-01-31 PROCEDURE — 67515 INJECT/TREAT EYE SOCKET: CPT | Mod: 50 | Performed by: OPHTHALMOLOGY

## 2024-01-31 RX ORDER — DEXAMETHASONE SODIUM PHOSPHATE 4 MG/ML
1 INJECTION, SOLUTION INTRA-ARTICULAR; INTRALESIONAL; INTRAMUSCULAR; INTRAVENOUS; SOFT TISSUE ONCE
Status: COMPLETED | OUTPATIENT
Start: 2024-01-31 | End: 2024-01-31

## 2024-01-31 RX ADMIN — DEXAMETHASONE SODIUM PHOSPHATE 4 MG: 4 INJECTION, SOLUTION INTRA-ARTICULAR; INTRALESIONAL; INTRAMUSCULAR; INTRAVENOUS; SOFT TISSUE at 10:34

## 2024-01-31 ASSESSMENT — CONF VISUAL FIELD
OD_SUPERIOR_NASAL_RESTRICTION: 0
OD_SUPERIOR_TEMPORAL_RESTRICTION: 0
OD_INFERIOR_NASAL_RESTRICTION: 0
METHOD: COUNTING FINGERS
OS_INFERIOR_TEMPORAL_RESTRICTION: 0
OD_NORMAL: 1
OD_INFERIOR_TEMPORAL_RESTRICTION: 0
OS_SUPERIOR_TEMPORAL_RESTRICTION: 0
OS_NORMAL: 1
OS_SUPERIOR_NASAL_RESTRICTION: 0
OS_INFERIOR_NASAL_RESTRICTION: 0

## 2024-01-31 ASSESSMENT — VISUAL ACUITY
OD_SC+: -1
OS_PH_SC+: +1
OD_PH_SC+: -1
OD_SC: 20/40
METHOD: SNELLEN - LINEAR
OS_SC: 20/60
OS_SC+: -2+2
OS_PH_SC: 20/25
OD_PH_SC: 20/20

## 2024-01-31 ASSESSMENT — CUP TO DISC RATIO
OS_RATIO: 0.25
OD_RATIO: 0.3

## 2024-01-31 ASSESSMENT — SLIT LAMP EXAM - LIDS
COMMENTS: NORMAL
COMMENTS: NORMAL

## 2024-01-31 ASSESSMENT — TONOMETRY
OD_IOP_MMHG: 13
IOP_METHOD: ICARE
OS_IOP_MMHG: 11

## 2024-01-31 ASSESSMENT — EXTERNAL EXAM - LEFT EYE: OS_EXAM: NORMAL

## 2024-01-31 ASSESSMENT — EXTERNAL EXAM - RIGHT EYE: OD_EXAM: NORMAL

## 2024-01-31 NOTE — LETTER
2024         RE:  :  MRN: Dinesh Farfan  1965  [id number]     Dear Dr. Coffey,    Thank you for asking me to see your very pleasant patient, Dinesh Farfan, in neuro-ophthalmic consultation.  I would like to thank you for sending your records and I have summarized them in the history of present illness.   My assessment and plan are below.  For further details, please see my attached clinic note.      Dinesh Farfan is a 58 year old male with the following diagnoses:   1. Cerebrovascular accident (CVA), unspecified mechanism (H)    2. Subjective visual disturbance         Patient was sent for consultation by Dr. Coffey for Stroke L MCA.    HPI:  Patient has had two strokes one in 2023. Per chart review patient had a L MCA stroke. Since the stroke patient has noticed that if he looking for a significant length of time he starts to get tunnel vision. Denies double vision. He also says that all of his vision is blurry since he had his stroke there is no particular side or area that seems to affected. Patient also mentions he has bilateral retrobulbar pain that worsens when he looks up. He has had the eye pain ever since he had the stroke. Patient also endorses left sided on top of his head headache that initially right after the stroke he was getting every day but now it's gotten better to the point he only gets the 1-2 times a week. Reading is not affected. He is driving.  No accidents.  Not bumping into things.     Independent historians: Patient    Review of outside testin/13/23 MRI Brain WWO:  IMPRESSION:   1.  Evolving areas of recent infarct in the anterior left middle  cerebral artery territory affecting the frontal lobe and insula. The  areas of ischemic tissue are slightly more confluent but overall  unchanged in size.  2.  No area of new infarct.  3.  No hemorrhage.  23 CTA Head/Neck  IMPRESSION:  HEAD CTA:  1.  Decreased filling of anterior division M2 middle cerebral  artery  branches on the left versus 6/11/2023 consistent with vessel  occlusion. This generally corresponds to the area of infarct  identified on CT and MRI.  2.  The more proximal M1 segment of the left middle cerebral artery  and posterior division M2 branches appear patent.  3.  The proximal intracranial segment of the left internal carotid  artery remains occluded until the level of the clinoid process,  possibly slightly progressed.     NECK CTA:  1.  Unchanged occlusion of the left common and internal carotid  arteries. Filling of the external branches is presumably via  collaterals.  2.  Mild right carotid artery atherosclerosis without significant  narrowing.  3.  Moderate to severe narrowing at the origin of the right vertebral  artery unchanged. No dissection.    6/11/23 CTA Head/Neck:  IMPRESSION:      HEAD CTA:   1.  Complete occlusion of the left internal carotid artery with a fairly robust collateral reperfusion to the left cerebral hemisphere.     2.  Asymmetric posterior division MCA branch on left raising the possibility of ostial obstruction.     NECK CTA:  1.  Occlusion of the proximal left common carotid artery. Meniscus is present raising concern for intraluminal thrombus.    6/11/23 MRI Brain WWO:  MPRESSION:  1.  Acute/subacute left MCA territory ischemic injury without hemorrhagic conversion.    My interpretation performed today of outside testing:  I have independently reviewed MRI brain  performed 6/13/23. I agree there are signs of left MCA stroke. No signs of optic neuritis or perineuritis that would be a cause for eye pain.     Review of outside clinical notes:    12/13/23 -- Visit with Dr. Coffey  Assessment:      ICD-10-CM     1. Cerebrovascular accident (CVA), unspecified mechanism (H)  I63.9 Stroke Hospital Follow Up       2. Cerebral infarction due to occlusion of left carotid artery (H)  I63.232 Stroke Hospital Follow Up             Dinesh MERRILL Delbertcarl is a 58 year old male  presenting for follow-up for R MCA territory infarct. He was hospitalized at [facility] on 06/11/23 - 06/12/23 and 06/13/23 - 06/16/23. Prior to the hospital stay, he had a past medical history of tobacco use and colon cancer s/p chemotherapy. He was admitted to [facility] 6/11-6/12 for L MCA stroke in the setting of chronic appearing L CCA/ICA occlusion. He returned to the ED 6/13/23 for evaluation of worsening left facial weakness, worsening aphasia, RUE weakness.  Patient is close to baseline.  Dysarthria as 70 to 80% back to baseline.  We discussed interventions outlined below and a plan to see the patient back in 6 months to establish care with a neurologist.  Dinesh understands and agrees with this plan     Plan:      Symptom management  -Ophthalmologist referral- vision changes post stroke, blurred vision.     Diagnostic workup  - Cardiac event monitor to rule out atrial fibrillation      Secondary prevention  - Continue  mg daily      Lifestyle  - Mediterranean diet  - Exercise     Risk Factors   Elevated cholesterol levels   LDL: 92  - Goal < 70 mg/dl  - Continue preventive therapy: Atorvastatin as prescribed  - Follow with primary care provider to get lipid panel drawn      Prediabetes   A1c: 6.0%  - Goal: < 7.0%  - Continue preventive therapy: Healthy lifestyle modifications      Smoking  - Goal: Stop smoking  - Plan: Nicotine patch ordered.  Follow-up with your primary care provider to taper down on dose  Resources: [email or web address].com      Obstructive sleep apnea  -Sleep study referral placed     --- Plan to follow-up with neurosurgery - referral placed  --- Plan to follow-up with your primary care provider to manage your vascular risk factors  --- Plan on follow up in the Neurology Clinic in 6 months.  --- Please feel free to reach out if you have any further questions or concerns.  --- Seek immediate medical attention if an emergency arises or if your health becomes progressively  worse.      For any acute neurologic deficits he was advised to  go directly to the hospital rather than call the clinic.     Past medical history:    Patient Active Problem List   Diagnosis    Carotid artery occlusion with cerebral infarction (H)    Occlusion of left carotid artery    Cerebrovascular accident (CVA), unspecified mechanism (H)    Stroke (H)    Hypoxia    History of tobacco abuse    Acute bronchitis, unspecified organism    Obstructive sleep apnea       Medications:   albuterol  aspirin  atorvastatin  chlorhexidine  clopidogrel  DULoxetine  HYDROcodone-acetaminophen  hydrOXYzine HCl  hydrOXYzine alyssa  ibuprofen  Narcan Liqd  nicotine  predniSONE  pregabalin  QUEtiapine  tadalafil  terazosin  traZODone    Family history / social history:  Patient's family history includes Family History Negative in his father and mother.     Patient  reports that he has been smoking cigarettes. He has been smoking an average of 1 pack per day. He does not have any smokeless tobacco history on file. He reports current alcohol use. He reports that he does not use drugs.       Exam:  Visual acuity ph 20/20 right eye 20/25 left eye. Color vision full each eye. Pupils no rAPD. Anterior segment wnl for age. Posterior segment wnl for age. Strabismus N/A.     Tests ordered and interpreted today:   [facility] VF: 140 degrees OS, 135 degrees OD      Discussion of management / interpretation with another provider: None    Assessment/Plan:   It is my impression that patient has Left MCA stroke in 6/2023 that today patient was evaluated for VF defects in setting of driving requirements. Patient has adequate amount of horizontal visual field vision. Subjective decreased vision is improved with pinhole which suggests patient needs updated glasses. Current glasses are approx 4 years old. Patient has borderline qualification due to vision for legal driving. Discussed with patient that he needs to get updated glasses. Patient voiced  understanding.     Patient has pain in both eyes with looking up.  He is tender over the trochlea both eyes.  We discussed that he could try prednisone, but he is not interested in this.  We also discussed that he could try a steroid injection to the trochlea in each eye and he wanted to pursue this.  After discussing the risks benefits alternatives and complications, he wished to proceed.  After the injection he noted improvement in the pain upon upgaze.      Again, thank you for allowing me to participate in the care of your patient.      Sincerely,    Jackson Moon MD  Professor  Ophthalmology Residency   Director of Neuro-Ophthalmology  [employer] [facility] [employer] Endowed Chair  Departments of Ophthalmology, Neurology, and Neurosurgery  [facility] [address] [place]  [address]  T - [phone]  F - [phone]  ANEL [email or web address]      CC: SARA Barragan CNP  [address] [facility] [address]  Via In Basket    DX = trochleitis

## 2024-01-31 NOTE — NURSING NOTE
Chief Complaints and History of Present Illnesses   Patient presents with    Consult For     Dinesh Farfan is being seen for a consult today by the request of Dr. Coffey for Cerebrovascular accident (CVA)     Chief Complaint(s) and History of Present Illness(es)       Consult For              Associated symptoms: headache and photophobia.  Negative for eye pain    Pain scale: 0/10    Comments: Dinesh MERRILL Michaelky is being seen for a consult today by the request of Dr. Coffey for Cerebrovascular accident (CVA)              Comments    Pt states he had a stroke 7 months ago and 6 months ago.  Pt states he has had blurry vision since, and gets diplopia with an upward gaze.   He tells me his glasses no longer help his vision's clarity.   Headaches left side about twice per week.     Driss Espana 8:36 AM January 31, 2024

## 2024-06-12 PROBLEM — J20.9 ACUTE BRONCHITIS, UNSPECIFIED ORGANISM: Status: RESOLVED | Noted: 2023-11-11 | Resolved: 2024-06-12

## 2024-06-12 PROBLEM — R09.02 HYPOXIA: Status: RESOLVED | Noted: 2023-11-11 | Resolved: 2024-06-12

## 2024-06-12 PROBLEM — I63.9 STROKE (H): Status: RESOLVED | Noted: 2023-06-11 | Resolved: 2024-06-12

## 2024-06-12 PROBLEM — Z86.73 HISTORY OF STROKE: Status: ACTIVE | Noted: 2023-06-11

## 2024-06-12 PROBLEM — C18.0 CECAL CANCER (H): Status: ACTIVE | Noted: 2020-12-18

## 2024-06-12 NOTE — PROGRESS NOTES
NEUROLOGY FOLLOW UP VISIT  NOTE       [facility] NEUROLOGY [place]  [address] [place] [address]  Tel: [phone]  Fax: [phone]  [email or web address]     Dinesh Farfan,  1965, MRN [id number]  PCP: Gian Hoffman  Date: 2024      ASSESSMENT & PLAN     Visit Diagnosis  History of stroke  Occlusion of left carotid artery     H/O left MCA infarction; Lt ICA occlusion  58-year-old male with history of cigarette smoking, HLD, prediabetes, EDUARDO, left carotid occlusion who was admitted to [facility] on 2023 for left MCA infarction and was noted to have left ICA occlusion.  He has made quite remarkable improvement but continues to have some speech difficulty.  He continues to smoke but workup so far includes a normal echocardiogram 30-day event monitor.  He was initially on DAPT and then switched to full-strength aspirin.  I have recommended:    1.  Continue enteric-coated aspirin 325 mg daily  2.  Continue Lipitor 80 mg daily.  His most recent LDL last year was 92.  Repeat lipid panel  3.  He was noted to be prediabetic during his last visit and I have recommended rechecking hemoglobin A1c  4.  He was strongly encouraged to quit smoking  5.  His wife reports that he tends to snore and at times stop breathing and in the past was given a referral to sleep but as they kept on changing his appointment he got upset and did not make that appointment.  I have encouraged him to get evaluated for obstructive sleep apnea  6.  Repeat carotid ultrasound  7.  Continue physical and speech therapy  8.  Mediterranean diet  9.  Follow-up in 6 months    Thank you again for this referral, please feel free to contact me if you have any questions.    Neal Costello MD  [facility]     HISTORY OF PRESENT ILLNESS     Patient is a 58-year-old male with history of cigarette smoking, EDUARDO, left carotid occlusion who was admitted to [facility] on 2023 for  a left MCA stroke and was found to have left ICA occlusion.  He was initially admitted on 6/11/2023 and return to the ER on 6/13/2023 for worsening facial weakness, aphasia, right upper weakness he had a MRI of the brain that showed left middle cerebral artery infarction.  MRA showed decreased filling of left M2 with occlusion of the left ICA.  LDL was 92 and hemoglobin A1c 6.0.  He was started on DAPT, statin.  Eventually was switched to enteric-coated aspirin.  After discharge he was seen by the nurse practitioner and went through physical therapy and had a 30-day event monitor on 12/15/2023 that showed sinus rhythm & no atrial fibrillation.    Patient is somewhat frustrated as he feels nobody knows what is going on with him.  He does have a history of cigarette smoking and in spite of stroke continues to smoke.  During his last visit he was referred to sleep for evaluation of EDUARDO but has not kept on changing the appointment he got upset and did not go for the evaluation.  He feels his speech is improving and denies any weakness     PROBLEM LIST   Patient Active Problem List   Diagnosis    Carotid artery occlusion with cerebral infarction (H)    Occlusion of left carotid artery    H/O Lt MCA infarction    History of tobacco abuse    Obstructive sleep apnea    Cecal cancer (H)    Prediabetes         PAST MEDICAL & SURGICAL HISTORY     Past Medical History:   Patient  has a past medical history of Herniated lumbar disc without myelopathy.    Surgical History:  He  has no past surgical history on file.     SOCIAL HISTORY     Reviewed, and he  reports that he has been smoking cigarettes. He does not have any smokeless tobacco history on file. He reports current alcohol use. He reports that he does not use drugs.     FAMILY HISTORY     Reviewed, and family history includes Family History Negative in his father and mother.     ALLERGIES     Allergies   Allergen Reactions    Morphine Unknown     Takes hydrocodone without  problem.         REVIEW OF SYSTEMS     A 12 point review of system was performed and was negative except as outlined in the history of present illness.     HOME MEDICATIONS     Current Outpatient Rx   Medication Sig Dispense Refill    albuterol (PROAIR HFA/PROVENTIL HFA/VENTOLIN HFA) 108 (90 Base) MCG/ACT inhaler Inhale 2 puffs into the lungs every 6 hours as needed for shortness of breath, wheezing or cough      aspirin (ASA) 325 MG EC tablet Take 1 tablet (325 mg) by mouth daily 90 tablet 3    atorvastatin (LIPITOR) 80 MG tablet Take 1 tablet (80 mg) by mouth every evening 90 tablet 3    chlorhexidine (PERIDEX) 0.12 % solution       DULoxetine (CYMBALTA) 60 MG capsule Take 60 mg by mouth daily      HYDROcodone-acetaminophen (NORCO)  MG per tablet       hydrOXYzine (ATARAX) 25 MG tablet Take 25 mg by mouth 3 times daily as needed for anxiety      hydrOXYzine alyssa (VISTARIL) 25 MG capsule       ibuprofen (ADVIL/MOTRIN) 800 MG tablet Take 800 mg by mouth every 8 hours as needed for moderate pain      NARCAN 4 MG/0.1ML nasal spray USE AS NEEDED FOR OPIOID OVERDOSE PER INSTRUCTION      nicotine (COMMIT) 2 MG lozenge Place 1 lozenge (2 mg) inside cheek every hour as needed for other (nicotine withdrawal symptoms) 72 lozenge 0    nicotine (NICODERM CQ) 21 MG/24HR 24 hr patch Place 1 patch onto the skin every 24 hours 28 patch 0    QUEtiapine (SEROQUEL) 50 MG tablet Take  mg by mouth At Bedtime      tadalafil (CIALIS) 10 MG tablet Take 10 mg by mouth      terazosin (HYTRIN) 10 MG capsule Take 10 mg by mouth daily      ipratropium-albuterol (COMBIVENT RESPIMAT)  MCG/ACT inhaler Inhale 1 puff into the lungs 4 times daily for 30 days 1 each 0         PHYSICAL EXAM     Vital signs  /83   Pulse 89   Wt 91.4 kg (201 lb 8 oz)   BMI 30.64 kg/m      Weight:   201 lbs 8 oz    Middle-age obese gentleman who is alert and oriented vital signs are reviewed and documented in electronic medical record.  Neck  supple.  Neurologically he has difficulty with naming parts of objects and expressing himself at time comprehension is intact he can read and repeat.  He has normal mass and tone 5/5 strength reflexes 1+ toes downgoing.  No dysmetria noted on finger-nose testing gait normal Romberg negative.     PERTINENT DIAGNOSTIC STUDIES     Following studies were reviewed:     CTA HEAD AND NECK 6/13/2023  HEAD CTA:  1.  Decreased filling of anterior division M2 middle cerebral artery  branches on the left versus 6/11/2023 consistent with vessel  occlusion. This generally corresponds to the area of infarct  identified on CT and MRI.  2.  The more proximal M1 segment of the left middle cerebral artery  and posterior division M2 branches appear patent.  3.  The proximal intracranial segment of the left internal carotid  artery remains occluded until the level of the clinoid process,  possibly slightly progressed.     NECK CTA:  1.  Unchanged occlusion of the left common and internal carotid  arteries. Filling of the external branches is presumably via  collaterals.  2.  Mild right carotid artery atherosclerosis without significant  narrowing.  3.  Moderate to severe narrowing at the origin of the right vertebral  artery unchanged. No dissection.    MRI BRAIN 6/13/2023  1.  Evolving areas of recent infarct in the anterior left middle  cerebral artery territory affecting the frontal lobe and insula. The  areas of ischemic tissue are slightly more confluent but overall  unchanged in size.  2.  No area of new infarct.  3.  No hemorrhage.    ECHOCARDIOGRAM 6/11/2023  No cardiac source of emboli noted    CARDIAC EVENT MONITOR 12/15/2023  Normal sinus rhythm.  No cardiac arrhythmias     PERTINENT LABS  Following labs were reviewed:  No visits with results within 3 Month(s) from this visit.   Latest known visit with results is:   Admission on 11/11/2023, Discharged on 11/13/2023   Component Date Value Ref Range Status    Sodium 11/11/2023  139  135 - 145 mmol/L Final    Potassium 11/11/2023 4.2  3.4 - 5.3 mmol/L Final    Chloride 11/11/2023 102  98 - 107 mmol/L Final    Carbon Dioxide (CO2) 11/11/2023 28  22 - 29 mmol/L Final    Anion Gap 11/11/2023 9  7 - 15 mmol/L Final    Urea Nitrogen 11/11/2023 20.4 (H)  6.0 - 20.0 mg/dL Final    Creatinine 11/11/2023 0.87  0.67 - 1.17 mg/dL Final    GFR Estimate 11/11/2023 >90  >60 mL/min/1.73m2 Final    Calcium 11/11/2023 9.4  8.6 - 10.0 mg/dL Final    Glucose 11/11/2023 103 (H)  70 - 99 mg/dL Final    Troponin T, High Sensitivity 11/11/2023 7  <=22 ng/L Final    Ventricular Rate 11/11/2023 93  BPM Final    Atrial Rate 11/11/2023 93  BPM Final    MA Interval 11/11/2023 154  ms Final    QRS Duration 11/11/2023 102  ms Final    QT 11/11/2023 370  ms Final    QTc 11/11/2023 460  ms Final    P Axis 11/11/2023 74  degrees Final    R AXIS 11/11/2023 62  degrees Final    T Axis 11/11/2023 58  degrees Final    Interpretation ECG 11/11/2023    Final                    Value:Sinus rhythm  Normal ECG  When compared with ECG of 13-JUN-2023 10:49,  No significant change was found  Confirmed by - EMERGENCY ROOM, PHYSICIAN ([id number]),  COURT LOZOYA (Samantha) on 11/13/2023 9:15:10 AM      pH Venous 11/11/2023 7.39  7.32 - 7.43 Final    pCO2 Venous 11/11/2023 49  40 - 50 mm Hg Final    pO2 Venous 11/11/2023 40  25 - 47 mm Hg Final    Bicarbonate Venous 11/11/2023 30 (H)  21 - 28 mmol/L Final    Base Excess/Deficit Venous 11/11/2023 3.6 (H)  -7.7 - 1.9 mmol/L Final    FIO2 11/11/2023 0   Final    Influenza A PCR 11/11/2023 Negative  Negative Final    Influenza B PCR 11/11/2023 Negative  Negative Final    RSV PCR 11/11/2023 Negative  Negative Final    SARS CoV2 PCR 11/11/2023 Negative  Negative Final    WBC Count 11/11/2023 5.5  4.0 - 11.0 10e3/uL Final    RBC Count 11/11/2023 4.61  4.40 - 5.90 10e6/uL Final    Hemoglobin 11/11/2023 14.3  13.3 - 17.7 g/dL Final    Hematocrit 11/11/2023 42.4  40.0 - 53.0 % Final    MCV  11/11/2023 92  78 - 100 fL Final    MCH 11/11/2023 31.0  26.5 - 33.0 pg Final    MCHC 11/11/2023 33.7  31.5 - 36.5 g/dL Final    RDW 11/11/2023 13.3  10.0 - 15.0 % Final    Platelet Count 11/11/2023 194  150 - 450 10e3/uL Final    % Neutrophils 11/11/2023 50  % Final    % Lymphocytes 11/11/2023 34  % Final    % Monocytes 11/11/2023 14  % Final    % Eosinophils 11/11/2023 1  % Final    % Basophils 11/11/2023 1  % Final    % Immature Granulocytes 11/11/2023 0  % Final    NRBCs per 100 WBC 11/11/2023 0  <1 /100 Final    Absolute Neutrophils 11/11/2023 2.7  1.6 - 8.3 10e3/uL Final    Absolute Lymphocytes 11/11/2023 1.9  0.8 - 5.3 10e3/uL Final    Absolute Monocytes 11/11/2023 0.8  0.0 - 1.3 10e3/uL Final    Absolute Eosinophils 11/11/2023 0.1  0.0 - 0.7 10e3/uL Final    Absolute Basophils 11/11/2023 0.0  0.0 - 0.2 10e3/uL Final    Absolute Immature Granulocytes 11/11/2023 0.0  <=0.4 10e3/uL Final    Absolute NRBCs 11/11/2023 0.0  10e3/uL Final    Sodium 11/12/2023 138  135 - 145 mmol/L Final    Potassium 11/12/2023 3.9  3.4 - 5.3 mmol/L Final    Chloride 11/12/2023 103  98 - 107 mmol/L Final    Carbon Dioxide (CO2) 11/12/2023 27  22 - 29 mmol/L Final    Anion Gap 11/12/2023 8  7 - 15 mmol/L Final    Urea Nitrogen 11/12/2023 17.5  6.0 - 20.0 mg/dL Final    Creatinine 11/12/2023 0.69  0.67 - 1.17 mg/dL Final    GFR Estimate 11/12/2023 >90  >60 mL/min/1.73m2 Final    Calcium 11/12/2023 8.8  8.6 - 10.0 mg/dL Final    Glucose 11/12/2023 129 (H)  70 - 99 mg/dL Final    WBC Count 11/12/2023 14.4 (H)  4.0 - 11.0 10e3/uL Final    RBC Count 11/12/2023 4.28 (L)  4.40 - 5.90 10e6/uL Final    Hemoglobin 11/12/2023 13.4  13.3 - 17.7 g/dL Final    Hematocrit 11/12/2023 39.1 (L)  40.0 - 53.0 % Final    MCV 11/12/2023 91  78 - 100 fL Final    MCH 11/12/2023 31.3  26.5 - 33.0 pg Final    MCHC 11/12/2023 34.3  31.5 - 36.5 g/dL Final    RDW 11/12/2023 13.4  10.0 - 15.0 % Final    Platelet Count 11/12/2023 192  150 - 450 10e3/uL Final          Total time spent for face to face visit, reviewing labs/imaging studies, counseling and coordination of care was: 45 Minutes spent on the date of the encounter doing chart review, review of outside records, review of test results, interpretation of tests, patient visit, documentation, and discussion with family     The longitudinal plan of care for the diagnosis(es)/condition(s) as documented were addressed during this visit. Due to the added complexity in care, I will continue to support Dinesh in the subsequent management and with ongoing continuity of care.    This note was dictated using voice recognition software.  Any grammatical or context distortions are unintentional and inherent to the software.    Orders Placed This Encounter   Procedures    US Carotid Bilateral    Lipid Profile    Hemoglobin A1c    Adult Sleep Eval & Management  Referral      New Prescriptions    No medications on file     Modified Medications    Modified Medication Previous Medication    ASPIRIN (ASA) 325 MG EC TABLET aspirin (ASA) 325 MG EC tablet       Take 1 tablet (325 mg) by mouth daily    Take 1 tablet (325 mg) by mouth daily    ATORVASTATIN (LIPITOR) 80 MG TABLET atorvastatin (LIPITOR) 80 MG tablet       Take 1 tablet (80 mg) by mouth every evening    Take 1 tablet (80 mg) by mouth every evening

## 2024-06-13 ENCOUNTER — OFFICE VISIT (OUTPATIENT)
Dept: NEUROLOGY | Facility: CLINIC | Age: 59
End: 2024-06-13
Payer: MEDICARE

## 2024-06-13 VITALS
SYSTOLIC BLOOD PRESSURE: 134 MMHG | WEIGHT: 201.5 LBS | DIASTOLIC BLOOD PRESSURE: 83 MMHG | HEART RATE: 89 BPM | BODY MASS INDEX: 30.64 KG/M2

## 2024-06-13 DIAGNOSIS — I65.22 OCCLUSION OF LEFT CAROTID ARTERY: ICD-10-CM

## 2024-06-13 DIAGNOSIS — R73.03 PREDIABETES: ICD-10-CM

## 2024-06-13 DIAGNOSIS — Z86.73 HISTORY OF STROKE: Primary | ICD-10-CM

## 2024-06-13 DIAGNOSIS — G47.33 OBSTRUCTIVE SLEEP APNEA: ICD-10-CM

## 2024-06-13 PROCEDURE — G2211 COMPLEX E/M VISIT ADD ON: HCPCS | Performed by: PSYCHIATRY & NEUROLOGY

## 2024-06-13 PROCEDURE — 99215 OFFICE O/P EST HI 40 MIN: CPT | Performed by: PSYCHIATRY & NEUROLOGY

## 2024-06-13 RX ORDER — ASPIRIN 325 MG
325 TABLET, DELAYED RELEASE (ENTERIC COATED) ORAL DAILY
Qty: 90 TABLET | Refills: 3 | Status: SHIPPED | OUTPATIENT
Start: 2024-06-13

## 2024-06-13 RX ORDER — ATORVASTATIN CALCIUM 80 MG/1
80 TABLET, FILM COATED ORAL EVERY EVENING
Qty: 90 TABLET | Refills: 3 | Status: SHIPPED | OUTPATIENT
Start: 2024-06-13

## 2024-06-13 NOTE — LETTER
2024      Dinesh Farfan  [address] [place] [address]      Dear Colleague,    Thank you for referring your patient, Dinesh Farfan, to the [facility] [place]. Please see a copy of my visit note below.    NEUROLOGY FOLLOW UP VISIT  NOTE       [facility] [place]  [address] [place] [address]  Tel: [phone]  Fax: [phone]  [email or web address]     Dinesh Farfan,  1965, MRN [id number]  PCP: Gian Hoffman  Date: 2024      ASSESSMENT & PLAN     Visit Diagnosis  History of stroke  Occlusion of left carotid artery     H/O left MCA infarction; Lt ICA occlusion  58-year-old male with history of cigarette smoking, HLD, prediabetes, EDUARDO, left carotid occlusion who was admitted to [facility] on 2023 for left MCA infarction and was noted to have left ICA occlusion.  He has made quite remarkable improvement but continues to have some speech difficulty.  He continues to smoke but workup so far includes a normal echocardiogram 30-day event monitor.  He was initially on DAPT and then switched to full-strength aspirin.  I have recommended:    1.  Continue enteric-coated aspirin 325 mg daily  2.  Continue Lipitor 80 mg daily.  His most recent LDL last year was 92.  Repeat lipid panel  3.  He was noted to be prediabetic during his last visit and I have recommended rechecking hemoglobin A1c  4.  He was strongly encouraged to quit smoking  5.  His wife reports that he tends to snore and at times stop breathing and in the past was given a referral to sleep but as they kept on changing his appointment he got upset and did not make that appointment.  I have encouraged him to get evaluated for obstructive sleep apnea  6.  Repeat carotid ultrasound  7.  Continue physical and speech therapy  8.  Mediterranean diet  9.  Follow-up in 6 months    Thank you again for this referral, please feel free to contact me if you  have any questions.    Neal Costello MD  [facility]     HISTORY OF PRESENT ILLNESS     Patient is a 58-year-old male with history of cigarette smoking, EDUARDO, left carotid occlusion who was admitted to [facility] on 6/11/2023 for a left MCA stroke and was found to have left ICA occlusion.  He was initially admitted on 6/11/2023 and return to the ER on 6/13/2023 for worsening facial weakness, aphasia, right upper weakness he had a MRI of the brain that showed left middle cerebral artery infarction.  MRA showed decreased filling of left M2 with occlusion of the left ICA.  LDL was 92 and hemoglobin A1c 6.0.  He was started on DAPT, statin.  Eventually was switched to enteric-coated aspirin.  After discharge he was seen by the nurse practitioner and went through physical therapy and had a 30-day event monitor on 12/15/2023 that showed sinus rhythm & no atrial fibrillation.    Patient is somewhat frustrated as he feels nobody knows what is going on with him.  He does have a history of cigarette smoking and in spite of stroke continues to smoke.  During his last visit he was referred to sleep for evaluation of EDUARDO but has not kept on changing the appointment he got upset and did not go for the evaluation.  He feels his speech is improving and denies any weakness     PROBLEM LIST   Patient Active Problem List   Diagnosis     Carotid artery occlusion with cerebral infarction (H)     Occlusion of left carotid artery     H/O Lt MCA infarction     History of tobacco abuse     Obstructive sleep apnea     Cecal cancer (H)     Prediabetes         PAST MEDICAL & SURGICAL HISTORY     Past Medical History:   Patient  has a past medical history of Herniated lumbar disc without myelopathy.    Surgical History:  He  has no past surgical history on file.     SOCIAL HISTORY     Reviewed, and he  reports that he has been smoking cigarettes. He does not have any smokeless tobacco history on file. He reports  current alcohol use. He reports that he does not use drugs.     FAMILY HISTORY     Reviewed, and family history includes Family History Negative in his father and mother.     ALLERGIES     Allergies   Allergen Reactions     Morphine Unknown     Takes hydrocodone without problem.         REVIEW OF SYSTEMS     A 12 point review of system was performed and was negative except as outlined in the history of present illness.     HOME MEDICATIONS     Current Outpatient Rx   Medication Sig Dispense Refill     albuterol (PROAIR HFA/PROVENTIL HFA/VENTOLIN HFA) 108 (90 Base) MCG/ACT inhaler Inhale 2 puffs into the lungs every 6 hours as needed for shortness of breath, wheezing or cough       aspirin (ASA) 325 MG EC tablet Take 1 tablet (325 mg) by mouth daily 90 tablet 3     atorvastatin (LIPITOR) 80 MG tablet Take 1 tablet (80 mg) by mouth every evening 90 tablet 3     chlorhexidine (PERIDEX) 0.12 % solution        DULoxetine (CYMBALTA) 60 MG capsule Take 60 mg by mouth daily       HYDROcodone-acetaminophen (NORCO)  MG per tablet        hydrOXYzine (ATARAX) 25 MG tablet Take 25 mg by mouth 3 times daily as needed for anxiety       hydrOXYzine alyssa (VISTARIL) 25 MG capsule        ibuprofen (ADVIL/MOTRIN) 800 MG tablet Take 800 mg by mouth every 8 hours as needed for moderate pain       NARCAN 4 MG/0.1ML nasal spray USE AS NEEDED FOR OPIOID OVERDOSE PER INSTRUCTION       nicotine (COMMIT) 2 MG lozenge Place 1 lozenge (2 mg) inside cheek every hour as needed for other (nicotine withdrawal symptoms) 72 lozenge 0     nicotine (NICODERM CQ) 21 MG/24HR 24 hr patch Place 1 patch onto the skin every 24 hours 28 patch 0     QUEtiapine (SEROQUEL) 50 MG tablet Take  mg by mouth At Bedtime       tadalafil (CIALIS) 10 MG tablet Take 10 mg by mouth       terazosin (HYTRIN) 10 MG capsule Take 10 mg by mouth daily       ipratropium-albuterol (COMBIVENT RESPIMAT)  MCG/ACT inhaler Inhale 1 puff into the lungs 4 times daily for  30 days 1 each 0         PHYSICAL EXAM     Vital signs  /83   Pulse 89   Wt 91.4 kg (201 lb 8 oz)   BMI 30.64 kg/m      Weight:   201 lbs 8 oz    Middle-age obese gentleman who is alert and oriented vital signs are reviewed and documented in electronic medical record.  Neck supple.  Neurologically he has difficulty with naming parts of objects and expressing himself at time comprehension is intact he can read and repeat.  He has normal mass and tone 5/5 strength reflexes 1+ toes downgoing.  No dysmetria noted on finger-nose testing gait normal Romberg negative.     PERTINENT DIAGNOSTIC STUDIES     Following studies were reviewed:     CTA HEAD AND NECK 6/13/2023  HEAD CTA:  1.  Decreased filling of anterior division M2 middle cerebral artery  branches on the left versus 6/11/2023 consistent with vessel  occlusion. This generally corresponds to the area of infarct  identified on CT and MRI.  2.  The more proximal M1 segment of the left middle cerebral artery  and posterior division M2 branches appear patent.  3.  The proximal intracranial segment of the left internal carotid  artery remains occluded until the level of the clinoid process,  possibly slightly progressed.     NECK CTA:  1.  Unchanged occlusion of the left common and internal carotid  arteries. Filling of the external branches is presumably via  collaterals.  2.  Mild right carotid artery atherosclerosis without significant  narrowing.  3.  Moderate to severe narrowing at the origin of the right vertebral  artery unchanged. No dissection.    MRI BRAIN 6/13/2023  1.  Evolving areas of recent infarct in the anterior left middle  cerebral artery territory affecting the frontal lobe and insula. The  areas of ischemic tissue are slightly more confluent but overall  unchanged in size.  2.  No area of new infarct.  3.  No hemorrhage.    ECHOCARDIOGRAM 6/11/2023  No cardiac source of emboli noted    CARDIAC EVENT MONITOR 12/15/2023  Normal sinus rhythm.   No cardiac arrhythmias     PERTINENT LABS  Following labs were reviewed:  No visits with results within 3 Month(s) from this visit.   Latest known visit with results is:   Admission on 11/11/2023, Discharged on 11/13/2023   Component Date Value Ref Range Status     Sodium 11/11/2023 139  135 - 145 mmol/L Final     Potassium 11/11/2023 4.2  3.4 - 5.3 mmol/L Final     Chloride 11/11/2023 102  98 - 107 mmol/L Final     Carbon Dioxide (CO2) 11/11/2023 28  22 - 29 mmol/L Final     Anion Gap 11/11/2023 9  7 - 15 mmol/L Final     Urea Nitrogen 11/11/2023 20.4 (H)  6.0 - 20.0 mg/dL Final     Creatinine 11/11/2023 0.87  0.67 - 1.17 mg/dL Final     GFR Estimate 11/11/2023 >90  >60 mL/min/1.73m2 Final     Calcium 11/11/2023 9.4  8.6 - 10.0 mg/dL Final     Glucose 11/11/2023 103 (H)  70 - 99 mg/dL Final     Troponin T, High Sensitivity 11/11/2023 7  <=22 ng/L Final     Ventricular Rate 11/11/2023 93  BPM Final     Atrial Rate 11/11/2023 93  BPM Final     OK Interval 11/11/2023 154  ms Final     QRS Duration 11/11/2023 102  ms Final     QT 11/11/2023 370  ms Final     QTc 11/11/2023 460  ms Final     P Axis 11/11/2023 74  degrees Final     R AXIS 11/11/2023 62  degrees Final     T Axis 11/11/2023 58  degrees Final     Interpretation ECG 11/11/2023    Final                    Value:Sinus rhythm  Normal ECG  When compared with ECG of 13-JUN-2023 10:49,  No significant change was found  Confirmed by - EMERGENCY ROOM, PHYSICIAN ([id number]),  COURT LOZOYA (Samantha) on 11/13/2023 9:15:10 AM       pH Venous 11/11/2023 7.39  7.32 - 7.43 Final     pCO2 Venous 11/11/2023 49  40 - 50 mm Hg Final     pO2 Venous 11/11/2023 40  25 - 47 mm Hg Final     Bicarbonate Venous 11/11/2023 30 (H)  21 - 28 mmol/L Final     Base Excess/Deficit Venous 11/11/2023 3.6 (H)  -7.7 - 1.9 mmol/L Final     FIO2 11/11/2023 0   Final     Influenza A PCR 11/11/2023 Negative  Negative Final     Influenza B PCR 11/11/2023 Negative  Negative Final     RSV PCR  11/11/2023 Negative  Negative Final     SARS CoV2 PCR 11/11/2023 Negative  Negative Final     WBC Count 11/11/2023 5.5  4.0 - 11.0 10e3/uL Final     RBC Count 11/11/2023 4.61  4.40 - 5.90 10e6/uL Final     Hemoglobin 11/11/2023 14.3  13.3 - 17.7 g/dL Final     Hematocrit 11/11/2023 42.4  40.0 - 53.0 % Final     MCV 11/11/2023 92  78 - 100 fL Final     MCH 11/11/2023 31.0  26.5 - 33.0 pg Final     MCHC 11/11/2023 33.7  31.5 - 36.5 g/dL Final     RDW 11/11/2023 13.3  10.0 - 15.0 % Final     Platelet Count 11/11/2023 194  150 - 450 10e3/uL Final     % Neutrophils 11/11/2023 50  % Final     % Lymphocytes 11/11/2023 34  % Final     % Monocytes 11/11/2023 14  % Final     % Eosinophils 11/11/2023 1  % Final     % Basophils 11/11/2023 1  % Final     % Immature Granulocytes 11/11/2023 0  % Final     NRBCs per 100 WBC 11/11/2023 0  <1 /100 Final     Absolute Neutrophils 11/11/2023 2.7  1.6 - 8.3 10e3/uL Final     Absolute Lymphocytes 11/11/2023 1.9  0.8 - 5.3 10e3/uL Final     Absolute Monocytes 11/11/2023 0.8  0.0 - 1.3 10e3/uL Final     Absolute Eosinophils 11/11/2023 0.1  0.0 - 0.7 10e3/uL Final     Absolute Basophils 11/11/2023 0.0  0.0 - 0.2 10e3/uL Final     Absolute Immature Granulocytes 11/11/2023 0.0  <=0.4 10e3/uL Final     Absolute NRBCs 11/11/2023 0.0  10e3/uL Final     Sodium 11/12/2023 138  135 - 145 mmol/L Final     Potassium 11/12/2023 3.9  3.4 - 5.3 mmol/L Final     Chloride 11/12/2023 103  98 - 107 mmol/L Final     Carbon Dioxide (CO2) 11/12/2023 27  22 - 29 mmol/L Final     Anion Gap 11/12/2023 8  7 - 15 mmol/L Final     Urea Nitrogen 11/12/2023 17.5  6.0 - 20.0 mg/dL Final     Creatinine 11/12/2023 0.69  0.67 - 1.17 mg/dL Final     GFR Estimate 11/12/2023 >90  >60 mL/min/1.73m2 Final     Calcium 11/12/2023 8.8  8.6 - 10.0 mg/dL Final     Glucose 11/12/2023 129 (H)  70 - 99 mg/dL Final     WBC Count 11/12/2023 14.4 (H)  4.0 - 11.0 10e3/uL Final     RBC Count 11/12/2023 4.28 (L)  4.40 - 5.90 10e6/uL Final      Hemoglobin 11/12/2023 13.4  13.3 - 17.7 g/dL Final     Hematocrit 11/12/2023 39.1 (L)  40.0 - 53.0 % Final     MCV 11/12/2023 91  78 - 100 fL Final     MCH 11/12/2023 31.3  26.5 - 33.0 pg Final     MCHC 11/12/2023 34.3  31.5 - 36.5 g/dL Final     RDW 11/12/2023 13.4  10.0 - 15.0 % Final     Platelet Count 11/12/2023 192  150 - 450 10e3/uL Final         Total time spent for face to face visit, reviewing labs/imaging studies, counseling and coordination of care was: 45 Minutes spent on the date of the encounter doing chart review, review of outside records, review of test results, interpretation of tests, patient visit, documentation, and discussion with family     The longitudinal plan of care for the diagnosis(es)/condition(s) as documented were addressed during this visit. Due to the added complexity in care, I will continue to support Dinesh in the subsequent management and with ongoing continuity of care.    This note was dictated using voice recognition software.  Any grammatical or context distortions are unintentional and inherent to the software.    Orders Placed This Encounter   Procedures     US Carotid Bilateral     Lipid Profile     Hemoglobin A1c     Adult Sleep Eval & Management  Referral      New Prescriptions    No medications on file     Modified Medications    Modified Medication Previous Medication    ASPIRIN (ASA) 325 MG EC TABLET aspirin (ASA) 325 MG EC tablet       Take 1 tablet (325 mg) by mouth daily    Take 1 tablet (325 mg) by mouth daily    ATORVASTATIN (LIPITOR) 80 MG TABLET atorvastatin (LIPITOR) 80 MG tablet       Take 1 tablet (80 mg) by mouth every evening    Take 1 tablet (80 mg) by mouth every evening                 Again, thank you for allowing me to participate in the care of your patient.        Sincerely,        Neal Costello MD

## 2024-06-13 NOTE — NURSING NOTE
"Dinesh Farfan is a 58 year old male who presents for:  Chief Complaint   Patient presents with    Stroke     Pt last saw Neeru in December of 2023; advised to Neurosurgery but then was told to see Vascular. Don't see office visit with Vascular in chart.   Unable to complete sleep study; patient stated they had to r/s him a couple of time and patient didn't want to r/s the third time.   Pt has been experiencing severe headaches, but none lately.   Patient is still somewhat dealing vision problem. Dizziness and cloudy when looked up.         Initial Vitals:  /83   Pulse 89   Wt 91.4 kg (201 lb 8 oz)   BMI 30.64 kg/m   Estimated body mass index is 30.64 kg/m  as calculated from the following:    Height as of 12/18/23: 1.727 m (5' 8\").    Weight as of this encounter: 91.4 kg (201 lb 8 oz).. Body surface area is 2.09 meters squared. BP completed using cuff size: wrist cuff    Rickey Holcomb  "

## 2024-06-18 ENCOUNTER — HOSPITAL ENCOUNTER (OUTPATIENT)
Dept: ULTRASOUND IMAGING | Facility: CLINIC | Age: 59
Discharge: HOME | End: 2024-06-18
Attending: PSYCHIATRY & NEUROLOGY | Admitting: PSYCHIATRY & NEUROLOGY
Payer: MEDICARE

## 2024-06-18 DIAGNOSIS — I65.22 OCCLUSION OF LEFT CAROTID ARTERY: ICD-10-CM

## 2024-06-18 DIAGNOSIS — Z86.73 HISTORY OF STROKE: ICD-10-CM

## 2024-06-18 PROCEDURE — 93880 EXTRACRANIAL BILAT STUDY: CPT

## 2024-06-19 ENCOUNTER — LAB (OUTPATIENT)
Dept: LAB | Facility: CLINIC | Age: 59
End: 2024-06-19
Payer: MEDICARE

## 2024-06-19 ENCOUNTER — TELEPHONE (OUTPATIENT)
Dept: NEUROLOGY | Facility: CLINIC | Age: 59
End: 2024-06-19

## 2024-06-19 DIAGNOSIS — Z86.73 HISTORY OF STROKE: ICD-10-CM

## 2024-06-19 DIAGNOSIS — R73.03 PREDIABETES: ICD-10-CM

## 2024-06-19 DIAGNOSIS — I65.22 OCCLUSION OF LEFT CAROTID ARTERY: Primary | ICD-10-CM

## 2024-06-19 LAB
CHOLEST SERPL-MCNC: 139 MG/DL
FASTING STATUS PATIENT QL REPORTED: YES
HBA1C MFR BLD: 6.2 % (ref 0–5.6)
HDLC SERPL-MCNC: 48 MG/DL
LDLC SERPL CALC-MCNC: 73 MG/DL
NONHDLC SERPL-MCNC: 91 MG/DL
TRIGL SERPL-MCNC: 91 MG/DL

## 2024-06-19 PROCEDURE — 80061 LIPID PANEL: CPT | Performed by: PEDIATRICS

## 2024-06-19 PROCEDURE — 83036 HEMOGLOBIN GLYCOSYLATED A1C: CPT | Performed by: PEDIATRICS

## 2024-06-19 PROCEDURE — 36415 COLL VENOUS BLD VENIPUNCTURE: CPT | Performed by: PEDIATRICS

## 2024-06-19 NOTE — RESULT ENCOUNTER NOTE
Carotid ultrasound shows occlusion of the left common carotid and left external carotid artery but there appears to be some flow in the left internal carotid artery that appears improved compared to previous CTA done on 6/13/2023.  Repeat CTA head and neck.  See orders.  No significant stenosis noted in the right internal carotid artery.

## 2024-06-19 NOTE — TELEPHONE ENCOUNTER
----- Message from Neal Costello sent at 6/19/2024  5:19 AM CDT -----  Carotid ultrasound shows occlusion of the left common carotid and left external carotid artery but there appears to be some flow in the left internal carotid artery that appears improved compared to previous CTA done on 6/13/2023.  Repeat CTA head and neck.  See orders.  No significant stenosis noted in the right internal carotid artery.

## 2024-06-19 NOTE — TELEPHONE ENCOUNTER
Called and left message for patient, requested a call back to clinic to discuss US result notation and follow-up plan of care per Dr. Costello.    Kevin Luz, RN, BSN  [facility]     How Severe Is Your Rash?: mild Is This A New Presentation, Or A Follow-Up?: Rash

## 2024-06-20 NOTE — TELEPHONE ENCOUNTER
Called and spoke with patient, conveying provider impressions of US, and recommendation for repeat CTA (imaging phone number given to patient).    They are amenable with plan and will follow-up on the CTA.    Kevin Luz RN, BSN  [facility]

## 2024-07-02 ENCOUNTER — HOSPITAL ENCOUNTER (OUTPATIENT)
Dept: CT IMAGING | Facility: CLINIC | Age: 59
Discharge: HOME | End: 2024-07-02
Attending: PSYCHIATRY & NEUROLOGY | Admitting: PSYCHIATRY & NEUROLOGY
Payer: MEDICARE

## 2024-07-02 DIAGNOSIS — I65.22 OCCLUSION OF LEFT CAROTID ARTERY: ICD-10-CM

## 2024-07-02 PROCEDURE — 250N000011 HC RX IP 250 OP 636: Performed by: PSYCHIATRY & NEUROLOGY

## 2024-07-02 PROCEDURE — 70496 CT ANGIOGRAPHY HEAD: CPT | Mod: MG

## 2024-07-02 PROCEDURE — 250N000009 HC RX 250: Performed by: PSYCHIATRY & NEUROLOGY

## 2024-07-02 RX ORDER — IOPAMIDOL 755 MG/ML
500 INJECTION, SOLUTION INTRAVASCULAR ONCE
Status: COMPLETED | OUTPATIENT
Start: 2024-07-02 | End: 2024-07-02

## 2024-07-02 RX ADMIN — SODIUM CHLORIDE 80 ML: 9 INJECTION, SOLUTION INTRAVENOUS at 14:09

## 2024-07-02 RX ADMIN — IOPAMIDOL 67 ML: 755 INJECTION, SOLUTION INTRAVENOUS at 14:09

## 2024-07-19 ENCOUNTER — TRANSFERRED RECORDS (OUTPATIENT)
Dept: HEALTH INFORMATION MANAGEMENT | Facility: CLINIC | Age: 59
End: 2024-07-19
Payer: MEDICARE

## 2024-07-19 ENCOUNTER — MEDICAL CORRESPONDENCE (OUTPATIENT)
Dept: HEALTH INFORMATION MANAGEMENT | Facility: CLINIC | Age: 59
End: 2024-07-19
Payer: MEDICARE

## 2024-07-26 ENCOUNTER — TRANSCRIBE ORDERS (OUTPATIENT)
Dept: OTHER | Age: 59
End: 2024-07-26

## 2024-07-26 DIAGNOSIS — C18.0 CECAL CANCER (H): Primary | ICD-10-CM

## 2024-08-11 ENCOUNTER — HEALTH MAINTENANCE LETTER (OUTPATIENT)
Age: 59
End: 2024-08-11

## 2024-12-13 PROBLEM — I67.2 INTRACRANIAL ATHEROSCLEROSIS: Status: ACTIVE | Noted: 2024-12-13

## 2025-03-23 NOTE — PROGRESS NOTES
NEUROLOGY FOLLOW UP VISIT  NOTE       [facility] NEUROLOGY [place]  [address] [place] [address]  Tel: [phone]  Fax: [phone]  [email or web address]     Dinesh Farfan,  1965, MRN [id number]  PCP: Gian Hoffman  Date: 2025      ASSESSMENT & PLAN     Visit Diagnosis  H/O Lt MCA infarction  Intracranial atherosclerosis  Occlusion of left carotid artery     H/O left MCA infarction; Lt ICA occlusion  59-year-old male with history of cigarette smoking, HLD, prediabetes, EDUARDO, left carotid occlusion who was admitted to [facility] on 2023 for left MCA infarction and was noted to have left ICA occlusion.  He has made quite a remarkable improvement.  Since his last visit he had a repeat carotid ultrasound and CT angiogram. Carotid ultrasound showed occlusion of the left ICA but there appeared to be flow in the left ICA with improved caliber compared to previous CTA.  However repeat CTA showed persistent occlusion of the left common carotid internal carotid artery with minimal contrast filling in the left ICA terminus.  Multifocal atherosclerotic calcification in the left MCA cortical branches with moderate to severe stenosis at the origin of the right vertebral artery.  I have recommended:    1.  Continue enteric-coated aspirin 325 mg daily and Lipitor 80 mg daily.  2.  Keep LDL less than 70  3.  Patient is prediabetic and I have asked him to work with his primary care physician to manage his prediabetes  4.  He continues to smoke and I have encouraged him to quit smoking  5.  Vascular risk factors modification: Healthy diet (fruits, vegetables, low fat dairy & reduced saturated fat), weight loss, exercise at least 30 minutes 5 days/week, BMI goal <25.  Keep systolic blood pressure goal <130.  LDL goal <70.  Hemoglobin A1c goal <7. If applicable, STOP smoking  6.  Follow-up on as needed basis      Thank you again for this referral, please feel free to contact me  if you have any questions.    Neal Costello MD  [facility]     HISTORY OF PRESENT ILLNESS     Patient is a 59-year-old male with history of cigarette smoking, HLD, prediabetes, EDUARDO, left carotid occlusion, left MCA infarction on 6/11/2023 with remarkable improvement last seen on 6/13/2024 who returns for follow-up.  During his last visit he was told to continue on aspirin 325 mg daily and Lipitor 80 mg daily.  Lipid panel was repeated and his LDL was 73.  Hemoglobin A1c was 6.2.      Patient continued to smoke and was strongly encouraged to quit smoking.  Repeat carotid ultrasound and a CTA were done.  Carotid ultrasound showed occlusion of the left ICA but there appeared to be flow in the left ICA with improved caliber compared to previous CTA.  However repeat CTA showed persistent occlusion of the left common carotid internal carotid artery with minimal contrast filling in the left ICA terminus.  Multifocal atherosclerotic calcification in the left MCA cortical branches with moderate to severe stenosis at the origin of the right vertebral artery.  Patient was referred to sleep as there was a concern about sleep apnea but they kept on changing the appointment and he was quite upset about it.  He was eventually seen on 12/18/2023 and diagnosed with obstructive sleep apnea and in-lab sleep study was recommended.  However he claims he stopped snoring has not followed up with sleep     PROBLEM LIST   Patient Active Problem List   Diagnosis    Carotid artery occlusion with cerebral infarction (H)    Occlusion of left carotid artery    H/O Lt MCA infarction    History of tobacco abuse    Obstructive sleep apnea    Cecal cancer (H)    Prediabetes    Intracranial atherosclerosis         PAST MEDICAL & SURGICAL HISTORY     Past Medical History:   Patient  has a past medical history of Herniated lumbar disc without myelopathy.    Surgical History:  He  has no past surgical history on file.      SOCIAL HISTORY     Reviewed, and he  reports that he has been smoking cigarettes. He does not have any smokeless tobacco history on file. He reports current alcohol use. He reports that he does not use drugs.     FAMILY HISTORY     Reviewed, and family history includes Family History Negative in his father and mother.     ALLERGIES     Allergies   Allergen Reactions    Morphine Unknown     Takes hydrocodone without problem.         REVIEW OF SYSTEMS     A 12 point review of system was performed and was negative except as outlined in the history of present illness.     HOME MEDICATIONS     Current Outpatient Rx   Medication Sig Dispense Refill    albuterol (PROAIR HFA/PROVENTIL HFA/VENTOLIN HFA) 108 (90 Base) MCG/ACT inhaler Inhale 2 puffs into the lungs every 6 hours as needed for shortness of breath, wheezing or cough      aspirin (ASA) 325 MG EC tablet Take 1 tablet (325 mg) by mouth daily. 90 tablet 3    atorvastatin (LIPITOR) 80 MG tablet Take 1 tablet (80 mg) by mouth every evening. 90 tablet 3    chlorhexidine (PERIDEX) 0.12 % solution       HYDROcodone-acetaminophen (NORCO)  MG per tablet       hydrOXYzine (ATARAX) 25 MG tablet Take 25 mg by mouth 3 times daily as needed for anxiety      nicotine (COMMIT) 2 MG lozenge Place 1 lozenge (2 mg) inside cheek every hour as needed for other (nicotine withdrawal symptoms) 72 lozenge 0    nicotine (NICODERM CQ) 21 MG/24HR 24 hr patch Place 1 patch onto the skin every 24 hours 28 patch 0    QUEtiapine (SEROQUEL) 50 MG tablet Take  mg by mouth At Bedtime      tadalafil (CIALIS) 10 MG tablet Take 10 mg by mouth      terazosin (HYTRIN) 10 MG capsule Take 10 mg by mouth daily      ibuprofen (ADVIL/MOTRIN) 800 MG tablet Take 800 mg by mouth every 8 hours as needed for moderate pain      ipratropium-albuterol (COMBIVENT RESPIMAT)  MCG/ACT inhaler Inhale 1 puff into the lungs 4 times daily for 30 days 1 each 0    NARCAN 4 MG/0.1ML nasal spray USE AS  "NEEDED FOR OPIOID OVERDOSE PER INSTRUCTION           PHYSICAL EXAM     Vital signs  Ht 1.727 m (5' 8\")   Wt 98.4 kg (217 lb)   BMI 32.99 kg/m      Weight:   217 lbs 0 oz    Middle-age obese gentleman who is alert and oriented vital signs are reviewed and documented in electronic medical record. Neck supple. Neurologically he has difficulty with naming parts of objects and expressing himself at time comprehension is intact he can read and repeat. He has normal mass and tone 5/5 strength reflexes 1+ toes downgoing. No dysmetria noted on finger-nose testing gait normal Romberg negative.      PERTINENT DIAGNOSTIC STUDIES     Following studies were reviewed:     CTA HEAD AND NECK 7/2/2024  1. Persistent occlusion of the left common carotid artery and internal  carotid artery with minimal contrast filling in the left ICA terminus.  2. Unchanged constitution of left ECA, likely through collaterals.  3. Multifocal atherosclerotic calcifications in the left MCA cortical  branches.  3. Unchanged moderate to severe stenosis at the origin of the right  vertebral artery.    CTA HEAD AND NECK 6/13/2023  HEAD CTA:  1.  Decreased filling of anterior division M2 middle cerebral artery  branches on the left versus 6/11/2023 consistent with vessel  occlusion. This generally corresponds to the area of infarct  identified on CT and MRI.  2.  The more proximal M1 segment of the left middle cerebral artery  and posterior division M2 branches appear patent.  3.  The proximal intracranial segment of the left internal carotid  artery remains occluded until the level of the clinoid process,  possibly slightly progressed.     NECK CTA:  1.  Unchanged occlusion of the left common and internal carotid  arteries. Filling of the external branches is presumably via  collaterals.  2.  Mild right carotid artery atherosclerosis without significant  narrowing.  3.  Moderate to severe narrowing at the origin of the right vertebral  artery unchanged. " No dissection.     MRI BRAIN 6/13/2023  1.  Evolving areas of recent infarct in the anterior left middle  cerebral artery territory affecting the frontal lobe and insula. The  areas of ischemic tissue are slightly more confluent but overall  unchanged in size.  2.  No area of new infarct.  3.  No hemorrhage.     ECHOCARDIOGRAM 6/11/2023  No cardiac source of emboli noted     CARDIAC EVENT MONITOR 12/15/2023  Normal sinus rhythm.  No cardiac arrhythmias    CAROTID ULTRASOUND 6/18/2024  1.  Mild plaque formation, velocities consistent with less than 50% stenosis in the right internal carotid artery.  2.  Occlusion of left common carotid artery and external carotid artery. There appears to be flow in the left internal carotid artery with improved caliber when compared to CTA from 6/13/2023. Consider repeat CTA for further evaluation.  3.  Flow within the vertebral arteries is antegrade.         PERTINENT LABS  Following labs were reviewed:  No visits with results within 3 Month(s) from this visit.   Latest known visit with results is:   Lab on 06/19/2024   Component Date Value Ref Range Status    Cholesterol 06/19/2024 139  <200 mg/dL Final    Triglycerides 06/19/2024 91  <150 mg/dL Final    Direct Measure HDL 06/19/2024 48  >=40 mg/dL Final    LDL Cholesterol Calculated 06/19/2024 73  <=100 mg/dL Final    Non HDL Cholesterol 06/19/2024 91  <130 mg/dL Final    Patient Fasting > 8hrs? 06/19/2024 Yes   Final    Hemoglobin A1C 06/19/2024 6.2 (H)  0.0 - 5.6 % Final         Total time spent for face to face visit, reviewing labs/imaging studies, counseling and coordination of care was: 20 Minutes spent on the date of the encounter doing chart review, review of outside records, review of test results, interpretation of tests, patient visit, documentation, and discussion with family     The longitudinal plan of care for the diagnosis(es)/condition(s) as documented were addressed during this visit. Due to the added complexity  in care, I will continue to support Dinesh in the subsequent management and with ongoing continuity of care.    This note was dictated using voice recognition software.  Any grammatical or context distortions are unintentional and inherent to the software.    No orders of the defined types were placed in this encounter.     New Prescriptions    No medications on file     Modified Medications    Modified Medication Previous Medication    ASPIRIN (ASA) 325 MG EC TABLET aspirin (ASA) 325 MG EC tablet       Take 1 tablet (325 mg) by mouth daily.    Take 1 tablet (325 mg) by mouth daily    ATORVASTATIN (LIPITOR) 80 MG TABLET atorvastatin (LIPITOR) 80 MG tablet       Take 1 tablet (80 mg) by mouth every evening.    Take 1 tablet (80 mg) by mouth every evening

## 2025-03-26 ENCOUNTER — OFFICE VISIT (OUTPATIENT)
Dept: NEUROLOGY | Facility: CLINIC | Age: 60
End: 2025-03-26
Payer: MEDICARE

## 2025-03-26 VITALS — HEIGHT: 68 IN | BODY MASS INDEX: 32.89 KG/M2 | WEIGHT: 217 LBS

## 2025-03-26 DIAGNOSIS — R73.03 PREDIABETES: ICD-10-CM

## 2025-03-26 DIAGNOSIS — Z86.73 HISTORY OF STROKE: Primary | ICD-10-CM

## 2025-03-26 DIAGNOSIS — I65.22 OCCLUSION OF LEFT CAROTID ARTERY: ICD-10-CM

## 2025-03-26 PROCEDURE — G2211 COMPLEX E/M VISIT ADD ON: HCPCS | Performed by: PSYCHIATRY & NEUROLOGY

## 2025-03-26 PROCEDURE — 99213 OFFICE O/P EST LOW 20 MIN: CPT | Performed by: PSYCHIATRY & NEUROLOGY

## 2025-03-26 RX ORDER — ASPIRIN 325 MG
325 TABLET, DELAYED RELEASE (ENTERIC COATED) ORAL DAILY
Qty: 90 TABLET | Refills: 3 | Status: SHIPPED | OUTPATIENT
Start: 2025-03-26

## 2025-03-26 RX ORDER — ATORVASTATIN CALCIUM 80 MG/1
80 TABLET, FILM COATED ORAL EVERY EVENING
Qty: 90 TABLET | Refills: 3 | Status: SHIPPED | OUTPATIENT
Start: 2025-03-26

## 2025-03-26 NOTE — LETTER
3/26/2025      Dinesh Farfan  [address] [place] [address]      Dear Colleague,    Thank you for referring your patient, Dinesh Farfan, to the [facility] NEUROLOGY [facility] [place]. Please see a copy of my visit note below.    NEUROLOGY FOLLOW UP VISIT  NOTE       [facility] NEUROLOGY [place]  [address] [place] [address]  Tel: [phone]  Fax: [phone]  [email or web address]     Dinesh Farfan,  1965, MRN [id number]  PCP: Gian Hoffman  Date: 2025      ASSESSMENT & PLAN     Visit Diagnosis  H/O Lt MCA infarction  Intracranial atherosclerosis  Occlusion of left carotid artery     H/O left MCA infarction; Lt ICA occlusion  59-year-old male with history of cigarette smoking, HLD, prediabetes, EDUARDO, left carotid occlusion who was admitted to [facility] on 2023 for left MCA infarction and was noted to have left ICA occlusion.  He has made quite a remarkable improvement.  Since his last visit he had a repeat carotid ultrasound and CT angiogram. Carotid ultrasound showed occlusion of the left ICA but there appeared to be flow in the left ICA with improved caliber compared to previous CTA.  However repeat CTA showed persistent occlusion of the left common carotid internal carotid artery with minimal contrast filling in the left ICA terminus.  Multifocal atherosclerotic calcification in the left MCA cortical branches with moderate to severe stenosis at the origin of the right vertebral artery.  I have recommended:    1.  Continue enteric-coated aspirin 325 mg daily and Lipitor 80 mg daily.  2.  Keep LDL less than 70  3.  Patient is prediabetic and I have asked him to work with his primary care physician to manage his prediabetes  4.  He continues to smoke and I have encouraged him to quit smoking  5.  Vascular risk factors modification: Healthy diet (fruits, vegetables, low fat dairy & reduced saturated fat), weight loss, exercise at  least 30 minutes 5 days/week, BMI goal <25.  Keep systolic blood pressure goal <130.  LDL goal <70.  Hemoglobin A1c goal <7. If applicable, STOP smoking  6.  Follow-up on as needed basis      Thank you again for this referral, please feel free to contact me if you have any questions.    Neal Costello MD  [facility]     HISTORY OF PRESENT ILLNESS     Patient is a 59-year-old male with history of cigarette smoking, HLD, prediabetes, EDUARDO, left carotid occlusion, left MCA infarction on 6/11/2023 with remarkable improvement last seen on 6/13/2024 who returns for follow-up.  During his last visit he was told to continue on aspirin 325 mg daily and Lipitor 80 mg daily.  Lipid panel was repeated and his LDL was 73.  Hemoglobin A1c was 6.2.      Patient continued to smoke and was strongly encouraged to quit smoking.  Repeat carotid ultrasound and a CTA were done.  Carotid ultrasound showed occlusion of the left ICA but there appeared to be flow in the left ICA with improved caliber compared to previous CTA.  However repeat CTA showed persistent occlusion of the left common carotid internal carotid artery with minimal contrast filling in the left ICA terminus.  Multifocal atherosclerotic calcification in the left MCA cortical branches with moderate to severe stenosis at the origin of the right vertebral artery.  Patient was referred to sleep as there was a concern about sleep apnea but they kept on changing the appointment and he was quite upset about it.  He was eventually seen on 12/18/2023 and diagnosed with obstructive sleep apnea and in-lab sleep study was recommended.  However he claims he stopped snoring has not followed up with sleep     PROBLEM LIST   Patient Active Problem List   Diagnosis     Carotid artery occlusion with cerebral infarction (H)     Occlusion of left carotid artery     H/O Lt MCA infarction     History of tobacco abuse     Obstructive sleep apnea     Cecal cancer (H)      Prediabetes     Intracranial atherosclerosis         PAST MEDICAL & SURGICAL HISTORY     Past Medical History:   Patient  has a past medical history of Herniated lumbar disc without myelopathy.    Surgical History:  He  has no past surgical history on file.     SOCIAL HISTORY     Reviewed, and he  reports that he has been smoking cigarettes. He does not have any smokeless tobacco history on file. He reports current alcohol use. He reports that he does not use drugs.     FAMILY HISTORY     Reviewed, and family history includes Family History Negative in his father and mother.     ALLERGIES     Allergies   Allergen Reactions     Morphine Unknown     Takes hydrocodone without problem.         REVIEW OF SYSTEMS     A 12 point review of system was performed and was negative except as outlined in the history of present illness.     HOME MEDICATIONS     Current Outpatient Rx   Medication Sig Dispense Refill     albuterol (PROAIR HFA/PROVENTIL HFA/VENTOLIN HFA) 108 (90 Base) MCG/ACT inhaler Inhale 2 puffs into the lungs every 6 hours as needed for shortness of breath, wheezing or cough       aspirin (ASA) 325 MG EC tablet Take 1 tablet (325 mg) by mouth daily. 90 tablet 3     atorvastatin (LIPITOR) 80 MG tablet Take 1 tablet (80 mg) by mouth every evening. 90 tablet 3     chlorhexidine (PERIDEX) 0.12 % solution        HYDROcodone-acetaminophen (NORCO)  MG per tablet        hydrOXYzine (ATARAX) 25 MG tablet Take 25 mg by mouth 3 times daily as needed for anxiety       nicotine (COMMIT) 2 MG lozenge Place 1 lozenge (2 mg) inside cheek every hour as needed for other (nicotine withdrawal symptoms) 72 lozenge 0     nicotine (NICODERM CQ) 21 MG/24HR 24 hr patch Place 1 patch onto the skin every 24 hours 28 patch 0     QUEtiapine (SEROQUEL) 50 MG tablet Take  mg by mouth At Bedtime       tadalafil (CIALIS) 10 MG tablet Take 10 mg by mouth       terazosin (HYTRIN) 10 MG capsule Take 10 mg by mouth daily       ibuprofen  "(ADVIL/MOTRIN) 800 MG tablet Take 800 mg by mouth every 8 hours as needed for moderate pain       ipratropium-albuterol (COMBIVENT RESPIMAT)  MCG/ACT inhaler Inhale 1 puff into the lungs 4 times daily for 30 days 1 each 0     NARCAN 4 MG/0.1ML nasal spray USE AS NEEDED FOR OPIOID OVERDOSE PER INSTRUCTION           PHYSICAL EXAM     Vital signs  Ht 1.727 m (5' 8\")   Wt 98.4 kg (217 lb)   BMI 32.99 kg/m      Weight:   217 lbs 0 oz    Middle-age obese gentleman who is alert and oriented vital signs are reviewed and documented in electronic medical record. Neck supple. Neurologically he has difficulty with naming parts of objects and expressing himself at time comprehension is intact he can read and repeat. He has normal mass and tone 5/5 strength reflexes 1+ toes downgoing. No dysmetria noted on finger-nose testing gait normal Romberg negative.      PERTINENT DIAGNOSTIC STUDIES     Following studies were reviewed:     CTA HEAD AND NECK 7/2/2024  1. Persistent occlusion of the left common carotid artery and internal  carotid artery with minimal contrast filling in the left ICA terminus.  2. Unchanged constitution of left ECA, likely through collaterals.  3. Multifocal atherosclerotic calcifications in the left MCA cortical  branches.  3. Unchanged moderate to severe stenosis at the origin of the right  vertebral artery.    CTA HEAD AND NECK 6/13/2023  HEAD CTA:  1.  Decreased filling of anterior division M2 middle cerebral artery  branches on the left versus 6/11/2023 consistent with vessel  occlusion. This generally corresponds to the area of infarct  identified on CT and MRI.  2.  The more proximal M1 segment of the left middle cerebral artery  and posterior division M2 branches appear patent.  3.  The proximal intracranial segment of the left internal carotid  artery remains occluded until the level of the clinoid process,  possibly slightly progressed.     NECK CTA:  1.  Unchanged occlusion of the left " common and internal carotid  arteries. Filling of the external branches is presumably via  collaterals.  2.  Mild right carotid artery atherosclerosis without significant  narrowing.  3.  Moderate to severe narrowing at the origin of the right vertebral  artery unchanged. No dissection.     MRI BRAIN 6/13/2023  1.  Evolving areas of recent infarct in the anterior left middle  cerebral artery territory affecting the frontal lobe and insula. The  areas of ischemic tissue are slightly more confluent but overall  unchanged in size.  2.  No area of new infarct.  3.  No hemorrhage.     ECHOCARDIOGRAM 6/11/2023  No cardiac source of emboli noted     CARDIAC EVENT MONITOR 12/15/2023  Normal sinus rhythm.  No cardiac arrhythmias    CAROTID ULTRASOUND 6/18/2024  1.  Mild plaque formation, velocities consistent with less than 50% stenosis in the right internal carotid artery.  2.  Occlusion of left common carotid artery and external carotid artery. There appears to be flow in the left internal carotid artery with improved caliber when compared to CTA from 6/13/2023. Consider repeat CTA for further evaluation.  3.  Flow within the vertebral arteries is antegrade.         PERTINENT LABS  Following labs were reviewed:  No visits with results within 3 Month(s) from this visit.   Latest known visit with results is:   Lab on 06/19/2024   Component Date Value Ref Range Status     Cholesterol 06/19/2024 139  <200 mg/dL Final     Triglycerides 06/19/2024 91  <150 mg/dL Final     Direct Measure HDL 06/19/2024 48  >=40 mg/dL Final     LDL Cholesterol Calculated 06/19/2024 73  <=100 mg/dL Final     Non HDL Cholesterol 06/19/2024 91  <130 mg/dL Final     Patient Fasting > 8hrs? 06/19/2024 Yes   Final     Hemoglobin A1C 06/19/2024 6.2 (H)  0.0 - 5.6 % Final         Total time spent for face to face visit, reviewing labs/imaging studies, counseling and coordination of care was: 20 Minutes spent on the date of the encounter doing chart  review, review of outside records, review of test results, interpretation of tests, patient visit, documentation, and discussion with family     The longitudinal plan of care for the diagnosis(es)/condition(s) as documented were addressed during this visit. Due to the added complexity in care, I will continue to support Dinesh in the subsequent management and with ongoing continuity of care.    This note was dictated using voice recognition software.  Any grammatical or context distortions are unintentional and inherent to the software.    No orders of the defined types were placed in this encounter.     New Prescriptions    No medications on file     Modified Medications    Modified Medication Previous Medication    ASPIRIN (ASA) 325 MG EC TABLET aspirin (ASA) 325 MG EC tablet       Take 1 tablet (325 mg) by mouth daily.    Take 1 tablet (325 mg) by mouth daily    ATORVASTATIN (LIPITOR) 80 MG TABLET atorvastatin (LIPITOR) 80 MG tablet       Take 1 tablet (80 mg) by mouth every evening.    Take 1 tablet (80 mg) by mouth every evening                 Again, thank you for allowing me to participate in the care of your patient.        Sincerely,        Neal Costello MD    Electronically signed

## 2025-04-29 ENCOUNTER — APPOINTMENT (OUTPATIENT)
Dept: CT IMAGING | Facility: CLINIC | Age: 60
End: 2025-04-29
Attending: EMERGENCY MEDICINE
Payer: MEDICARE

## 2025-04-29 ENCOUNTER — HOSPITAL ENCOUNTER (OUTPATIENT)
Facility: CLINIC | Age: 60
Setting detail: OBSERVATION
Discharge: HOME | End: 2025-04-30
Attending: EMERGENCY MEDICINE | Admitting: INTERNAL MEDICINE
Payer: MEDICARE

## 2025-04-29 DIAGNOSIS — R52 INTRACTABLE PAIN: ICD-10-CM

## 2025-04-29 DIAGNOSIS — N20.0 KIDNEY STONE: ICD-10-CM

## 2025-04-29 LAB
ALBUMIN UR-MCNC: 70 MG/DL
ANION GAP SERPL CALCULATED.3IONS-SCNC: 12 MMOL/L (ref 7–15)
ANION GAP SERPL CALCULATED.3IONS-SCNC: 9 MMOL/L (ref 7–15)
APPEARANCE UR: CLEAR
BASOPHILS # BLD AUTO: 0 10E3/UL (ref 0–0.2)
BASOPHILS NFR BLD AUTO: 1 %
BILIRUB UR QL STRIP: NEGATIVE
BUN SERPL-MCNC: 24.6 MG/DL (ref 8–23)
BUN SERPL-MCNC: 24.8 MG/DL (ref 8–23)
CALCIUM SERPL-MCNC: 9.2 MG/DL (ref 8.8–10.4)
CALCIUM SERPL-MCNC: 9.4 MG/DL (ref 8.8–10.4)
CHLORIDE SERPL-SCNC: 103 MMOL/L (ref 98–107)
CHLORIDE SERPL-SCNC: 104 MMOL/L (ref 98–107)
COLOR UR AUTO: ABNORMAL
CREAT SERPL-MCNC: 0.97 MG/DL (ref 0.67–1.17)
CREAT SERPL-MCNC: 1 MG/DL (ref 0.67–1.17)
EGFRCR SERPLBLD CKD-EPI 2021: 87 ML/MIN/1.73M2
EGFRCR SERPLBLD CKD-EPI 2021: 90 ML/MIN/1.73M2
EOSINOPHIL # BLD AUTO: 0.1 10E3/UL (ref 0–0.7)
EOSINOPHIL NFR BLD AUTO: 2 %
ERYTHROCYTE [DISTWIDTH] IN BLOOD BY AUTOMATED COUNT: 12.7 % (ref 10–15)
ERYTHROCYTE [DISTWIDTH] IN BLOOD BY AUTOMATED COUNT: 12.8 % (ref 10–15)
GLUCOSE SERPL-MCNC: 128 MG/DL (ref 70–99)
GLUCOSE SERPL-MCNC: 161 MG/DL (ref 70–99)
GLUCOSE UR STRIP-MCNC: NEGATIVE MG/DL
HCO3 SERPL-SCNC: 26 MMOL/L (ref 22–29)
HCO3 SERPL-SCNC: 27 MMOL/L (ref 22–29)
HCT VFR BLD AUTO: 39.8 % (ref 40–53)
HCT VFR BLD AUTO: 42.2 % (ref 40–53)
HGB BLD-MCNC: 13.6 G/DL (ref 13.3–17.7)
HGB BLD-MCNC: 14.4 G/DL (ref 13.3–17.7)
HGB UR QL STRIP: ABNORMAL
IMM GRANULOCYTES # BLD: 0 10E3/UL
IMM GRANULOCYTES NFR BLD: 0 %
KETONES UR STRIP-MCNC: NEGATIVE MG/DL
LEUKOCYTE ESTERASE UR QL STRIP: NEGATIVE
LYMPHOCYTES # BLD AUTO: 2.4 10E3/UL (ref 0.8–5.3)
LYMPHOCYTES NFR BLD AUTO: 36 %
MCH RBC QN AUTO: 30.9 PG (ref 26.5–33)
MCH RBC QN AUTO: 31.1 PG (ref 26.5–33)
MCHC RBC AUTO-ENTMCNC: 34.1 G/DL (ref 31.5–36.5)
MCHC RBC AUTO-ENTMCNC: 34.2 G/DL (ref 31.5–36.5)
MCV RBC AUTO: 91 FL (ref 78–100)
MCV RBC AUTO: 91 FL (ref 78–100)
MONOCYTES # BLD AUTO: 0.6 10E3/UL (ref 0–1.3)
MONOCYTES NFR BLD AUTO: 9 %
MUCOUS THREADS #/AREA URNS LPF: PRESENT /LPF
NEUTROPHILS # BLD AUTO: 3.6 10E3/UL (ref 1.6–8.3)
NEUTROPHILS NFR BLD AUTO: 53 %
NITRATE UR QL: NEGATIVE
NRBC # BLD AUTO: 0 10E3/UL
NRBC BLD AUTO-RTO: 0 /100
PH UR STRIP: 6 [PH] (ref 5–7)
PLATELET # BLD AUTO: 173 10E3/UL (ref 150–450)
PLATELET # BLD AUTO: 189 10E3/UL (ref 150–450)
POTASSIUM SERPL-SCNC: 4.3 MMOL/L (ref 3.4–5.3)
POTASSIUM SERPL-SCNC: 4.8 MMOL/L (ref 3.4–5.3)
RBC # BLD AUTO: 4.38 10E6/UL (ref 4.4–5.9)
RBC # BLD AUTO: 4.66 10E6/UL (ref 4.4–5.9)
RBC URINE: >182 /HPF
SODIUM SERPL-SCNC: 139 MMOL/L (ref 135–145)
SODIUM SERPL-SCNC: 142 MMOL/L (ref 135–145)
SP GR UR STRIP: 1.02 (ref 1–1.03)
UROBILINOGEN UR STRIP-MCNC: NORMAL MG/DL
WBC # BLD AUTO: 6.7 10E3/UL (ref 4–11)
WBC # BLD AUTO: 9.1 10E3/UL (ref 4–11)
WBC URINE: 0 /HPF

## 2025-04-29 PROCEDURE — 250N000013 HC RX MED GY IP 250 OP 250 PS 637: Performed by: PHYSICIAN ASSISTANT

## 2025-04-29 PROCEDURE — 258N000003 HC RX IP 258 OP 636: Performed by: INTERNAL MEDICINE

## 2025-04-29 PROCEDURE — 81001 URINALYSIS AUTO W/SCOPE: CPT | Performed by: EMERGENCY MEDICINE

## 2025-04-29 PROCEDURE — 250N000011 HC RX IP 250 OP 636: Performed by: EMERGENCY MEDICINE

## 2025-04-29 PROCEDURE — 96376 TX/PRO/DX INJ SAME DRUG ADON: CPT

## 2025-04-29 PROCEDURE — 74177 CT ABD & PELVIS W/CONTRAST: CPT

## 2025-04-29 PROCEDURE — 85025 COMPLETE CBC W/AUTO DIFF WBC: CPT | Performed by: EMERGENCY MEDICINE

## 2025-04-29 PROCEDURE — 99207 PR NO BILLABLE SERVICE THIS VISIT: CPT | Performed by: UROLOGY

## 2025-04-29 PROCEDURE — 99285 EMERGENCY DEPT VISIT HI MDM: CPT | Mod: 25

## 2025-04-29 PROCEDURE — 250N000013 HC RX MED GY IP 250 OP 250 PS 637: Performed by: INTERNAL MEDICINE

## 2025-04-29 PROCEDURE — 250N000011 HC RX IP 250 OP 636: Mod: JZ | Performed by: EMERGENCY MEDICINE

## 2025-04-29 PROCEDURE — 36415 COLL VENOUS BLD VENIPUNCTURE: CPT | Performed by: EMERGENCY MEDICINE

## 2025-04-29 PROCEDURE — 250N000009 HC RX 250: Performed by: EMERGENCY MEDICINE

## 2025-04-29 PROCEDURE — 96375 TX/PRO/DX INJ NEW DRUG ADDON: CPT

## 2025-04-29 PROCEDURE — 80048 BASIC METABOLIC PNL TOTAL CA: CPT | Performed by: INTERNAL MEDICINE

## 2025-04-29 PROCEDURE — 99222 1ST HOSP IP/OBS MODERATE 55: CPT | Mod: AI | Performed by: INTERNAL MEDICINE

## 2025-04-29 PROCEDURE — 96374 THER/PROPH/DIAG INJ IV PUSH: CPT | Mod: 54

## 2025-04-29 PROCEDURE — 85027 COMPLETE CBC AUTOMATED: CPT | Performed by: INTERNAL MEDICINE

## 2025-04-29 PROCEDURE — 36415 COLL VENOUS BLD VENIPUNCTURE: CPT | Performed by: INTERNAL MEDICINE

## 2025-04-29 PROCEDURE — 99207 PR NO BILLABLE SERVICE THIS VISIT: CPT | Performed by: PHYSICIAN ASSISTANT

## 2025-04-29 PROCEDURE — G0378 HOSPITAL OBSERVATION PER HR: HCPCS

## 2025-04-29 PROCEDURE — 82565 ASSAY OF CREATININE: CPT | Performed by: EMERGENCY MEDICINE

## 2025-04-29 RX ORDER — QUETIAPINE FUMARATE 50 MG/1
100 TABLET, FILM COATED ORAL AT BEDTIME
Status: DISCONTINUED | OUTPATIENT
Start: 2025-04-29 | End: 2025-04-30 | Stop reason: HOSPADM

## 2025-04-29 RX ORDER — ONDANSETRON 4 MG/1
4 TABLET, ORALLY DISINTEGRATING ORAL EVERY 6 HOURS PRN
Status: DISCONTINUED | OUTPATIENT
Start: 2025-04-29 | End: 2025-04-30 | Stop reason: HOSPADM

## 2025-04-29 RX ORDER — HYDROMORPHONE HYDROCHLORIDE 1 MG/ML
0.5 INJECTION, SOLUTION INTRAMUSCULAR; INTRAVENOUS; SUBCUTANEOUS EVERY 4 HOURS PRN
Status: DISCONTINUED | OUTPATIENT
Start: 2025-04-29 | End: 2025-04-29

## 2025-04-29 RX ORDER — NALOXONE HYDROCHLORIDE 0.4 MG/ML
0.4 INJECTION, SOLUTION INTRAMUSCULAR; INTRAVENOUS; SUBCUTANEOUS
Status: DISCONTINUED | OUTPATIENT
Start: 2025-04-29 | End: 2025-04-30 | Stop reason: HOSPADM

## 2025-04-29 RX ORDER — AMOXICILLIN 250 MG
2 CAPSULE ORAL 2 TIMES DAILY PRN
Status: DISCONTINUED | OUTPATIENT
Start: 2025-04-29 | End: 2025-04-30 | Stop reason: HOSPADM

## 2025-04-29 RX ORDER — QUETIAPINE FUMARATE 50 MG/1
50 TABLET, FILM COATED ORAL AT BEDTIME
Status: DISCONTINUED | OUTPATIENT
Start: 2025-04-29 | End: 2025-04-29

## 2025-04-29 RX ORDER — ACETAMINOPHEN 325 MG/1
650 TABLET ORAL EVERY 4 HOURS PRN
Status: DISCONTINUED | OUTPATIENT
Start: 2025-04-29 | End: 2025-04-30 | Stop reason: HOSPADM

## 2025-04-29 RX ORDER — DULOXETIN HYDROCHLORIDE 60 MG/1
60 CAPSULE, DELAYED RELEASE ORAL DAILY
COMMUNITY
Start: 2025-04-16

## 2025-04-29 RX ORDER — IOPAMIDOL 755 MG/ML
500 INJECTION, SOLUTION INTRAVASCULAR ONCE
Status: COMPLETED | OUTPATIENT
Start: 2025-04-29 | End: 2025-04-29

## 2025-04-29 RX ORDER — BUDESONIDE AND FORMOTEROL FUMARATE DIHYDRATE 160; 4.5 UG/1; UG/1
2 AEROSOL RESPIRATORY (INHALATION) 2 TIMES DAILY
COMMUNITY
Start: 2025-04-25

## 2025-04-29 RX ORDER — ATORVASTATIN CALCIUM 40 MG/1
80 TABLET, FILM COATED ORAL EVERY EVENING
Status: DISCONTINUED | OUTPATIENT
Start: 2025-04-29 | End: 2025-04-30 | Stop reason: HOSPADM

## 2025-04-29 RX ORDER — NALOXONE HYDROCHLORIDE 0.4 MG/ML
0.2 INJECTION, SOLUTION INTRAMUSCULAR; INTRAVENOUS; SUBCUTANEOUS
Status: DISCONTINUED | OUTPATIENT
Start: 2025-04-29 | End: 2025-04-30 | Stop reason: HOSPADM

## 2025-04-29 RX ORDER — TERAZOSIN 5 MG/1
10 CAPSULE ORAL AT BEDTIME
Status: DISCONTINUED | OUTPATIENT
Start: 2025-04-29 | End: 2025-04-30 | Stop reason: HOSPADM

## 2025-04-29 RX ORDER — HYDROMORPHONE HYDROCHLORIDE 1 MG/ML
0.5 INJECTION, SOLUTION INTRAMUSCULAR; INTRAVENOUS; SUBCUTANEOUS EVERY 30 MIN PRN
Status: DISCONTINUED | OUTPATIENT
Start: 2025-04-29 | End: 2025-04-29

## 2025-04-29 RX ORDER — NALOXONE HYDROCHLORIDE 0.4 MG/ML
0.4 INJECTION, SOLUTION INTRAMUSCULAR; INTRAVENOUS; SUBCUTANEOUS
Status: DISCONTINUED | OUTPATIENT
Start: 2025-04-29 | End: 2025-04-29

## 2025-04-29 RX ORDER — SENNOSIDES 8.6 MG
325 CAPSULE ORAL DAILY
Status: DISCONTINUED | OUTPATIENT
Start: 2025-04-29 | End: 2025-04-30 | Stop reason: HOSPADM

## 2025-04-29 RX ORDER — HYDROMORPHONE HYDROCHLORIDE 2 MG/1
4 TABLET ORAL EVERY 4 HOURS PRN
Status: DISCONTINUED | OUTPATIENT
Start: 2025-04-29 | End: 2025-04-29

## 2025-04-29 RX ORDER — NALOXONE HYDROCHLORIDE 0.4 MG/ML
0.2 INJECTION, SOLUTION INTRAMUSCULAR; INTRAVENOUS; SUBCUTANEOUS
Status: DISCONTINUED | OUTPATIENT
Start: 2025-04-29 | End: 2025-04-29

## 2025-04-29 RX ORDER — HYDROMORPHONE HYDROCHLORIDE 2 MG/1
4 TABLET ORAL
Status: DISCONTINUED | OUTPATIENT
Start: 2025-04-29 | End: 2025-04-30 | Stop reason: HOSPADM

## 2025-04-29 RX ORDER — ONDANSETRON 2 MG/ML
4 INJECTION INTRAMUSCULAR; INTRAVENOUS ONCE
Status: COMPLETED | OUTPATIENT
Start: 2025-04-29 | End: 2025-04-29

## 2025-04-29 RX ORDER — KETOROLAC TROMETHAMINE 15 MG/ML
15 INJECTION, SOLUTION INTRAMUSCULAR; INTRAVENOUS ONCE
Status: COMPLETED | OUTPATIENT
Start: 2025-04-29 | End: 2025-04-29

## 2025-04-29 RX ORDER — ONDANSETRON 2 MG/ML
4 INJECTION INTRAMUSCULAR; INTRAVENOUS EVERY 6 HOURS PRN
Status: DISCONTINUED | OUTPATIENT
Start: 2025-04-29 | End: 2025-04-30 | Stop reason: HOSPADM

## 2025-04-29 RX ORDER — HYDROXYZINE HYDROCHLORIDE 25 MG/1
25 TABLET, FILM COATED ORAL EVERY 6 HOURS PRN
Status: DISCONTINUED | OUTPATIENT
Start: 2025-04-29 | End: 2025-04-30 | Stop reason: HOSPADM

## 2025-04-29 RX ORDER — PROCHLORPERAZINE MALEATE 5 MG/1
10 TABLET ORAL EVERY 6 HOURS PRN
Status: DISCONTINUED | OUTPATIENT
Start: 2025-04-29 | End: 2025-04-30 | Stop reason: HOSPADM

## 2025-04-29 RX ORDER — HYDROMORPHONE HYDROCHLORIDE 1 MG/ML
0.5 INJECTION, SOLUTION INTRAMUSCULAR; INTRAVENOUS; SUBCUTANEOUS ONCE
Status: COMPLETED | OUTPATIENT
Start: 2025-04-29 | End: 2025-04-29

## 2025-04-29 RX ORDER — POLYETHYLENE GLYCOL 3350 17 G
2 POWDER IN PACKET (EA) ORAL
Status: DISCONTINUED | OUTPATIENT
Start: 2025-04-29 | End: 2025-04-30 | Stop reason: HOSPADM

## 2025-04-29 RX ORDER — SODIUM CHLORIDE, SODIUM LACTATE, POTASSIUM CHLORIDE, CALCIUM CHLORIDE 600; 310; 30; 20 MG/100ML; MG/100ML; MG/100ML; MG/100ML
INJECTION, SOLUTION INTRAVENOUS CONTINUOUS
Status: DISCONTINUED | OUTPATIENT
Start: 2025-04-29 | End: 2025-04-29

## 2025-04-29 RX ORDER — HYDROMORPHONE HYDROCHLORIDE 2 MG/1
2 TABLET ORAL EVERY 4 HOURS PRN
Status: DISCONTINUED | OUTPATIENT
Start: 2025-04-29 | End: 2025-04-29

## 2025-04-29 RX ORDER — ACETAMINOPHEN 650 MG/1
650 SUPPOSITORY RECTAL EVERY 4 HOURS PRN
Status: DISCONTINUED | OUTPATIENT
Start: 2025-04-29 | End: 2025-04-30 | Stop reason: HOSPADM

## 2025-04-29 RX ORDER — AMOXICILLIN 250 MG
1 CAPSULE ORAL 2 TIMES DAILY PRN
Status: DISCONTINUED | OUTPATIENT
Start: 2025-04-29 | End: 2025-04-30 | Stop reason: HOSPADM

## 2025-04-29 RX ADMIN — ATORVASTATIN CALCIUM 80 MG: 40 TABLET, FILM COATED ORAL at 19:57

## 2025-04-29 RX ADMIN — TERAZOSIN HYDROCHLORIDE 10 MG: 5 CAPSULE ORAL at 04:54

## 2025-04-29 RX ADMIN — TERAZOSIN HYDROCHLORIDE 10 MG: 5 CAPSULE ORAL at 21:35

## 2025-04-29 RX ADMIN — HYDROMORPHONE HYDROCHLORIDE 4 MG: 2 TABLET ORAL at 04:53

## 2025-04-29 RX ADMIN — HYDROMORPHONE HYDROCHLORIDE 4 MG: 2 TABLET ORAL at 19:57

## 2025-04-29 RX ADMIN — HYDROMORPHONE HYDROCHLORIDE 0.5 MG: 1 INJECTION, SOLUTION INTRAMUSCULAR; INTRAVENOUS; SUBCUTANEOUS at 02:18

## 2025-04-29 RX ADMIN — ACETAMINOPHEN 650 MG: 325 TABLET, FILM COATED ORAL at 08:33

## 2025-04-29 RX ADMIN — SODIUM CHLORIDE 65 ML: 9 INJECTION, SOLUTION INTRAVENOUS at 02:48

## 2025-04-29 RX ADMIN — IOPAMIDOL 100 ML: 755 INJECTION, SOLUTION INTRAVENOUS at 02:48

## 2025-04-29 RX ADMIN — ONDANSETRON 4 MG: 2 INJECTION, SOLUTION INTRAMUSCULAR; INTRAVENOUS at 02:18

## 2025-04-29 RX ADMIN — HYDROMORPHONE HYDROCHLORIDE 0.5 MG: 1 INJECTION, SOLUTION INTRAMUSCULAR; INTRAVENOUS; SUBCUTANEOUS at 03:10

## 2025-04-29 RX ADMIN — KETOROLAC TROMETHAMINE 15 MG: 15 INJECTION, SOLUTION INTRAMUSCULAR; INTRAVENOUS at 02:18

## 2025-04-29 RX ADMIN — HYDROMORPHONE HYDROCHLORIDE 0.5 MG: 1 INJECTION, SOLUTION INTRAMUSCULAR; INTRAVENOUS; SUBCUTANEOUS at 02:26

## 2025-04-29 RX ADMIN — QUETIAPINE FUMARATE 100 MG: 50 TABLET ORAL at 21:35

## 2025-04-29 RX ADMIN — ASPIRIN 325 MG: 325 TABLET, COATED ORAL at 11:07

## 2025-04-29 RX ADMIN — SODIUM CHLORIDE, SODIUM LACTATE, POTASSIUM CHLORIDE, AND CALCIUM CHLORIDE: .6; .31; .03; .02 INJECTION, SOLUTION INTRAVENOUS at 04:24

## 2025-04-29 RX ADMIN — HYDROXYZINE HYDROCHLORIDE 25 MG: 25 TABLET, FILM COATED ORAL at 06:01

## 2025-04-29 RX ADMIN — ACETAMINOPHEN 650 MG: 325 TABLET, FILM COATED ORAL at 19:57

## 2025-04-29 RX ADMIN — HYDROMORPHONE HYDROCHLORIDE 4 MG: 2 TABLET ORAL at 08:33

## 2025-04-29 ASSESSMENT — COLUMBIA-SUICIDE SEVERITY RATING SCALE - C-SSRS
1. IN THE PAST MONTH, HAVE YOU WISHED YOU WERE DEAD OR WISHED YOU COULD GO TO SLEEP AND NOT WAKE UP?: NO
2. HAVE YOU ACTUALLY HAD ANY THOUGHTS OF KILLING YOURSELF IN THE PAST MONTH?: NO
6. HAVE YOU EVER DONE ANYTHING, STARTED TO DO ANYTHING, OR PREPARED TO DO ANYTHING TO END YOUR LIFE?: NO

## 2025-04-29 ASSESSMENT — ACTIVITIES OF DAILY LIVING (ADL)
ADLS_ACUITY_SCORE: 48
ADLS_ACUITY_SCORE: 54
ADLS_ACUITY_SCORE: 48
ADLS_ACUITY_SCORE: 54
ADLS_ACUITY_SCORE: 48
ADLS_ACUITY_SCORE: 48
ADLS_ACUITY_SCORE: 54
ADLS_ACUITY_SCORE: 48

## 2025-04-29 NOTE — PROGRESS NOTES
Pt admitted earlier this morning with renal colic. He has a 3 mm R obstructing ureteral stone with mild R hydronephrosis. Urology evaluated the patient and are unable to get him into a procedure today. Will continue with supportive cares, strain urine and terazosin.   If pt is able to pass stone, ok to discharge home. Otherwise, will make NPO at midnight for possible cystoscopy tomorrow.   Recheck BMP and CBC in AM.     Kellee Carter PA-C

## 2025-04-29 NOTE — H&P
[facility]       Hospitalist History & Physical     Assessment & Plan     ASSESSMENT    59M with history of cecal/appendiceal adenoCA (pMMR) s/p R hemicolectomy in remission, chronic back pain with opioid dependence, COPD with ongoing tobacco abuse, stroke, carotid artery stenosis, and BPH presents with right flank pain and found to have right ureteropelvic junction stone with mild right hydronephrosis.    PLAN    Obstructive Right Ureteral Stone w/ Hydronephrosis  -Presents with sudden onset right flank pain  -UA with hematuria  -CT abdomen pelvis with right ureteropelvic junction stone with mild right hydronephrosis  -Pain regimen in place (has opioid tolerance so may need frequent readjustments)  -LR@100ml/hr  -NPO for urology consultation    Other Issues  -Chronic COPD: As needed nebulizers  -Tobacco abuse:  cessation, nicotine replacement as needed  -Chronic back pain with opioid dependence: Pain regimen in place  -Chronic stroke & Carotid Artery Stenosis: Home aspirin and statin  -Focal narrowing of left common iliac artery: Needs smoking cessation and outpatient follow-up  -Cecal/appendiceal adenoCA (pMMR) s/p R hemicolectomy: In remission    DVT Prophy  -SCDs    Disposition  -Medically Ready for Discharge: Anticipated Tomorrow       Gareth Wolff MD    History of Present Illness     Dinesh Farfan is a 59 year old with history of chronic back pain with opioid dependence, COPD with ongoing tobacco abuse, stroke, carotid artery stenosis, and BPH presents with right flank pain.  Patient was in his normal state of health until about 1 AM when he started experiencing severe right flank pain that woke him up from sleep.  Radiates around to the center of his abdomen.  Does not radiate into the groin.  Does have mild hematuria as well.  Denies dysuria or urinary frequency.  Denies fevers or chills.  Some nausea but no vomiting.  In the ED, hypertensive but other VSS.  UA with large amount  of blood but negative nitrate and leuk esterase.  WBC 6.7.  CT abdomen pelvis with evidence of 3 mm right ureteropelvic junction stone with mild right hydronephrosis.  Difficult to get patient's pain under control with opioid pain medications so admitted for evaluation by urology.    Review of Systems     A Comprehensive greater than 10 system review of systems was carried out.  Pertinent positives and negatives are noted above.  Otherwise negative for contributory information.     Past Medical History     Past Medical History:   Diagnosis Date    Herniated lumbar disc without myelopathy      Medications     Current Outpatient Medications   Medication Sig Dispense Refill    albuterol (PROAIR HFA/PROVENTIL HFA/VENTOLIN HFA) 108 (90 Base) MCG/ACT inhaler Inhale 2 puffs into the lungs every 6 hours as needed for shortness of breath, wheezing or cough      aspirin (ASA) 325 MG EC tablet Take 1 tablet (325 mg) by mouth daily. 90 tablet 3    atorvastatin (LIPITOR) 80 MG tablet Take 1 tablet (80 mg) by mouth every evening. 90 tablet 3    chlorhexidine (PERIDEX) 0.12 % solution       HYDROcodone-acetaminophen (NORCO)  MG per tablet       hydrOXYzine (ATARAX) 25 MG tablet Take 25 mg by mouth 3 times daily as needed for anxiety      ibuprofen (ADVIL/MOTRIN) 800 MG tablet Take 800 mg by mouth every 8 hours as needed for moderate pain      ipratropium-albuterol (COMBIVENT RESPIMAT)  MCG/ACT inhaler Inhale 1 puff into the lungs 4 times daily for 30 days 1 each 0    NARCAN 4 MG/0.1ML nasal spray USE AS NEEDED FOR OPIOID OVERDOSE PER INSTRUCTION      nicotine (COMMIT) 2 MG lozenge Place 1 lozenge (2 mg) inside cheek every hour as needed for other (nicotine withdrawal symptoms) 72 lozenge 0    nicotine (NICODERM CQ) 21 MG/24HR 24 hr patch Place 1 patch onto the skin every 24 hours 28 patch 0    QUEtiapine (SEROQUEL) 50 MG tablet Take  mg by mouth At Bedtime      tadalafil (CIALIS) 10 MG tablet Take 10 mg by mouth   "    terazosin (HYTRIN) 10 MG capsule Take 10 mg by mouth daily        Past Surgical History   History reviewed. No pertinent surgical history.     Family History     Family History   Problem Relation Age of Onset    Family History Negative Mother     Family History Negative Father      Allergies     Allergies   Allergen Reactions    Morphine Unknown     Takes hydrocodone without problem.     Social History     Social History     Tobacco Use    Smoking status: Every Day     Current packs/day: 1.00     Types: Cigarettes    Smokeless tobacco: Not on file   Substance Use Topics    Alcohol use: Yes     Comment: rarely     Physical Exam   Blood pressure (!) 135/109, pulse 93, temperature 97.1  F (36.2  C), resp. rate 18, height 1.753 m (5' 9\"), weight 95.3 kg (210 lb), SpO2 96%.    General: Ill appearing, cooperative with exam, in NAD.  HEENT: Atraumatic. No erythema in posterior pharynx.  Lymph: No cervical or inguinal lymphadenopathy.  Cardiac: RRR. No murmurs.  Lungs: CTAB. Nl WOB.  Abd: Mild right flank tenderness.. No rebound or gaurding. Nl bowel sounds.  Ext: No edema. 2+ pulses.  Skin: No rashes, abrasions, or contusions.  Psych: A&Ox3. Nl affect.  Neuro: 5/5 strength. Sensation intact.    Labs & Imaging     Reviewed and Pertinent results discussed in assessment and plan.      "

## 2025-04-29 NOTE — ED NOTES
"[facility]  ED Nurse Handoff Report    ED Chief complaint: Flank Pain  . ED Diagnosis:   Final diagnoses:   Kidney stone   Intractable pain       Allergies:   Allergies   Allergen Reactions    Morphine Unknown     Takes hydrocodone without problem.       Code Status: Full Code    Activity level - Baseline/Home:  independent.  Activity Level - Current:   independent.   Lift room needed: No.   Bariatric: No   Needed: No   Isolation: No.   Infection: Not Applicable.     Respiratory status: Room air    Vital Signs (within 30 minutes):   Vitals:    04/29/25 0205   BP: (!) 135/109   Pulse: 93   Resp: 18   Temp: 97.1  F (36.2  C)   SpO2: 96%   Weight: 95.3 kg (210 lb)   Height: 1.753 m (5' 9\")       Cardiac Rhythm:  ,      Pain level:    Patient confused: No.   Patient Falls Risk: bed/chair alarm on, nonskid shoes/slippers when out of bed, and patient and family education.   Elimination Status: Has voided     Patient Report - Initial Complaint: flank pain.   Focused Assessment: Acute onset right flank pain rating towards the right lower back that started suddenly 1 hour prior to arrival. Had been at his baseline state of health the last several days including when he went to bed last night. Associated nausea but no vomiting. No fever. No obvious hematuria since this has happened. No abnormal bowel movements recently. Prior to this has not had any urinary frequency urgency or dysuria.     Abnormal Results:   Labs Ordered and Resulted from Time of ED Arrival to Time of ED Departure   BASIC METABOLIC PANEL - Abnormal       Result Value    Sodium 142      Potassium 4.3      Chloride 103      Carbon Dioxide (CO2) 27      Anion Gap 12      Urea Nitrogen 24.6 (*)     Creatinine 1.00      GFR Estimate 87      Calcium 9.4      Glucose 161 (*)    CBC WITH PLATELETS AND DIFFERENTIAL    WBC Count 6.7      RBC Count 4.66      Hemoglobin 14.4      Hematocrit 42.2      MCV 91      MCH 30.9      MCHC 34.1 "      RDW 12.8      Platelet Count 189      % Neutrophils 53      % Lymphocytes 36      % Monocytes 9      % Eosinophils 2      % Basophils 1      % Immature Granulocytes 0      NRBCs per 100 WBC 0      Absolute Neutrophils 3.6      Absolute Lymphocytes 2.4      Absolute Monocytes 0.6      Absolute Eosinophils 0.1      Absolute Basophils 0.0      Absolute Immature Granulocytes 0.0      Absolute NRBCs 0.0     ROUTINE UA WITH MICROSCOPIC REFLEX TO CULTURE        Abd/pelvis CT,  IV  contrast only TRAUMA / AAA   Final Result   IMPRESSION:       1.  3 mm right ureteropelvic junction stone with mild right hydronephrosis.      2.  Focal moderate narrowing of left common iliac artery.      3.  Colonic diverticulosis without acute diverticulitis.          Treatments provided: see MAR  Family Comments: at bedside  OBS brochure/video discussed/provided to patient:  Yes  ED Medications:   Medications   acetaminophen (TYLENOL) tablet 650 mg (has no administration in time range)     Or   acetaminophen (TYLENOL) Suppository 650 mg (has no administration in time range)   melatonin tablet 5 mg (has no administration in time range)   senna-docusate (SENOKOT-S/PERICOLACE) 8.6-50 MG per tablet 1 tablet (has no administration in time range)     Or   senna-docusate (SENOKOT-S/PERICOLACE) 8.6-50 MG per tablet 2 tablet (has no administration in time range)   ondansetron (ZOFRAN ODT) ODT tab 4 mg (has no administration in time range)     Or   ondansetron (ZOFRAN) injection 4 mg (has no administration in time range)   prochlorperazine (COMPAZINE) injection 10 mg (has no administration in time range)     Or   prochlorperazine (COMPAZINE) tablet 10 mg (has no administration in time range)   nicotine (COMMIT) lozenge 2 mg (has no administration in time range)   lactated ringers infusion (has no administration in time range)   HYDROmorphone (DILAUDID) tablet 2 mg (has no administration in time range)   HYDROmorphone (PF) (DILAUDID) injection 0.5  mg (has no administration in time range)   HYDROmorphone (PF) (DILAUDID) injection 0.5 mg (0.5 mg Intravenous $Given 4/29/25 0218)   ondansetron (ZOFRAN) injection 4 mg (4 mg Intravenous $Given 4/29/25 0218)   ketorolac (TORADOL) injection 15 mg (15 mg Intravenous $Given 4/29/25 0218)   iopamidol (ISOVUE-370) solution 500 mL (100 mLs Intravenous $Given 4/29/25 0248)   sodium chloride 0.9 % bag for CT scan flush (65 mLs Intravenous $Given 4/29/25 0248)       Drips infusing:  No  For the majority of the shift this patient was Green.   Interventions performed were none needed.    Sepsis treatment initiated: No    Cares/treatment/interventions/medications to be completed following ED care: continue plan of care.    ED Nurse Name: Joyce Awad RN  3:38 AM

## 2025-04-29 NOTE — ED PROVIDER NOTES
"  Emergency Department Note      History of Present Illness     Chief Complaint   Flank Pain      HPI   Dinesh Farfan is a 59 year old male     Acute onset right flank pain rating towards the right lower back that started suddenly 1 hour prior to arrival.  Had been at his baseline state of health the last several days including when he went to bed last night.  Associated nausea but no vomiting.  No fever.  No obvious hematuria since this has happened.  No abnormal bowel movements recently.  Prior to this has not had any urinary frequency urgency or dysuria.      Independent Historian   Significant other who contributes to the above    Review of External Notes   See ed course     Past Medical History     Medical History and Problem List   Past Medical History:   Diagnosis Date    Herniated lumbar disc without myelopathy        Medications   albuterol (PROAIR HFA/PROVENTIL HFA/VENTOLIN HFA) 108 (90 Base) MCG/ACT inhaler  aspirin (ASA) 325 MG EC tablet  atorvastatin (LIPITOR) 80 MG tablet  chlorhexidine (PERIDEX) 0.12 % solution  HYDROcodone-acetaminophen (NORCO)  MG per tablet  hydrOXYzine (ATARAX) 25 MG tablet  ibuprofen (ADVIL/MOTRIN) 800 MG tablet  ipratropium-albuterol (COMBIVENT RESPIMAT)  MCG/ACT inhaler  NARCAN 4 MG/0.1ML nasal spray  nicotine (COMMIT) 2 MG lozenge  nicotine (NICODERM CQ) 21 MG/24HR 24 hr patch  QUEtiapine (SEROQUEL) 50 MG tablet  tadalafil (CIALIS) 10 MG tablet  terazosin (HYTRIN) 10 MG capsule        Surgical History   No past surgical history on file.    Physical Exam     Patient Vitals for the past 24 hrs:   BP Temp Pulse Resp SpO2 Height Weight   04/29/25 0205 (!) 135/109 97.1  F (36.2  C) 93 18 96 % 1.753 m (5' 9\") 95.3 kg (210 lb)         CV: ppi, regular   Resp: speaking in full sentences without any resp distress  Skin: warm dry well perfused  Neuro: Alert, no gross motor or sensory deficits,  gait stable      Uncomfortable appearing secondary to pain.      Abdomen: " Soft, no significant reproducible tenderness to palpation.  No distention or rigidity.      Diagnostics     Lab Results   Labs Ordered and Resulted from Time of ED Arrival to Time of ED Departure   BASIC METABOLIC PANEL - Abnormal       Result Value    Sodium 142      Potassium 4.3      Chloride 103      Carbon Dioxide (CO2) 27      Anion Gap 12      Urea Nitrogen 24.6 (*)     Creatinine 1.00      GFR Estimate 87      Calcium 9.4      Glucose 161 (*)    CBC WITH PLATELETS AND DIFFERENTIAL    WBC Count 6.7      RBC Count 4.66      Hemoglobin 14.4      Hematocrit 42.2      MCV 91      MCH 30.9      MCHC 34.1      RDW 12.8      Platelet Count 189      % Neutrophils 53      % Lymphocytes 36      % Monocytes 9      % Eosinophils 2      % Basophils 1      % Immature Granulocytes 0      NRBCs per 100 WBC 0      Absolute Neutrophils 3.6      Absolute Lymphocytes 2.4      Absolute Monocytes 0.6      Absolute Eosinophils 0.1      Absolute Basophils 0.0      Absolute Immature Granulocytes 0.0      Absolute NRBCs 0.0     ROUTINE UA WITH MICROSCOPIC REFLEX TO CULTURE       Imaging   Abd/pelvis CT,  IV  contrast only TRAUMA / AAA   Final Result   IMPRESSION:       1.  3 mm right ureteropelvic junction stone with mild right hydronephrosis.      2.  Focal moderate narrowing of left common iliac artery.      3.  Colonic diverticulosis without acute diverticulitis.          EKG       Independent Interpretation       ED Course      Medications Administered   Medications   HYDROmorphone (PF) (DILAUDID) injection 0.5 mg (0.5 mg Intravenous $Given 4/29/25 0218)     And   HYDROmorphone (PF) (DILAUDID) injection 0.5 mg (0.5 mg Intravenous $Given 4/29/25 0310)   ondansetron (ZOFRAN) injection 4 mg (4 mg Intravenous $Given 4/29/25 0218)   ketorolac (TORADOL) injection 15 mg (15 mg Intravenous $Given 4/29/25 0218)   iopamidol (ISOVUE-370) solution 500 mL (100 mLs Intravenous $Given 4/29/25 0248)   sodium chloride 0.9 % bag for CT scan flush  (65 mLs Intravenous $Given 4/29/25 0248)       Procedures   Procedures     Discussion of Management   Admitting Hospitalist,      ED Course   ED Course as of 04/29/25 0316   Tue Apr 29, 2025   0208 I reviewed last family medicine note April 22, 2025.   0208 I reviewed last neurology note March 26, 2025 in Care Everywhere.  They summarized the history of left MCA infarction, intracranial atherosclerosis and occlusion of the left carotid artery.   0209 I reviewed last oncology note February 14, 2025 in Care Everywhere.  History of cecal cancer.  No evidence of recurrence.       Additional Documentation      Medical Decision Making / Diagnosis     CMS Diagnoses:         [facility]   Dinesh Farfan is a 59 year old male     Acute onset right flank pain found to have a proximal obstructing right ureteral stone.  Low clinical suspicion for concomitant infection, will obtain urinalysis when able to rule this out.  Basic blood test unremarkable.  Struggling with symptom control recommending observation for continued urology consultation, analgesic and antiemetic management     Disposition   The patient was admitted to the hospital.     Diagnosis     ICD-10-CM    1. Kidney stone  N20.0       2. Intractable pain  R52            Discharge Medications   New Prescriptions    No medications on file         MD Javed Garcia, Osmra Rubio MD  04/29/25 0319

## 2025-04-29 NOTE — PLAN OF CARE
PRIMARY DIAGNOSIS: Obstructive ureteral stone     OUTPATIENT/OBSERVATION GOALS TO BE MET BEFORE DISCHARGE  1. Pain Status: Improved-controlled with oral pain medications.    2. Tolerating adequate PO diet: Yes    3. Surgical Intervention planned: Possibly tomorrow 4/30     4. Cleared by consultants (if involved): No    5. Return to near baseline physical activity: Yes    Discharge Planner Nurse   Safe discharge environment identified: Yes  Barriers to discharge: No       Entered by: Sommer Mcgowan RN 04/29/2025      Please review provider order for any additional goals.   Nurse to notify provider when observation goals have been met and patient is ready for discharge.    Goal Outcome Evaluation:      Plan of Care Reviewed With: patient    Overall Patient Progress: improvingOverall Patient Progress: improving    Outcome Evaluation: A&Ox4. Up independently in room. R flank/back pain 6/10, managed w/ po Tylenol and Dilaudid w/ relief. Denies nausea. BS active x4, tolerating diet, passing flatus. Urology consulted. Plan to be NPO at midnight for possible cystoscopy tomorrow (4/30). Continue to strain urine.       Satisfactory

## 2025-04-29 NOTE — PLAN OF CARE
PRIMARY DIAGNOSIS: Obstructive ureteral stone     OUTPATIENT/OBSERVATION GOALS TO BE MET BEFORE DISCHARGE  1. Pain Status: Improved-controlled with oral pain medications.    2. Tolerating adequate PO diet:  NPO    3. Surgical Intervention planned: Pending Urology consult     4. Cleared by consultants (if involved): No    5. Return to near baseline physical activity: Yes    Discharge Planner Nurse   Safe discharge environment identified: Yes  Barriers to discharge: No       Entered by: Sommer Mcgowan RN 04/29/2025      Please review provider order for any additional goals.   Nurse to notify provider when observation goals have been met and patient is ready for discharge.      Goal Outcome Evaluation:      Plan of Care Reviewed With: patient    Overall Patient Progress: improvingOverall Patient Progress: improving    Outcome Evaluation: A&Ox4. Up independently in room. Rates R flank/back pain 8/10 this AM, given po Tylenol and Dilaudid w/ relief. Denies nausea. Straining urine. IVF infusing. Urology consulted.

## 2025-04-29 NOTE — PHARMACY-ADMISSION MEDICATION HISTORY
Pharmacist Admission Medication History    Admission medication history is complete. The information provided in this note is only as accurate as the sources available at the time of the update.    Information Source(s): Patient via in-person    Pertinent Information: -    Changes made to PTA medication list:  Added: duloxetine, budesonide-formoterol inhaler  Deleted: ibuprofen, Combivent inhaler, nicotine lozenge and patch  Changed: Norco (added dose and frequency)    Allergies reviewed with patient and updates made in EHR: yes    Medication History Completed By: Erika Mckenzie RPH 4/29/2025 11:13 AM    PTA Med List   Medication Sig Note Last Dose/Taking    albuterol (PROAIR HFA/PROVENTIL HFA/VENTOLIN HFA) 108 (90 Base) MCG/ACT inhaler Inhale 2 puffs into the lungs every 6 hours as needed for shortness of breath, wheezing or cough  Taking As Needed    aspirin (ASA) 325 MG EC tablet Take 1 tablet (325 mg) by mouth daily.  4/27/2025    atorvastatin (LIPITOR) 80 MG tablet Take 1 tablet (80 mg) by mouth every evening.  4/27/2025    budesonide-formoterol (SYMBICORT/BREYNA) 160-4.5 MCG/ACT Inhaler Inhale 2 puffs into the lungs 2 times daily. 4/29/2025: New Rx, patient has not started yet. Taking    DULoxetine (CYMBALTA) 60 MG capsule Take 60 mg by mouth daily.  4/27/2025    HYDROcodone-acetaminophen (NORCO)  MG per tablet Take 1 tablet by mouth 4 times daily as needed.  4/28/2025    hydrOXYzine (ATARAX) 25 MG tablet Take 25 mg by mouth 3 times daily as needed for anxiety  Taking As Needed    NARCAN 4 MG/0.1ML nasal spray USE AS NEEDED FOR OPIOID OVERDOSE PER INSTRUCTION  Taking    QUEtiapine (SEROQUEL) 50 MG tablet Take  mg by mouth At Bedtime  4/27/2025    tadalafil (CIALIS) 10 MG tablet Take 10 mg by mouth as needed.  Taking As Needed    terazosin (HYTRIN) 10 MG capsule Take 10 mg by mouth daily  4/27/2025

## 2025-04-29 NOTE — CONSULTS
[facility] Consultation by [facility]    Dinesh Farfan MRN# [id number]   Age: 59 year old YOB: 1965     Date of Admission:  4/29/2025    Reason for consult: Obstructive ureteral stone       Requesting PA/MD: Dr. Epps        Level of consult: Consult, follow and place orders             Impression and Plan:   Impression/Assessment:   Dinesh Farfan is a 59 year old male with 3-4 mm proximal right UPJ ureteral stone  Poor pain control      Plan:   -Okay for diet.  No plan for the OR today.  -I discussed with the patient that he can go home with medical expulsive therapy with pain medication and see if he could pass the stone on his own.  It is emphasized that he may be able to pass on his own, but it is still rather high within the ureter.  -The other option would be to continue with pain management here today, n.p.o. at midnight, and consider possible surgical intervention tomorrow if pain control continues to be problematic.  -If patient were to go to the OR, we discussed possible cystoscopy with right-sided ureteroscopy laser lithotripsy and basketing.  Discussed that because the stone is high and small, there is a possibility that we will be unable to obtain the stone on the first try.  If this happens, ureteral stent will be placed.  We will then return to the OR in several weeks for definitive stone management.  -Possible side effects with an indwelling ureteral stent such as urgency and frequency of urination, dysuria, hematuria, symptoms of urine reflux, and some achiness in the side. Indwelling ureteral stents need to be exchanged every three months or removed by three months.    -IVF fluids  -Patient is already on Hytrin 10 mg daily.  BP slightly hesitant to add on Flomax 0.4 mg at this time due to possible orthostatic hypotension.  -Strain urine.  -Pain and nausea medication per primary service.  -Thank you for involving us in the care of this patient.   Patient would like to discuss with his wife about whether he would like to go home with a trial of medical expulsive therapy or continue with pain management and possible surgical intervention tomorrow if pain is not well-controlled.    ADDENDUM  Discussed with hospital team. Patient feels more comfortable continuing with pain control here and reassess tomorrow.  NPO at midnight.  Consideration of possible procedure tomorrow.    Mirlande Samuels PA-C  [facility]   [phone]               Chief Complaint:   Flank pain     History is obtained from the patient and EMR.         History of Present Illness:   This patient is a 59 year old male who presented to the ER overnight with right lower back pain that started approximately hour prior to arrival.  He had not experienced anything similar to this.  He had noted nausea, but no vomiting.  Denies any fevers, chills, hematuria, or dysuria.    Patient underwent CT imaging which shows an approximately 3 to 4 mm stone just outside of the right kidney near the UPJ.  Patient was having difficulty having pain control, so he was admitted for pain management.    He denies any previous history of nephrolithiasis.  Family history of nephrolithiasis in his brother.  He is currently on Hytrin 10 mg daily.  WBC 9.6 (6.7) creatinine 0.97 EGFR 90 (1.00 EGFR 87).  Urinalysis was not concerning for infection.              Past Medical History:     Past Medical History:   Diagnosis Date    Herniated lumbar disc without myelopathy           Past Surgical History:   History reviewed. No pertinent surgical history.          Social History:     Social History     Tobacco Use    Smoking status: Every Day     Current packs/day: 1.00     Types: Cigarettes    Smokeless tobacco: Not on file   Substance Use Topics    Alcohol use: Yes     Comment: rarely   .          Family History:     Family History   Problem Relation Age of Onset    Family History Negative Mother     Family  History Negative Father    Family history of nephrolithiasis in his brother.         Allergies:     Allergies   Allergen Reactions    Morphine Unknown     Takes hydrocodone without problem.          Medications:     Current Facility-Administered Medications   Medication Dose Route Frequency Provider Last Rate Last Admin    acetaminophen (TYLENOL) tablet 650 mg  650 mg Oral Q4H PRN Jackson Epps MD   650 mg at 04/29/25 0833    Or    acetaminophen (TYLENOL) Suppository 650 mg  650 mg Rectal Q4H PRN Jackson Epps MD        aspirin (ASA) EC tablet 325 mg  325 mg Oral Daily Jackson Epps MD        atorvastatin (LIPITOR) tablet 80 mg  80 mg Oral QPM Jackson Epps MD        HYDROmorphone (DILAUDID) injection 1 mg  1 mg Intravenous Q3H PRN Jackson Epps MD        HYDROmorphone (DILAUDID) tablet 4 mg  4 mg Oral Q3H PRN Jackson Epps MD   4 mg at 04/29/25 0833    hydrOXYzine HCl (ATARAX) tablet 25 mg  25 mg Oral Q6H PRN Nils Villalta MD   25 mg at 04/29/25 0601    melatonin tablet 5 mg  5 mg Oral At Bedtime PRN Jackson Epps MD        naloxone (NARCAN) injection 0.2 mg  0.2 mg Intravenous Q2 Min PRN Jackson Epps MD        Or    naloxone (NARCAN) injection 0.4 mg  0.4 mg Intravenous Q2 Min PRN Jackson Epps MD        Or    naloxone (NARCAN) injection 0.2 mg  0.2 mg Intramuscular Q2 Min PRN Jackson Epps MD        Or    naloxone (NARCAN) injection 0.4 mg  0.4 mg Intramuscular Q2 Min PRN Jackson Epps MD        nicotine (COMMIT) lozenge 2 mg  2 mg Buccal Q1H PRN Jackson Epps MD        ondansetron (ZOFRAN ODT) ODT tab 4 mg  4 mg Oral Q6H PRN Jackson Epps MD        Or    ondansetron (ZOFRAN) injection 4 mg  4 mg Intravenous Q6H PRN Jackson Epps MD        prochlorperazine (COMPAZINE) injection 10 mg  10 mg  "Intravenous Q6H PRN Jackson Epps MD        Or    prochlorperazine (COMPAZINE) tablet 10 mg  10 mg Oral Q6H PRN Jackson Epps MD        senrachele-sachiusate (SENOKOT-S/PERICOLACE) 8.6-50 MG per tablet 1 tablet  1 tablet Oral BID PRN Jackson Epps MD        Or    senna-docusate (SENOKOT-S/PERICOLACE) 8.6-50 MG per tablet 2 tablet  2 tablet Oral BID PRN Jackson Epps MD        terazosin (HYTRIN) capsule 10 mg  10 mg Oral At Bedtime Jackson Epps MD   10 mg at 04/29/25 0454             Review of Systems:   A comprehensive 10-point review of systems was performed and found to be negative except as described in the HPI.     /82 (BP Location: Left arm)   Pulse 79   Temp 97.9  F (36.6  C) (Oral)   Resp 18   Ht 1.753 m (5' 9\")   Wt 97.8 kg (215 lb 11.2 oz)   SpO2 93%   BMI 31.85 kg/m    PSYCH: NAD  EYES: EOMI  MOUTH: MMM  NECK: Supple, no notable adenopathy  RESP: Unlabored breathing  CARDIAC: No LE edema, regular radial pulse  SKIN: Warm, no rashes  ABD: soft, tenderness with palpation right upper quadrant.  NEURO: AAO x3  URO: Urinating on own          Data:     Lab Results   Component Value Date    WBC 9.1 04/29/2025    HGB 13.6 04/29/2025    HCT 39.8 (L) 04/29/2025    MCV 91 04/29/2025     04/29/2025     Lab Results   Component Value Date    CR 0.97 04/29/2025    CR 1.00 04/29/2025     Recent Labs   Lab 04/29/25  0338   COLOR Orange*   APPEARANCE Clear   URINEGLC Negative   URINEBILI Negative   URINEKETONE Negative   SG 1.020   URINEPH 6.0   PROTEIN 70*   NITRITE Negative   LEUKEST Negative   RBCU >182*   WBCU 0        IMAGING    Abd/pelvis CT,  IV  contrast only TRAUMA / AAA    Result Date: 4/29/2025  EXAM: CT ABDOMEN PELVIS W CONTRAST LOCATION: [facility] DATE: 4/29/2025 INDICATION: Acute onset of right flank pain. COMPARISON: None. TECHNIQUE: CT scan of the abdomen and pelvis was performed following " injection of IV contrast. Multiplanar reformats were obtained. Dose reduction techniques were used. CONTRAST: 100 mL Isovue 370 FINDINGS: LOWER CHEST: Normal. HEPATOBILIARY: Normal liver and gallbladder. PANCREAS: Normal. SPLEEN: Normal. ADRENAL GLANDS: Normal. KIDNEYS/BLADDER: A 3 mm stone is seen at the right ureteropelvic junction with mild right hydronephrosis. Bilateral kidneys and bladder are otherwise normal. A 1.5 cm simple appearing cyst in the left kidney does not require follow-up. BOWEL: Postsurgical changes of right partial colectomy with ileocolic anastomosis in the right upper quadrant. No abnormal bowel thickening or bowel obstruction. Multiple diverticula seen in the left colon without acute diverticulitis. No free fluid or free air. LYMPH NODES: Normal. VASCULATURE: Moderate aortoiliofemoral atherosclerotic calcifications with mild thrombus formation in the abdominal aorta. Focal moderate stenosis in the left common iliac artery. PELVIC ORGANS: Normal. MUSCULOSKELETAL: Multilevel mild and moderate degenerative disc space narrowing in the thoracolumbar spine most advanced at L3/4. Mild degenerative anterolisthesis of L5 relative to L4 and S1. Bilateral L5 pars interarticularis defects noted. Mild anterior osteophyte formations in the spine. No suspicious osseous lesions or acute fractures.     IMPRESSION: 1.  3 mm right ureteropelvic junction stone with mild right hydronephrosis. 2.  Focal moderate narrowing of left common iliac artery. 3.  Colonic diverticulosis without acute diverticulitis.      Discussed with Dr. Paulino and CARLA Alberto PA-C   [facility]  [phone]

## 2025-04-29 NOTE — ED TRIAGE NOTES
Pt presents to triage with c/o R flank pain. Onset approximately 1 hour prior to arrival. Denies nausea and vomiting.

## 2025-04-29 NOTE — PLAN OF CARE
PRIMARY DIAGNOSIS:  Obstructive ureteral stone    OUTPATIENT/OBSERVATION GOALS TO BE MET BEFORE DISCHARGE:  ADLs back to baseline: Yes    Activity and level of assistance: Ambulating independently.    Pain status: Pain free.    Return to near baseline physical activity: Yes     Discharge Planner Nurse   Safe discharge environment identified: Yes  Barriers to discharge: No       Entered by: Sommer Mcgowan RN 04/29/2025      Please review provider order for any additional goals.   Nurse to notify provider when observation goals have been met and patient is ready for discharge.    Goal Outcome Evaluation:      Plan of Care Reviewed With: patient    Overall Patient Progress: improvingOverall Patient Progress: improving    Outcome Evaluation: A&Ox4. Up independently in room. Denies pain this evening. Resting between cares. Denies nausea. BS active x4, tolerating diet, passing flatus. Urology consulted. Plan to be NPO at midnight for possible cystoscopy tomorrow (4/30). Continue to strain urine.

## 2025-04-29 NOTE — PLAN OF CARE
ROOM # [id number]    Living Situation W spouse  Facility name:  : [facility]    Activity level at baseline: ind  Activity level on admit: ind    Who will be transporting you at discharge: TBD    Patient registered to observation; given Patient Bill of Rights; given the opportunity to ask questions about observation status and their plan of care.  Patient has been oriented to the observation room, bathroom and call light is in place.    Discussed discharge goals and expectations with patient/family.

## 2025-04-29 NOTE — PLAN OF CARE
PRIMARY DIAGNOSIS: Kidney Stone/ R flank pain  OUTPATIENT/OBSERVATION GOALS TO BE MET BEFORE DISCHARGE:  1. Pain Status: Improved-controlled with oral pain medications.    2. Return to near baseline physical activity: Yes    3. Cleared for discharge by consultants (if involved): No    Discharge Planner Nurse   Safe discharge environment identified: Yes  Barriers to discharge: Yes       Vitals are Temp: 97.8  F (36.6  C) Temp src: Oral BP: 135/65 Pulse: 68   Resp: 16 SpO2: 93 %.  Patient is Alert and Oriented x4. They are independent with no assistive devices. CT- 3mm R ureteropelvic junction stone w mild R hydronephrosis.   Pt is a NPO diet. Urology to consult.  They are complaining of 8/10 pain R flank pain into RUQ and RLQ.  Dilaudid given for pain. Atarax for Anxiety.  Patient has Lactated Ringer's running at 100 mL per hour.     Please review provider order for any additional goals.   Nurse to notify provider when observation goals have been met and patient is ready for discharge.Goal Outcome Evaluation:      Plan of Care Reviewed With: patient    Overall Patient Progress: improvingOverall Patient Progress: improving    Outcome Evaluation: A&Ox4, Ind, CT- 3 mm Ureteral stone. LR 100mL/hr, Pain managment- PRN dilaudid. Urology to consult. NPO. Atarax for anxiety

## 2025-04-30 ENCOUNTER — APPOINTMENT (OUTPATIENT)
Dept: GENERAL RADIOLOGY | Facility: CLINIC | Age: 60
End: 2025-04-30
Attending: UROLOGY
Payer: MEDICARE

## 2025-04-30 ENCOUNTER — HOSPITAL ENCOUNTER (EMERGENCY)
Facility: CLINIC | Age: 60
Discharge: HOME | End: 2025-05-01
Attending: EMERGENCY MEDICINE
Payer: MEDICARE

## 2025-04-30 ENCOUNTER — ANESTHESIA (OUTPATIENT)
Dept: SURGERY | Facility: CLINIC | Age: 60
End: 2025-04-30
Payer: MEDICARE

## 2025-04-30 ENCOUNTER — APPOINTMENT (OUTPATIENT)
Dept: ULTRASOUND IMAGING | Facility: CLINIC | Age: 60
End: 2025-04-30
Attending: EMERGENCY MEDICINE
Payer: MEDICARE

## 2025-04-30 ENCOUNTER — ANESTHESIA EVENT (OUTPATIENT)
Dept: SURGERY | Facility: CLINIC | Age: 60
End: 2025-04-30
Payer: MEDICARE

## 2025-04-30 VITALS
TEMPERATURE: 97.1 F | SYSTOLIC BLOOD PRESSURE: 179 MMHG | HEART RATE: 77 BPM | DIASTOLIC BLOOD PRESSURE: 77 MMHG | WEIGHT: 215.7 LBS | OXYGEN SATURATION: 95 % | BODY MASS INDEX: 31.95 KG/M2 | HEIGHT: 69 IN | RESPIRATION RATE: 20 BRPM

## 2025-04-30 DIAGNOSIS — Z96.0 URETERAL STENT PRESENT: ICD-10-CM

## 2025-04-30 DIAGNOSIS — N20.1 RIGHT URETERAL STONE: ICD-10-CM

## 2025-04-30 DIAGNOSIS — R10.84 GENERALIZED ABDOMINAL PAIN: ICD-10-CM

## 2025-04-30 DIAGNOSIS — N20.1 URETERAL STONE: Primary | ICD-10-CM

## 2025-04-30 LAB
ALBUMIN SERPL BCG-MCNC: 4.5 G/DL (ref 3.5–5.2)
ALP SERPL-CCNC: 88 U/L (ref 40–150)
ALT SERPL W P-5'-P-CCNC: 19 U/L (ref 0–70)
ANION GAP SERPL CALCULATED.3IONS-SCNC: 15 MMOL/L (ref 7–15)
ANION GAP SERPL CALCULATED.3IONS-SCNC: 9 MMOL/L (ref 7–15)
AST SERPL W P-5'-P-CCNC: 22 U/L (ref 0–45)
BASOPHILS # BLD AUTO: 0 10E3/UL (ref 0–0.2)
BASOPHILS NFR BLD AUTO: 0 %
BILIRUB SERPL-MCNC: 0.9 MG/DL
BUN SERPL-MCNC: 24.7 MG/DL (ref 8–23)
BUN SERPL-MCNC: 25.2 MG/DL (ref 8–23)
CALCIUM SERPL-MCNC: 8.9 MG/DL (ref 8.8–10.4)
CALCIUM SERPL-MCNC: 9.6 MG/DL (ref 8.8–10.4)
CHLORIDE SERPL-SCNC: 102 MMOL/L (ref 98–107)
CHLORIDE SERPL-SCNC: 103 MMOL/L (ref 98–107)
CREAT SERPL-MCNC: 0.8 MG/DL (ref 0.67–1.17)
CREAT SERPL-MCNC: 0.94 MG/DL (ref 0.67–1.17)
EGFRCR SERPLBLD CKD-EPI 2021: >90 ML/MIN/1.73M2
EGFRCR SERPLBLD CKD-EPI 2021: >90 ML/MIN/1.73M2
EOSINOPHIL # BLD AUTO: 0 10E3/UL (ref 0–0.7)
EOSINOPHIL NFR BLD AUTO: 0 %
ERYTHROCYTE [DISTWIDTH] IN BLOOD BY AUTOMATED COUNT: 12.6 % (ref 10–15)
ERYTHROCYTE [DISTWIDTH] IN BLOOD BY AUTOMATED COUNT: 12.9 % (ref 10–15)
GLUCOSE BLDC GLUCOMTR-MCNC: 100 MG/DL (ref 70–99)
GLUCOSE SERPL-MCNC: 168 MG/DL (ref 70–99)
GLUCOSE SERPL-MCNC: 97 MG/DL (ref 70–99)
HCO3 SERPL-SCNC: 22 MMOL/L (ref 22–29)
HCO3 SERPL-SCNC: 27 MMOL/L (ref 22–29)
HCT VFR BLD AUTO: 39.4 % (ref 40–53)
HCT VFR BLD AUTO: 40.8 % (ref 40–53)
HGB BLD-MCNC: 13.5 G/DL (ref 13.3–17.7)
HGB BLD-MCNC: 14.3 G/DL (ref 13.3–17.7)
IMM GRANULOCYTES # BLD: 0 10E3/UL
IMM GRANULOCYTES NFR BLD: 0 %
LIPASE SERPL-CCNC: 15 U/L (ref 13–60)
LYMPHOCYTES # BLD AUTO: 0.6 10E3/UL (ref 0.8–5.3)
LYMPHOCYTES NFR BLD AUTO: 7 %
MCH RBC QN AUTO: 31 PG (ref 26.5–33)
MCH RBC QN AUTO: 31.1 PG (ref 26.5–33)
MCHC RBC AUTO-ENTMCNC: 34.3 G/DL (ref 31.5–36.5)
MCHC RBC AUTO-ENTMCNC: 35 G/DL (ref 31.5–36.5)
MCV RBC AUTO: 88 FL (ref 78–100)
MCV RBC AUTO: 91 FL (ref 78–100)
MONOCYTES # BLD AUTO: 0.1 10E3/UL (ref 0–1.3)
MONOCYTES NFR BLD AUTO: 1 %
NEUTROPHILS # BLD AUTO: 8.1 10E3/UL (ref 1.6–8.3)
NEUTROPHILS NFR BLD AUTO: 92 %
NRBC # BLD AUTO: 0 10E3/UL
NRBC BLD AUTO-RTO: 0 /100
PLATELET # BLD AUTO: 172 10E3/UL (ref 150–450)
PLATELET # BLD AUTO: 180 10E3/UL (ref 150–450)
POTASSIUM SERPL-SCNC: 4.1 MMOL/L (ref 3.4–5.3)
POTASSIUM SERPL-SCNC: 4.1 MMOL/L (ref 3.4–5.3)
PROT SERPL-MCNC: 7.4 G/DL (ref 6.4–8.3)
RBC # BLD AUTO: 4.34 10E6/UL (ref 4.4–5.9)
RBC # BLD AUTO: 4.62 10E6/UL (ref 4.4–5.9)
SODIUM SERPL-SCNC: 139 MMOL/L (ref 135–145)
SODIUM SERPL-SCNC: 139 MMOL/L (ref 135–145)
WBC # BLD AUTO: 6.2 10E3/UL (ref 4–11)
WBC # BLD AUTO: 8.9 10E3/UL (ref 4–11)

## 2025-04-30 PROCEDURE — 250N000013 HC RX MED GY IP 250 OP 250 PS 637: Performed by: INTERNAL MEDICINE

## 2025-04-30 PROCEDURE — 52332 CYSTOSCOPY AND TREATMENT: CPT | Mod: RT | Performed by: UROLOGY

## 2025-04-30 PROCEDURE — 99212 OFFICE O/P EST SF 10 MIN: CPT | Performed by: PHYSICIAN ASSISTANT

## 2025-04-30 PROCEDURE — 83690 ASSAY OF LIPASE: CPT | Performed by: EMERGENCY MEDICINE

## 2025-04-30 PROCEDURE — 272N000001 HC OR GENERAL SUPPLY STERILE: Performed by: UROLOGY

## 2025-04-30 PROCEDURE — C1894 INTRO/SHEATH, NON-LASER: HCPCS | Performed by: UROLOGY

## 2025-04-30 PROCEDURE — 76705 ECHO EXAM OF ABDOMEN: CPT

## 2025-04-30 PROCEDURE — 250N000011 HC RX IP 250 OP 636: Mod: JZ | Performed by: EMERGENCY MEDICINE

## 2025-04-30 PROCEDURE — 99285 EMERGENCY DEPT VISIT HI MDM: CPT | Mod: 25

## 2025-04-30 PROCEDURE — 710N000009 HC RECOVERY PHASE 1, LEVEL 1, PER MIN: Performed by: UROLOGY

## 2025-04-30 PROCEDURE — 96376 TX/PRO/DX INJ SAME DRUG ADON: CPT

## 2025-04-30 PROCEDURE — 250N000011 HC RX IP 250 OP 636: Performed by: NURSE ANESTHETIST, CERTIFIED REGISTERED

## 2025-04-30 PROCEDURE — G0378 HOSPITAL OBSERVATION PER HR: HCPCS

## 2025-04-30 PROCEDURE — 82310 ASSAY OF CALCIUM: CPT | Performed by: EMERGENCY MEDICINE

## 2025-04-30 PROCEDURE — 85027 COMPLETE CBC AUTOMATED: CPT | Performed by: EMERGENCY MEDICINE

## 2025-04-30 PROCEDURE — 360N000082 HC SURGERY LEVEL 2 W/ FLUORO, PER MIN: Performed by: UROLOGY

## 2025-04-30 PROCEDURE — 82565 ASSAY OF CREATININE: CPT | Performed by: PHYSICIAN ASSISTANT

## 2025-04-30 PROCEDURE — C2617 STENT, NON-COR, TEM W/O DEL: HCPCS | Performed by: UROLOGY

## 2025-04-30 PROCEDURE — 96374 THER/PROPH/DIAG INJ IV PUSH: CPT | Mod: 59

## 2025-04-30 PROCEDURE — 258N000001 HC RX 258: Performed by: UROLOGY

## 2025-04-30 PROCEDURE — 85025 COMPLETE CBC W/AUTO DIFF WBC: CPT | Performed by: PHYSICIAN ASSISTANT

## 2025-04-30 PROCEDURE — 999N000179 XR SURGERY CARM FLUORO LESS THAN 5 MIN W STILLS

## 2025-04-30 PROCEDURE — 250N000009 HC RX 250: Performed by: NURSE ANESTHETIST, CERTIFIED REGISTERED

## 2025-04-30 PROCEDURE — 99238 HOSP IP/OBS DSCHRG MGMT 30/<: CPT | Performed by: PHYSICIAN ASSISTANT

## 2025-04-30 PROCEDURE — 258N000003 HC RX IP 258 OP 636: Performed by: NURSE ANESTHETIST, CERTIFIED REGISTERED

## 2025-04-30 PROCEDURE — 36415 COLL VENOUS BLD VENIPUNCTURE: CPT | Performed by: PHYSICIAN ASSISTANT

## 2025-04-30 PROCEDURE — 52351 CYSTOURETERO & OR PYELOSCOPE: CPT | Mod: RT | Performed by: UROLOGY

## 2025-04-30 PROCEDURE — 96375 TX/PRO/DX INJ NEW DRUG ADDON: CPT

## 2025-04-30 PROCEDURE — 370N000017 HC ANESTHESIA TECHNICAL FEE, PER MIN: Performed by: UROLOGY

## 2025-04-30 PROCEDURE — 255N000002 HC RX 255 OP 636: Performed by: UROLOGY

## 2025-04-30 PROCEDURE — 74420 UROGRAPHY RTRGR +-KUB: CPT | Mod: 26 | Performed by: UROLOGY

## 2025-04-30 PROCEDURE — 999N000141 HC STATISTIC PRE-PROCEDURE NURSING ASSESSMENT: Performed by: UROLOGY

## 2025-04-30 PROCEDURE — C1769 GUIDE WIRE: HCPCS | Performed by: UROLOGY

## 2025-04-30 PROCEDURE — 82962 GLUCOSE BLOOD TEST: CPT

## 2025-04-30 PROCEDURE — 710N000012 HC RECOVERY PHASE 2, PER MINUTE: Performed by: UROLOGY

## 2025-04-30 PROCEDURE — 250N000025 HC SEVOFLURANE, PER MIN: Performed by: UROLOGY

## 2025-04-30 PROCEDURE — C1758 CATHETER, URETERAL: HCPCS | Performed by: UROLOGY

## 2025-04-30 PROCEDURE — 250N000011 HC RX IP 250 OP 636: Mod: JZ | Performed by: ANESTHESIOLOGY

## 2025-04-30 PROCEDURE — 36415 COLL VENOUS BLD VENIPUNCTURE: CPT | Performed by: EMERGENCY MEDICINE

## 2025-04-30 PROCEDURE — 250N000009 HC RX 250: Performed by: UROLOGY

## 2025-04-30 PROCEDURE — 250N000013 HC RX MED GY IP 250 OP 250 PS 637: Performed by: ANESTHESIOLOGY

## 2025-04-30 DEVICE — URETERAL STENT
Type: IMPLANTABLE DEVICE | Site: URETER | Status: FUNCTIONAL
Brand: POLARIS™ ULTRA

## 2025-04-30 RX ORDER — ONDANSETRON 4 MG/1
4 TABLET, ORALLY DISINTEGRATING ORAL EVERY 30 MIN PRN
Status: DISCONTINUED | OUTPATIENT
Start: 2025-04-30 | End: 2025-04-30 | Stop reason: HOSPADM

## 2025-04-30 RX ORDER — LABETALOL 20 MG/4 ML (5 MG/ML) INTRAVENOUS SYRINGE
PRN
Status: DISCONTINUED | OUTPATIENT
Start: 2025-04-30 | End: 2025-04-30

## 2025-04-30 RX ORDER — KETOROLAC TROMETHAMINE 15 MG/ML
15 INJECTION, SOLUTION INTRAMUSCULAR; INTRAVENOUS ONCE
Status: COMPLETED | OUTPATIENT
Start: 2025-04-30 | End: 2025-04-30

## 2025-04-30 RX ORDER — FENTANYL CITRATE 50 UG/ML
INJECTION, SOLUTION INTRAMUSCULAR; INTRAVENOUS PRN
Status: DISCONTINUED | OUTPATIENT
Start: 2025-04-30 | End: 2025-04-30

## 2025-04-30 RX ORDER — DEXAMETHASONE SODIUM PHOSPHATE 4 MG/ML
4 INJECTION, SOLUTION INTRA-ARTICULAR; INTRALESIONAL; INTRAMUSCULAR; INTRAVENOUS; SOFT TISSUE
Status: DISCONTINUED | OUTPATIENT
Start: 2025-04-30 | End: 2025-04-30 | Stop reason: HOSPADM

## 2025-04-30 RX ORDER — FENTANYL CITRATE 50 UG/ML
50 INJECTION, SOLUTION INTRAMUSCULAR; INTRAVENOUS EVERY 5 MIN PRN
Status: COMPLETED | OUTPATIENT
Start: 2025-04-30 | End: 2025-04-30

## 2025-04-30 RX ORDER — PROPOFOL 10 MG/ML
INJECTION, EMULSION INTRAVENOUS PRN
Status: DISCONTINUED | OUTPATIENT
Start: 2025-04-30 | End: 2025-04-30

## 2025-04-30 RX ORDER — ONDANSETRON 2 MG/ML
4 INJECTION INTRAMUSCULAR; INTRAVENOUS EVERY 30 MIN PRN
Status: DISCONTINUED | OUTPATIENT
Start: 2025-04-30 | End: 2025-04-30 | Stop reason: HOSPADM

## 2025-04-30 RX ORDER — LABETALOL HYDROCHLORIDE 5 MG/ML
10 INJECTION, SOLUTION INTRAVENOUS
Status: DISCONTINUED | OUTPATIENT
Start: 2025-04-30 | End: 2025-04-30 | Stop reason: HOSPADM

## 2025-04-30 RX ORDER — OXYCODONE HYDROCHLORIDE 5 MG/1
10 TABLET ORAL
Status: DISCONTINUED | OUTPATIENT
Start: 2025-04-30 | End: 2025-04-30 | Stop reason: HOSPADM

## 2025-04-30 RX ORDER — LIDOCAINE 40 MG/G
CREAM TOPICAL
Status: DISCONTINUED | OUTPATIENT
Start: 2025-04-30 | End: 2025-04-30 | Stop reason: HOSPADM

## 2025-04-30 RX ORDER — FENTANYL CITRATE 50 UG/ML
50 INJECTION, SOLUTION INTRAMUSCULAR; INTRAVENOUS EVERY 5 MIN PRN
Status: DISCONTINUED | OUTPATIENT
Start: 2025-04-30 | End: 2025-04-30 | Stop reason: HOSPADM

## 2025-04-30 RX ORDER — SODIUM CHLORIDE, SODIUM LACTATE, POTASSIUM CHLORIDE, CALCIUM CHLORIDE 600; 310; 30; 20 MG/100ML; MG/100ML; MG/100ML; MG/100ML
INJECTION, SOLUTION INTRAVENOUS CONTINUOUS
Status: DISCONTINUED | OUTPATIENT
Start: 2025-04-30 | End: 2025-04-30 | Stop reason: HOSPADM

## 2025-04-30 RX ORDER — HYDRALAZINE HYDROCHLORIDE 20 MG/ML
2.5-5 INJECTION INTRAMUSCULAR; INTRAVENOUS EVERY 10 MIN PRN
Status: DISCONTINUED | OUTPATIENT
Start: 2025-04-30 | End: 2025-04-30 | Stop reason: HOSPADM

## 2025-04-30 RX ORDER — TAMSULOSIN HYDROCHLORIDE 0.4 MG/1
0.4 CAPSULE ORAL DAILY
Qty: 30 CAPSULE | Refills: 11 | Status: SHIPPED | OUTPATIENT
Start: 2025-04-30 | End: 2026-04-30

## 2025-04-30 RX ORDER — KETAMINE HYDROCHLORIDE 10 MG/ML
5 INJECTION, SOLUTION INTRAMUSCULAR; INTRAVENOUS
Status: DISCONTINUED | OUTPATIENT
Start: 2025-04-30 | End: 2025-05-01 | Stop reason: HOSPADM

## 2025-04-30 RX ORDER — GLYCOPYRROLATE 0.2 MG/ML
INJECTION, SOLUTION INTRAMUSCULAR; INTRAVENOUS PRN
Status: DISCONTINUED | OUTPATIENT
Start: 2025-04-30 | End: 2025-04-30

## 2025-04-30 RX ORDER — DEXAMETHASONE SODIUM PHOSPHATE 4 MG/ML
INJECTION, SOLUTION INTRA-ARTICULAR; INTRALESIONAL; INTRAMUSCULAR; INTRAVENOUS; SOFT TISSUE PRN
Status: DISCONTINUED | OUTPATIENT
Start: 2025-04-30 | End: 2025-04-30

## 2025-04-30 RX ORDER — SODIUM CHLORIDE, SODIUM LACTATE, POTASSIUM CHLORIDE, CALCIUM CHLORIDE 600; 310; 30; 20 MG/100ML; MG/100ML; MG/100ML; MG/100ML
INJECTION, SOLUTION INTRAVENOUS CONTINUOUS PRN
Status: DISCONTINUED | OUTPATIENT
Start: 2025-04-30 | End: 2025-04-30

## 2025-04-30 RX ORDER — LIDOCAINE HYDROCHLORIDE 20 MG/ML
INJECTION, SOLUTION INFILTRATION; PERINEURAL PRN
Status: DISCONTINUED | OUTPATIENT
Start: 2025-04-30 | End: 2025-04-30

## 2025-04-30 RX ORDER — FENTANYL CITRATE 50 UG/ML
25 INJECTION, SOLUTION INTRAMUSCULAR; INTRAVENOUS
Status: DISCONTINUED | OUTPATIENT
Start: 2025-04-30 | End: 2025-04-30 | Stop reason: HOSPADM

## 2025-04-30 RX ORDER — ALBUTEROL SULFATE 0.83 MG/ML
2.5 SOLUTION RESPIRATORY (INHALATION) EVERY 4 HOURS PRN
Status: DISCONTINUED | OUTPATIENT
Start: 2025-04-30 | End: 2025-04-30 | Stop reason: HOSPADM

## 2025-04-30 RX ORDER — FENTANYL CITRATE 50 UG/ML
25 INJECTION, SOLUTION INTRAMUSCULAR; INTRAVENOUS EVERY 5 MIN PRN
Status: DISCONTINUED | OUTPATIENT
Start: 2025-04-30 | End: 2025-04-30 | Stop reason: HOSPADM

## 2025-04-30 RX ORDER — OXYCODONE HYDROCHLORIDE 5 MG/1
5 TABLET ORAL
Status: COMPLETED | OUTPATIENT
Start: 2025-04-30 | End: 2025-04-30

## 2025-04-30 RX ORDER — HYDROMORPHONE HCL IN WATER/PF 6 MG/30 ML
0.2 PATIENT CONTROLLED ANALGESIA SYRINGE INTRAVENOUS EVERY 5 MIN PRN
Status: DISCONTINUED | OUTPATIENT
Start: 2025-04-30 | End: 2025-04-30 | Stop reason: HOSPADM

## 2025-04-30 RX ORDER — ONDANSETRON 2 MG/ML
4 INJECTION INTRAMUSCULAR; INTRAVENOUS ONCE
Status: COMPLETED | OUTPATIENT
Start: 2025-04-30 | End: 2025-04-30

## 2025-04-30 RX ORDER — NALOXONE HYDROCHLORIDE 0.4 MG/ML
0.1 INJECTION, SOLUTION INTRAMUSCULAR; INTRAVENOUS; SUBCUTANEOUS
Status: DISCONTINUED | OUTPATIENT
Start: 2025-04-30 | End: 2025-04-30 | Stop reason: HOSPADM

## 2025-04-30 RX ORDER — CEFAZOLIN SODIUM 1 G/3ML
INJECTION, POWDER, FOR SOLUTION INTRAMUSCULAR; INTRAVENOUS PRN
Status: DISCONTINUED | OUTPATIENT
Start: 2025-04-30 | End: 2025-04-30

## 2025-04-30 RX ORDER — ONDANSETRON 2 MG/ML
INJECTION INTRAMUSCULAR; INTRAVENOUS PRN
Status: DISCONTINUED | OUTPATIENT
Start: 2025-04-30 | End: 2025-04-30

## 2025-04-30 RX ORDER — MEPERIDINE HYDROCHLORIDE 25 MG/ML
12.5 INJECTION INTRAMUSCULAR; INTRAVENOUS; SUBCUTANEOUS EVERY 5 MIN PRN
Status: DISCONTINUED | OUTPATIENT
Start: 2025-04-30 | End: 2025-04-30 | Stop reason: HOSPADM

## 2025-04-30 RX ORDER — HYDROMORPHONE HCL IN WATER/PF 6 MG/30 ML
0.4 PATIENT CONTROLLED ANALGESIA SYRINGE INTRAVENOUS EVERY 5 MIN PRN
Status: DISCONTINUED | OUTPATIENT
Start: 2025-04-30 | End: 2025-04-30 | Stop reason: HOSPADM

## 2025-04-30 RX ADMIN — GLYCOPYRROLATE 0.2 MG: 0.2 INJECTION, SOLUTION INTRAMUSCULAR; INTRAVENOUS at 16:32

## 2025-04-30 RX ADMIN — HYDROMORPHONE HYDROCHLORIDE 2 MG: 1 INJECTION, SOLUTION INTRAMUSCULAR; INTRAVENOUS; SUBCUTANEOUS at 22:13

## 2025-04-30 RX ADMIN — PHENYLEPHRINE HYDROCHLORIDE 100 MCG: 10 INJECTION INTRAVENOUS at 16:07

## 2025-04-30 RX ADMIN — OXYCODONE HYDROCHLORIDE 5 MG: 5 TABLET ORAL at 17:18

## 2025-04-30 RX ADMIN — ONDANSETRON 4 MG: 2 INJECTION INTRAMUSCULAR; INTRAVENOUS at 16:14

## 2025-04-30 RX ADMIN — FENTANYL CITRATE 50 MCG: 50 INJECTION, SOLUTION INTRAMUSCULAR; INTRAVENOUS at 17:05

## 2025-04-30 RX ADMIN — KETOROLAC TROMETHAMINE 15 MG: 15 INJECTION, SOLUTION INTRAMUSCULAR; INTRAVENOUS at 21:33

## 2025-04-30 RX ADMIN — ONDANSETRON 4 MG: 2 INJECTION, SOLUTION INTRAMUSCULAR; INTRAVENOUS at 21:34

## 2025-04-30 RX ADMIN — FENTANYL CITRATE 50 MCG: 50 INJECTION, SOLUTION INTRAMUSCULAR; INTRAVENOUS at 14:24

## 2025-04-30 RX ADMIN — SODIUM CHLORIDE, SODIUM LACTATE, POTASSIUM CHLORIDE, AND CALCIUM CHLORIDE: .6; .31; .03; .02 INJECTION, SOLUTION INTRAVENOUS at 16:02

## 2025-04-30 RX ADMIN — FENTANYL CITRATE 50 MCG: 50 INJECTION, SOLUTION INTRAMUSCULAR; INTRAVENOUS at 14:16

## 2025-04-30 RX ADMIN — CEFAZOLIN 2 G: 1 INJECTION, POWDER, FOR SOLUTION INTRAMUSCULAR; INTRAVENOUS at 16:01

## 2025-04-30 RX ADMIN — GLYCOPYRROLATE 0.2 MG: 0.2 INJECTION, SOLUTION INTRAMUSCULAR; INTRAVENOUS at 16:05

## 2025-04-30 RX ADMIN — PROPOFOL 200 MG: 10 INJECTION, EMULSION INTRAVENOUS at 16:05

## 2025-04-30 RX ADMIN — DEXAMETHASONE SODIUM PHOSPHATE 8 MG: 4 INJECTION, SOLUTION INTRA-ARTICULAR; INTRALESIONAL; INTRAMUSCULAR; INTRAVENOUS; SOFT TISSUE at 16:05

## 2025-04-30 RX ADMIN — HYDROMORPHONE HYDROCHLORIDE 1 MG: 1 INJECTION, SOLUTION INTRAMUSCULAR; INTRAVENOUS; SUBCUTANEOUS at 21:34

## 2025-04-30 RX ADMIN — LABETALOL 20 MG/4 ML (5 MG/ML) INTRAVENOUS SYRINGE 10 MG: at 16:46

## 2025-04-30 RX ADMIN — ASPIRIN 325 MG: 325 TABLET, COATED ORAL at 08:52

## 2025-04-30 RX ADMIN — MIDAZOLAM 2 MG: 1 INJECTION INTRAMUSCULAR; INTRAVENOUS at 15:24

## 2025-04-30 RX ADMIN — FENTANYL CITRATE 50 MCG: 50 INJECTION, SOLUTION INTRAMUSCULAR; INTRAVENOUS at 16:57

## 2025-04-30 RX ADMIN — FENTANYL CITRATE 100 MCG: 50 INJECTION INTRAMUSCULAR; INTRAVENOUS at 16:05

## 2025-04-30 RX ADMIN — PHENYLEPHRINE HYDROCHLORIDE 100 MCG: 10 INJECTION INTRAVENOUS at 16:12

## 2025-04-30 RX ADMIN — ACETAMINOPHEN 650 MG: 325 TABLET, FILM COATED ORAL at 08:52

## 2025-04-30 RX ADMIN — HYDROMORPHONE HYDROCHLORIDE 4 MG: 2 TABLET ORAL at 08:52

## 2025-04-30 RX ADMIN — LIDOCAINE HYDROCHLORIDE 30 MG: 20 INJECTION, SOLUTION INFILTRATION; PERINEURAL at 16:05

## 2025-04-30 RX ADMIN — PHENYLEPHRINE HYDROCHLORIDE 100 MCG: 10 INJECTION INTRAVENOUS at 16:32

## 2025-04-30 ASSESSMENT — ACTIVITIES OF DAILY LIVING (ADL)
ADLS_ACUITY_SCORE: 52
ADLS_ACUITY_SCORE: 58
ADLS_ACUITY_SCORE: 52
ADLS_ACUITY_SCORE: 58
ADLS_ACUITY_SCORE: 52

## 2025-04-30 ASSESSMENT — COLUMBIA-SUICIDE SEVERITY RATING SCALE - C-SSRS
2. HAVE YOU ACTUALLY HAD ANY THOUGHTS OF KILLING YOURSELF IN THE PAST MONTH?: NO
1. IN THE PAST MONTH, HAVE YOU WISHED YOU WERE DEAD OR WISHED YOU COULD GO TO SLEEP AND NOT WAKE UP?: NO
6. HAVE YOU EVER DONE ANYTHING, STARTED TO DO ANYTHING, OR PREPARED TO DO ANYTHING TO END YOUR LIFE?: NO

## 2025-04-30 ASSESSMENT — COPD QUESTIONNAIRES: COPD: 1

## 2025-04-30 NOTE — PROGRESS NOTES
[facility] Progress Note          Assessment and Plan:     Assessment:    Kidney stone    Intractable pain      Plan:   -NPO.  - Plan to go to the OR later today for cystoscopy, right-sided ureteroscopy laser lithotripsy and basketing, and right ureteral stent placement.  We discussed the possibility of being unable to get the stone on the first try.  If we are unable to get this on the first try, ureteral stent will be placed, will need to return to the OR in several weeks for definitive stone management.  We also discussed that if by chance his case gets pushed back, we may lose the laser sac, and he will need to go forward with just ureteral stent placement and return the OR several weeks.  - Also discussed the patient is able to go home with medical expulsive therapy if he would prefer.  -Possible side effects with an indwelling ureteral stent such as urgency and frequency of urination, dysuria, hematuria, symptoms of urine reflux, and some achiness in the side. Indwelling ureteral stents need to be exchanged every three months or removed by three months.    - Continue IV fluids.  -Patient is already on Hytrin 10 mg daily.  BP slightly hesitant to add on Flomax 0.4 mg at this time due to possible orthostatic hypotension.  -Strain urine.  -Pain and nausea medication per primary service.  - If patient elects to go forward with medical expulsive therapy, okay to discharge from urology perspective.  Otherwise, suspect he can go home after surgical intervention later today.    Mirlande Samuels PA-C   [facility]  [phone]               Interval History:     Continues to endorse pain with poor pain control.  Seems some is more chronic back pain.  Denies N/V/F/C.  WBC 6.9 (9.1).  Hemoglobin 13.5.  Creatinine 0.80 EGFR greater than 90.              Review of Systems:     The 5 point Review of Systems is negative other than noted in the HPI             Medications:     Current  Facility-Administered Medications   Medication Dose Route Frequency Provider Last Rate Last Admin    acetaminophen (TYLENOL) tablet 650 mg  650 mg Oral Q4H PRN Jackson Epps MD   650 mg at 04/30/25 0852    Or    acetaminophen (TYLENOL) Suppository 650 mg  650 mg Rectal Q4H PRN Jackson Epps MD        aspirin (ASA) EC tablet 325 mg  325 mg Oral Daily Jackson Epps MD   325 mg at 04/30/25 0852    atorvastatin (LIPITOR) tablet 80 mg  80 mg Oral QPM Jackson Epps MD   80 mg at 04/29/25 1957    HYDROmorphone (DILAUDID) injection 1 mg  1 mg Intravenous Q3H PRN Jackson Epps MD        HYDROmorphone (DILAUDID) tablet 4 mg  4 mg Oral Q3H PRN Jackson Epps MD   4 mg at 04/30/25 0852    hydrOXYzine HCl (ATARAX) tablet 25 mg  25 mg Oral Q6H PRN Nils Villalta MD   25 mg at 04/29/25 0601    melatonin tablet 5 mg  5 mg Oral At Bedtime PRN Jackson Epps MD        naloxone (NARCAN) injection 0.2 mg  0.2 mg Intravenous Q2 Min PRN Jackson Epps MD        Or    naloxone (NARCAN) injection 0.4 mg  0.4 mg Intravenous Q2 Min PRN Jackson Epps MD        Or    naloxone (NARCAN) injection 0.2 mg  0.2 mg Intramuscular Q2 Min PRN Jackson Epps MD        Or    naloxone (NARCAN) injection 0.4 mg  0.4 mg Intramuscular Q2 Min PRN Jackson Epps MD        nicotine (COMMIT) lozenge 2 mg  2 mg Buccal Q1H PRN Jackson Epps MD        ondansetron (ZOFRAN ODT) ODT tab 4 mg  4 mg Oral Q6H PRN Jackson Epps MD        Or    ondansetron (ZOFRAN) injection 4 mg  4 mg Intravenous Q6H PRN Jackson Epps MD        prochlorperazine (COMPAZINE) injection 10 mg  10 mg Intravenous Q6H PRN Jackson Epps MD        Or    prochlorperazine (COMPAZINE) tablet 10 mg  10 mg Oral Q6H PRN Jackson Epps MD        QUEtiapine (SEROquel)  "tablet 100 mg  100 mg Oral At Bedtime Twila Quiñonez PA-C   100 mg at 04/29/25 2135    senna-docusate (SENOKOT-S/PERICOLACE) 8.6-50 MG per tablet 1 tablet  1 tablet Oral BID PRN Jackson Epps MD        Or    senna-docusate (SENOKOT-S/PERICOLACE) 8.6-50 MG per tablet 2 tablet  2 tablet Oral BID PRN Jackson Epps MD        terazosin (HYTRIN) capsule 10 mg  10 mg Oral At Bedtime Jackson Epps MD   10 mg at 04/29/25 2135                  Physical Exam:   Vitals were reviewed  Patient Vitals for the past 8 hrs:   BP Temp Temp src Pulse Resp SpO2   04/30/25 0825 (!) 162/79 97.7  F (36.5  C) Oral 66 16 95 %   04/30/25 0407 132/67 98.5  F (36.9  C) Oral 77 20 93 %     GEN: NAD, lying in bed  EYES: EOMI  MOUTH: MMM  NECK: Supple  RESP: Unlabored breathing  NEURO: AAO  URO: Urinating on own           Data:   No results found for: \"NTBNPI\", \"NTBNP\"  Lab Results   Component Value Date    WBC 6.2 04/30/2025    WBC 9.1 04/29/2025    WBC 6.7 04/29/2025    HGB 13.5 04/30/2025    HGB 13.6 04/29/2025    HGB 14.4 04/29/2025    HCT 39.4 (L) 04/30/2025    HCT 39.8 (L) 04/29/2025    HCT 42.2 04/29/2025    MCV 91 04/30/2025    MCV 91 04/29/2025    MCV 91 04/29/2025     04/30/2025     04/29/2025     04/29/2025     Lab Results   Component Value Date    INR 0.96 06/13/2023    INR 0.91 06/11/2023      "

## 2025-04-30 NOTE — PLAN OF CARE
"PRIMARY DIAGNOSIS: ACUTE RENAL COLIC    OUTPATIENT/OBSERVATION GOALS TO BE MET BEFORE DISCHARGE  1. Pain Status: Improved-controlled with oral pain medications.    2. Tolerating adequate PO diet: Yes    3. Surgical Intervention planned: Yes    4. Cleared by consultants (if involved): No    5. Return to near baseline physical activity: Yes    Discharge Planner Nurse   Safe discharge environment identified: Yes  Barriers to discharge: Yes       Entered by: Nova Milan RN 04/30/2025 11:35 AM     Patient is alert and oriented x4. BP elevated, but could be d/t pain. Pain being treated with oral PO Dilaudid and Tylenol. Active bowel sounds with LBM yesterday. Denies any pain, urgency, and frequency when voiding. Independent in room. Clear liquid diet until 1130 then NPO.   Plan: OR at 1545 with Urology following.      BP (!) 162/79 (BP Location: Left arm)   Pulse 66   Temp 97.7  F (36.5  C) (Oral)   Resp 16   Ht 1.753 m (5' 9\")   Wt 97.8 kg (215 lb 11.2 oz)   SpO2 95%   BMI 31.85 kg/m      Please review provider order for any additional goals.   Nurse to notify provider when observation goals have been met and patient is ready for discharge.  Problem: Pain Acute  Goal: Optimal Pain Control and Function  Outcome: Progressing  Intervention: Develop Pain Management Plan  Recent Flowsheet Documentation  Taken 4/30/2025 0852 by Nova Milan RN  Pain Management Interventions: medication (see MAR)  Intervention: Prevent or Manage Pain  Recent Flowsheet Documentation  Taken 4/30/2025 1000 by Nova Milan RN  Medication Review/Management:   medications reviewed   high-risk medications identified     Problem: Infection  Goal: Absence of Infection Signs and Symptoms  Outcome: Progressing     Problem: Adult Inpatient Plan of Care  Goal: Plan of Care Review  Description: The Plan of Care Review/Shift note should be completed every shift.  The Outcome Evaluation is a brief statement about your assessment that the patient is " "improving, declining, or no change.  This information will be displayed automatically on your shiftnote.  Outcome: Progressing  Flowsheets (Taken 4/30/2025 1134)  Plan of Care Reviewed With: patient  Overall Patient Progress: no change  Goal: Patient-Specific Goal (Individualized)  Description: You can add care plan individualizations to a care plan. Examples of Individualization might be:  \"Parent requests to be called daily at 9am for status\", \"I have a hard time hearing out of my right ear\", or \"Do not touch me to wake me up as it startlesme\".  Outcome: Progressing  Goal: Absence of Hospital-Acquired Illness or Injury  Outcome: Progressing  Intervention: Identify and Manage Fall Risk  Recent Flowsheet Documentation  Taken 4/30/2025 1000 by Nova Milan RN  Safety Promotion/Fall Prevention: nonskid shoes/slippers when out of bed  Intervention: Prevent Skin Injury  Recent Flowsheet Documentation  Taken 4/30/2025 1000 by Nova Milan, RN  Body Position: position changed independently  Goal: Optimal Comfort and Wellbeing  Outcome: Progressing  Intervention: Monitor Pain and Promote Comfort  Recent Flowsheet Documentation  Taken 4/30/2025 0852 by Nova Milan, RN  Pain Management Interventions: medication (see MAR)  Goal: Readiness for Transition of Care  Outcome: Progressing   Goal Outcome Evaluation:      Plan of Care Reviewed With: patient    Overall Patient Progress: no changeOverall Patient Progress: no change           "

## 2025-04-30 NOTE — ANESTHESIA PREPROCEDURE EVALUATION
Anesthesia Pre-Procedure Evaluation    Patient: Dinesh Farfan   MRN: [id number] : 1965        Procedure : Procedure(s):  Cystoureteroscopy with right retrograde pyelogram, holmium laser lithotripsy of right ureteral calculus and right stent insertion          Past Medical History:   Diagnosis Date    Herniated lumbar disc without myelopathy       History reviewed. No pertinent surgical history.   Allergies   Allergen Reactions    Morphine Unknown     Takes hydrocodone without problem.      Social History     Tobacco Use    Smoking status: Every Day     Current packs/day: 1.00     Types: Cigarettes    Smokeless tobacco: Not on file    Tobacco comments:     Last cigarette 2 days ago   Substance Use Topics    Alcohol use: Yes     Comment: rarely      Wt Readings from Last 1 Encounters:   25 97.8 kg (215 lb 11.2 oz)        Anesthesia Evaluation   Pt has had prior anesthetic.     History of anesthetic complications       ROS/MED HX  ENT/Pulmonary:     (+) sleep apnea,                         COPD,              Neurologic:     (+)          CVA,                      Cardiovascular:    (-) CAD   METS/Exercise Tolerance:     Hematologic:       Musculoskeletal:       GI/Hepatic:    (-) GERD   Renal/Genitourinary:     (+) renal disease,      Nephrolithiasis , BPH,      Endo:     (+)               Obesity,    (-) Type II DM   Psychiatric/Substance Use:     (+) psychiatric history anxiety       Infectious Disease:       Malignancy:       Other:            Physical Exam    Airway        Mallampati: II   TM distance: > 3 FB   Neck ROM: full   Mouth opening: > 3 cm    Respiratory Devices and Support         Dental  no notable dental history     (+) Modest Abnormalities - crowns, retainers, 1 or 2 missing teeth      Cardiovascular   cardiovascular exam normal          Pulmonary   pulmonary exam normal                OUTSIDE LABS:  CBC:   Lab Results   Component Value Date    WBC 6.2 2025    WBC 9.1 2025  "   HGB 13.5 04/30/2025    HGB 13.6 04/29/2025    HCT 39.4 (L) 04/30/2025    HCT 39.8 (L) 04/29/2025     04/30/2025     04/29/2025     BMP:   Lab Results   Component Value Date     04/30/2025     04/29/2025    POTASSIUM 4.1 04/30/2025    POTASSIUM 4.8 04/29/2025    CHLORIDE 103 04/30/2025    CHLORIDE 104 04/29/2025    CO2 27 04/30/2025    CO2 26 04/29/2025    BUN 24.7 (H) 04/30/2025    BUN 24.8 (H) 04/29/2025    CR 0.80 04/30/2025    CR 0.97 04/29/2025     (H) 04/30/2025    GLC 97 04/30/2025     COAGS:   Lab Results   Component Value Date    PTT 22 06/13/2023    INR 0.96 06/13/2023     POC: No results found for: \"BGM\", \"HCG\", \"HCGS\"  HEPATIC: No results found for: \"ALBUMIN\", \"PROTTOTAL\", \"ALT\", \"AST\", \"GGT\", \"ALKPHOS\", \"BILITOTAL\", \"BILIDIRECT\", \"KACEY\"  OTHER:   Lab Results   Component Value Date    LACT 0.9 06/13/2023    A1C 6.2 (H) 06/19/2024    DUSTIN 8.9 04/30/2025       Anesthesia Plan    ASA Status:  3    NPO Status:  NPO Appropriate    Anesthesia Type: General.     - Airway: LMA   Induction: Propofol, Intravenous.   Maintenance: Balanced.        Consents    Anesthesia Plan(s) and associated risks, benefits, and realistic alternatives discussed. Questions answered and patient/representative(s) expressed understanding.     - Discussed:     - Discussed with:  Patient            Postoperative Care    Pain management: Multi-modal analgesia.   PONV prophylaxis: Ondansetron (or other 5HT-3), Dexamethasone or Solumedrol, Background Propofol Infusion     Comments:               Bambi Richardson MD, MD    Clinically Significant Risk Factors Present on Admission                 # Drug Induced Platelet Defect: home medication list includes an antiplatelet medication   # Hypertension: Home medication list includes antihypertensive(s)           # Obesity: Estimated body mass index is 31.85 kg/m  as calculated from the following:    Height as of this encounter: 1.753 m (5' 9\").    Weight as " of this encounter: 97.8 kg (215 lb 11.2 oz).

## 2025-04-30 NOTE — PLAN OF CARE
PRIMARY DIAGNOSIS: Kidney stone   OUTPATIENT/OBSERVATION GOALS TO BE MET BEFORE DISCHARGE:  ADLs back to baseline: Yes    Activity and level of assistance: Ambulating independently.    Pain status: Improved-controlled with oral pain medications.    Return to near baseline physical activity: Yes     Discharge Planner Nurse   Safe discharge environment identified: Yes  Barriers to discharge: Yes       Entered by: Bibiana Andrade RN 04/29/2025 8:37 PM    Vitals are Temp: 98.2  F (36.8  C) Temp src: Oral BP: 106/81 Pulse: 104   Resp: 16 SpO2: 93 %.  Patient is Alert and Oriented x4. They are independent with no assistive devices .  Pt is a Regular diet. They are complaining of 8/10 pain in their lower back and R flank. Tylenol and Dilaudid given for pain.  Medicatons decreased pain. Patient is Saline locked. NPO at midnight for cystoscopy in the morning. Urology is following.        Please review provider order for any additional goals.   Nurse to notify provider when observation goals have been met and patient is ready for discharge.    Goal Outcome Evaluation:      Plan of Care Reviewed With: patient    Overall Patient Progress: improvingOverall Patient Progress: improving

## 2025-04-30 NOTE — PROGRESS NOTES
Patient discharged home with wife. Patients blood pressure was elevated before leaving hospital. Reported blood pressure to [facility], no interventions at this time. Patient discharged to home. Patient stable upon discharge and discharge instructions reported off to wife. Wife was able to teach back discharge instructions.

## 2025-04-30 NOTE — DISCHARGE SUMMARY
"[facility]  Hospitalist Discharge Summary      Date of Admission:  4/29/2025  Date of Discharge:  4/30/2025  6:20 PM  Discharging Provider: Kellee Carter PA-C  Discharge Service: Hospitalist Service    Discharge Diagnoses   Renal colic  3 mm obstructing ureteral stone with mild R hydronephrosis     Clinically Significant Risk Factors     # Obesity: Estimated body mass index is 31.85 kg/m  as calculated from the following:    Height as of this encounter: 1.753 m (5' 9\").    Weight as of this encounter: 97.8 kg (215 lb 11.2 oz).       Follow-ups Needed After Discharge   Follow-up Appointments       Follow Up      Follow up with Urology as directed. .                Unresulted Labs Ordered in the Past 30 Days of this Admission       No orders found for last 31 day(s).        These results will be followed up by PCP    Discharge Disposition   Discharged to home  Condition at discharge: Stable    Hospital Course   Dinesh Farfan is a 59 year old male with history of cecal/appendiceal adenocarcinoma s/p R hemicolectomy in remission, chronic back pain with opioid dependence, COPD with ongoing tobacco abuse, stroke, carotid artery stenosis, and BPH, who was admitted on 4/29/2025 with renal colic. He presented with right flank pain and found to have right ureteropelvic junction stone with mild right hydronephrosis.    Obstructive Right Ureteral Stone w/ Hydronephrosis  Presents with sudden onset right flank pain with nausea and vomiting. UA with hematuria, CT abdomen pelvis with right ureteropelvic junction stone with mild right hydronephrosis  - admitted to OBS for symptom control  - Urology consulted, recommended conservative management initially.   - sx not controlled, pt taken to OR on 4/30 for cystoscopy with stent placement   - pt was recovered in PACU and discharged home. Pt will follow up with Urology for definitive stone management and stent removal    Focal narrowing of left common " iliac artery: Needs smoking cessation and outpatient follow-up     Other Issues  -Chronic COPD: stable  -Tobacco abuse:  cessation  -Chronic back pain with opioid dependence: continue home meds  -Chronic stroke & Carotid Artery Stenosis: continue home aspirin and statin  -Cecal/appendiceal adenoCA (pMMR) s/p R hemicolectomy: In remission      Consultations This Hospital Stay   UROLOGY IP CONSULT    Code Status   Full Code    Time Spent on this Encounter   I, Kellee Carter PA-C, personally saw the patient today and spent less than or equal to 30 minutes discharging this patient.       Kellee Carter PA-C  [facility] PREOP/POSTOP  [address] [facility] [address]  Phone: [phone]  Fax: [phone]  ______________________________________________________________________    Physical Exam   Vital Signs: Temp: 97.7  F (36.5  C) Temp src: Oral BP: (!) 162/79 Pulse: 66   Resp: 16 SpO2: 94 % O2 Device: None (Room air)    Weight: 215 lbs 11.2 oz    GENERAL:  Comfortable.  PSYCH: pleasant, oriented, No acute distress.  HEART:  RRR.  LUNGS:  Normal Respiratory effort.   EXTREMITIES:  able to ambulate  NEUROLOGIC:  grossly intact        Primary Care Physician   Gian Hoffman    Discharge Orders      Reason for your hospital stay    Renal colic. Urology consulted and took you to the OR for cystoscopy. Anticipate recovery and discharge home from PACU.     Activity    Your activity upon discharge: activity as tolerated     Follow Up    Follow up with Urology as directed. .     When to contact your care team    Call your primary doctor if you have any of the following: temperature greater than 101, increased pain, persistent hematuria or inability to urinate.     Diet    Follow this diet upon discharge: Regular       Significant Results and Procedures   Most Recent 3 CBC's:  Recent Labs   Lab Test 04/30/25  2131 04/30/25  0757 04/29/25  0615   WBC 8.9 6.2 9.1   HGB 14.3 13.5 13.6   MCV  88 91 91    172 173     Most Recent 3 BMP's:  Recent Labs   Lab Test 04/30/25  2131 04/30/25  1412 04/30/25  0757 04/29/25  0615     --  139 139   POTASSIUM 4.1  --  4.1 4.8   CHLORIDE 102  --  103 104   CO2 22  --  27 26   BUN 25.2*  --  24.7* 24.8*   CR 0.94  --  0.80 0.97   ANIONGAP 15  --  9 9   DUSTIN 9.6  --  8.9 9.2   * 100* 97 128*     Most Recent 2 LFT's:  Recent Labs   Lab Test 04/30/25  2131   AST 22   ALT 19   ALKPHOS 88   BILITOTAL 0.9     7-Day Micro Results       Collected Updated Procedure Result Status      05/01/2025 0041 05/01/2025 0046 Urine Culture [[id number]]   Urine, Midstream    In process Component Value   No component results                     Most Recent Urinalysis:  Recent Labs   Lab Test 04/29/25  0338   COLOR Orange*   APPEARANCE Clear   URINEGLC Negative   URINEBILI Negative   URINEKETONE Negative   SG 1.020   UBLD Large*   URINEPH 6.0   PROTEIN 70*   NITRITE Negative   LEUKEST Negative   RBCU >182*   WBCU 0   ,   Results for orders placed or performed during the hospital encounter of 04/29/25   Abd/pelvis CT,  IV  contrast only TRAUMA / AAA    Narrative    EXAM: CT ABDOMEN PELVIS W CONTRAST  LOCATION: [facility]  DATE: 4/29/2025    INDICATION: Acute onset of right flank pain.  COMPARISON: None.  TECHNIQUE: CT scan of the abdomen and pelvis was performed following injection of IV contrast. Multiplanar reformats were obtained. Dose reduction techniques were used.  CONTRAST: 100 mL Isovue 370    FINDINGS:   LOWER CHEST: Normal.    HEPATOBILIARY: Normal liver and gallbladder.    PANCREAS: Normal.    SPLEEN: Normal.    ADRENAL GLANDS: Normal.    KIDNEYS/BLADDER: A 3 mm stone is seen at the right ureteropelvic junction with mild right hydronephrosis. Bilateral kidneys and bladder are otherwise normal. A 1.5 cm simple appearing cyst in the left kidney does not require follow-up.    BOWEL: Postsurgical changes of right partial colectomy with  ileocolic anastomosis in the right upper quadrant. No abnormal bowel thickening or bowel obstruction. Multiple diverticula seen in the left colon without acute diverticulitis. No free fluid or   free air.    LYMPH NODES: Normal.    VASCULATURE: Moderate aortoiliofemoral atherosclerotic calcifications with mild thrombus formation in the abdominal aorta. Focal moderate stenosis in the left common iliac artery.    PELVIC ORGANS: Normal.    MUSCULOSKELETAL: Multilevel mild and moderate degenerative disc space narrowing in the thoracolumbar spine most advanced at L3/4. Mild degenerative anterolisthesis of L5 relative to L4 and S1. Bilateral L5 pars interarticularis defects noted. Mild   anterior osteophyte formations in the spine. No suspicious osseous lesions or acute fractures.        Impression    IMPRESSION:     1.  3 mm right ureteropelvic junction stone with mild right hydronephrosis.    2.  Focal moderate narrowing of left common iliac artery.    3.  Colonic diverticulosis without acute diverticulitis.   XR Surgery ERASMO L/T 5 Min Fluoro w Stills    Narrative    This exam was marked as non-reportable because it will not be read by a   radiologist or a [place] non-radiologist provider.             Discharge Medications   Current Discharge Medication List        CONTINUE these medications which have NOT CHANGED    Details   albuterol (PROAIR HFA/PROVENTIL HFA/VENTOLIN HFA) 108 (90 Base) MCG/ACT inhaler Inhale 2 puffs into the lungs every 6 hours as needed for shortness of breath, wheezing or cough      aspirin (ASA) 325 MG EC tablet Take 1 tablet (325 mg) by mouth daily.  Qty: 90 tablet, Refills: 3    Associated Diagnoses: History of stroke      atorvastatin (LIPITOR) 80 MG tablet Take 1 tablet (80 mg) by mouth every evening.  Qty: 90 tablet, Refills: 3    Associated Diagnoses: History of stroke      budesonide-formoterol (SYMBICORT/BREYNA) 160-4.5 MCG/ACT Inhaler Inhale 2 puffs into the lungs 2 times daily.       DULoxetine (CYMBALTA) 60 MG capsule Take 60 mg by mouth daily.      HYDROcodone-acetaminophen (NORCO)  MG per tablet Take 1 tablet by mouth 4 times daily as needed.      hydrOXYzine (ATARAX) 25 MG tablet Take 25 mg by mouth 3 times daily as needed for anxiety      NARCAN 4 MG/0.1ML nasal spray USE AS NEEDED FOR OPIOID OVERDOSE PER INSTRUCTION      QUEtiapine (SEROQUEL) 50 MG tablet Take  mg by mouth At Bedtime      tadalafil (CIALIS) 10 MG tablet Take 10 mg by mouth as needed.      terazosin (HYTRIN) 10 MG capsule Take 10 mg by mouth daily           Allergies   Allergies   Allergen Reactions    Morphine Unknown     Takes hydrocodone without problem.

## 2025-04-30 NOTE — PLAN OF CARE
PRIMARY DIAGNOSIS: Kidney stone      OUTPATIENT/OBSERVATION GOALS TO BE MET BEFORE DISCHARGE:  ADLs back to baseline: Yes     Activity and level of assistance: Ambulating independently.     Pain status: Improved-controlled with oral pain medications.     Return to near baseline physical activity: Yes             Discharge Planner Nurse  Safe discharge environment identified: Yes  Barriers to discharge: Yes       Entered by: Bibiana Andrade RN 04/30/2025 4:38 AM     Vitals are Temp: 98.5  F (36.9  C) Temp src: Oral BP: 132/67 Pulse: 77   Resp: 20 SpO2: 93 %.  Patient is Alert and Oriented x4. They are independent with no assistive devices. Pt is a Regular diet. They are complaining of 5/10 pain in their lower back and R flank. Tylenol and Dilaudid given for pain. Medications decreased pain. Patient is Saline locked. NPO at midnight for cystoscopy in the morning. Urology is following.         Please review provider order for any additional goals.   Nurse to notify provider when observation goals have been met and patient is ready for discharge.          Goal Outcome Evaluation:      Plan of Care Reviewed With: patient    Overall Patient Progress: improvingOverall Patient Progress: improving

## 2025-04-30 NOTE — DISCHARGE INSTRUCTIONS
Maximum acetaminophen (Tylenol) dose from all sources should not exceed 4 grams (4000 mg) per day. Tylenol given at 8:52 am on 4/30 you can take tylenol you can take tylenol at anytime if needed.        DR. MIKIE LUCIO   CLINIC PHONE NUMBER:  [phone]  [facility] UROLOGY     CYSTOSCOPY DISCHARGE INSTRUCTIONS    YOU MAY GO BACK TO YOUR NORMAL DIET AND ACTIVITY, UNLESS YOUR DOCTOR TELLS YOU NOT TO.    FOR THE NEXT TWO DAYS, YOU MAY NOTICE:    SOME BLOOD IN YOUR URINE.  SOME BURNING WHEN YOU URINATE (USE THE TOILET).  AN URGE TO URINATE MORE OFTEN.  BLADDER SPASMS.    THESE ARE NORMAL AFTER THE PROCEDURE.  THEY SHOULD GO AWAY AFTER A DAY OR TWO.  TO RELIEVE THESE PROBLEMS:     DRINK 6 TO 8 LARGE GLASSES OF WATER EACH DAY (INCLUDES DRINKS AT MEALS).  THIS WILL HELP CLEAR THE URINE.    TAKE WARM BATHS TO RELIEVE PAIN AND BLADDER SPASMS.  DO NOT ADD ANYTHING TO THE BATH WATER.    YOUR DOCTOR MAY PRESCRIBE PAIN MEDICINE.  YOU MAY ALSO TAKE TYLENOL (ACETAMINOPHEN) FOR PAIN.    CALL YOUR SURGEON IF YOU HAVE:    A FEVER OVER 100 DEGREES FOR MORE THAN A DAY.  CHECK YOUR TEMPERATURE UNDER YOUR TONGUE.    CHILLS.    FAILURE TO URINATE (NO URINE COMES OUT WHEN YOU TRY TO USE THE TOILET).  TRY SOAKING IN A BATHTUB FULL OF WARM WATER.  IF STILL NO URINE, CALL YOUR DOCTOR.    A LOT OF BLOOD IN THE URINE, OR BLOOD CLOTS LARGER THAN A NICKEL.      PAIN IN THE BACK OR BELLY AREA (ABDOMEN).    PAIN OR SPASMS THAT ARE NOT RELIEVED BY WARM TUB BATHS AND PAIN MEDICINE.      SEVERE PAIN, BURNING OR OTHER PROBLEMS WHILE PASSING URINE.    PAIN THAT GETS WORSE AFTER TWO DAYS.

## 2025-04-30 NOTE — ANESTHESIA CARE TRANSFER NOTE
Patient: Dinesh Farfan    Procedure: Procedure(s):  Cystoscopy, right reteroscopy with right retrograde pyelogram,  and right stent insertion       Diagnosis: Kidney stone [N20.0]  Intractable pain [R52]  Diagnosis Additional Information: No value filed.    Anesthesia Type:   General     Note:    Oropharynx: oropharynx clear of all foreign objects  Level of Consciousness: awake  Oxygen Supplementation: face mask  Level of Supplemental Oxygen (L/min / FiO2): 8  Independent Airway: airway patency satisfactory and stable  Dentition: dentition unchanged  Vital Signs Stable: post-procedure vital signs reviewed and stable  Report to RN Given: handoff report given  Patient transferred to: PACU    Handoff Report: Identifed the Patient, Identified the Reponsible Provider, Reviewed the pertinent medical history, Discussed the surgical course, Reviewed Intra-OP anesthesia mangement and issues during anesthesia, Set expectations for post-procedure period and Allowed opportunity for questions and acknowledgement of understanding      Vitals:  Vitals Value Taken Time   /107 04/30/25 1646   Temp 97.1  F (36.2  C) 04/30/25 1645   Pulse 78 04/30/25 1650   Resp 12 04/30/25 1650   SpO2 100 % 04/30/25 1650   Vitals shown include unfiled device data.    Electronically Signed By: SARA Dawson CRNA  April 30, 2025  4:52 PM

## 2025-04-30 NOTE — ANESTHESIA PROCEDURE NOTES
Airway       Patient location during procedure: OR  Staff -        Performed By: CRNAIndications and Patient Condition       Indications for airway management: deangelo-procedural        Final Airway Details       Final airway type: supraglottic airway    Supraglottic Airway Details        Type: LMA       Brand: I-Gel       LMA size: 5    Post intubation assessment        Ease of procedure: easy

## 2025-04-30 NOTE — PLAN OF CARE
PRIMARY DIAGNOSIS: Kidney stone     OUTPATIENT/OBSERVATION GOALS TO BE MET BEFORE DISCHARGE:  ADLs back to baseline: Yes     Activity and level of assistance: Ambulating independently.     Pain status: Improved-controlled with oral pain medications.     Return to near baseline physical activity: Yes             Discharge Planner Nurse  Safe discharge environment identified: Yes  Barriers to discharge: Yes       Entered by: Bibiana Andrade RN 04/30/2025 2:27 AM     Vitals are Temp: 98.2  F (36.8  C) Temp src: Oral BP: 132/64 Pulse: 81   Resp: 20 SpO2: 92 %.  Patient is Alert and Oriented x4. They are independent with no assistive devices. Pt is a Regular diet. They are complaining of 5/10 pain in their lower back and R flank. Tylenol and Dilaudid given for pain. Medications decreased pain. Patient is Saline locked. NPO at midnight for cystoscopy in the morning. Urology is following.         Please review provider order for any additional goals.   Nurse to notify provider when observation goals have been met and patient is ready for discharge.    Goal Outcome Evaluation:      Plan of Care Reviewed With: patient    Overall Patient Progress: improvingOverall Patient Progress: improving

## 2025-04-30 NOTE — OP NOTE
OPERATIVE REPORT    PREOPERATIVE DIAGNOSIS: Right ureteral stone    POSTOPERATIVE DIAGNOSIS: Same    PROCEDURES PERFORMED: Cystoscopy, right retrograde pyelogram, interpretation of fluoroscopic images, right ureteroscopy, right ureteral stent placement    SURGEON: Frederic Montemayor M.D.    ANESTHESIA: General    COMPLICATIONS: None.     SIGNIFICANT FINDINGS: Narrow ureter, stent placed only      BRIEF OPERATIVE INDICATIONS: Dinesh Farfan is a(n) 59 year old male who presented with a proximal right ureteral stone who now presents for stone extraction.  After a discussion of all risks, benefits, and alternatives, the patient elected to proceed with definitive stone management. The patient understands the potential need for more than one procedure to eliminate all stone burden.      DESCRIPTION OF PROCEDURE:  After informed consent was obtained, the patient was transported to the operating room & placed supine on the table. After adequate anesthesia was induced, the patient was placed in lithotomy and prepped and draped in the usual sterile fashion. A timeout was taken to confirm correct patient, procedure and laterality. Pre-operative IV antibiotics were administered.     I introduced the 22 [place]an rigid cystoscope through the urethra into the bladder and performed cystoscopy.  The bladder was normal throughout.  I cannulated the right ureteral orifice with a ureteral catheter and I performed a retrograde pyelogram.  The right ureter appeared to be of narrow caliber with no hydronephrosis.  I then passed a Glidewire into the kidney and backloaded off the ureteral catheter along with the scope.  I clamped the Glidewire to the drape.  Alongside the Glidewire I passed the rigid scope into the right ureter.  With the help of a second Glidewire I was able to gain access only to the mid right ureter.  I backloaded off the scope.  I passed an 11/13 [place]an ureteral access sheath to the level of the mid right ureter.  I  inserted a 5 cystoscope through the access sheath and attempted ureteroscopy.  I could perform ureteroscopy to the proximal ureter but the ureter became quite narrow.  I was not able to gain access above this area.  I decided to place a stent.  I removed the scope.  I removed the access sheath.  I reload the cystoscope over the Glidewire until the right ureter orifice was visualized.  I passed a 7 x 26 double-J ureteral stent over the wire.  The wire was pulled back and a good curl was seen in the renal pelvis with fluoroscopy.  A good curl was seen in the bladder with direct visualization.  The bladder was drained    The patient tolerated the procedure well without complications.  After appropriate period of stenting he will return to the operating for stone extraction and stent removal.

## 2025-04-30 NOTE — ANESTHESIA POSTPROCEDURE EVALUATION
Patient: Dinesh Farfan    Procedure: Procedure(s):  Cystoscopy, right reteroscopy with right retrograde pyelogram,  and right stent insertion       Anesthesia Type:  General    Note:  Disposition: Outpatient   Postop Pain Control: Uneventful            Sign Out: Well controlled pain   PONV: No   Neuro/Psych: Uneventful            Sign Out: Acceptable/Baseline neuro status   Airway/Respiratory: Uneventful            Sign Out: Acceptable/Baseline resp. status   CV/Hemodynamics: Uneventful            Sign Out: Acceptable CV status; No obvious hypovolemia; No obvious fluid overload   Other NRE: NONE   DID A NON-ROUTINE EVENT OCCUR? No           Last vitals:  Vitals Value Taken Time   /91 04/30/25 1700   Temp 97.1  F (36.2  C) 04/30/25 1657   Pulse 77 04/30/25 1725   Resp 11 04/30/25 1725   SpO2 93 % 04/30/25 1727   Vitals shown include unfiled device data.    Electronically Signed By: Kaleb Gonzalez MD  April 30, 2025  5:33 PM  
Unknown

## 2025-05-01 ENCOUNTER — PATIENT OUTREACH (OUTPATIENT)
Dept: CARE COORDINATION | Facility: CLINIC | Age: 60
End: 2025-05-01
Payer: MEDICARE

## 2025-05-01 ENCOUNTER — APPOINTMENT (OUTPATIENT)
Dept: CT IMAGING | Facility: CLINIC | Age: 60
End: 2025-05-01
Attending: EMERGENCY MEDICINE
Payer: MEDICARE

## 2025-05-01 VITALS
TEMPERATURE: 98.6 F | SYSTOLIC BLOOD PRESSURE: 135 MMHG | BODY MASS INDEX: 31.93 KG/M2 | HEART RATE: 73 BPM | DIASTOLIC BLOOD PRESSURE: 77 MMHG | HEIGHT: 69 IN | WEIGHT: 215.61 LBS | RESPIRATION RATE: 20 BRPM | OXYGEN SATURATION: 94 %

## 2025-05-01 DIAGNOSIS — N20.1 URETERAL STONE: Primary | ICD-10-CM

## 2025-05-01 PROCEDURE — 250N000013 HC RX MED GY IP 250 OP 250 PS 637: Performed by: EMERGENCY MEDICINE

## 2025-05-01 PROCEDURE — 250N000011 HC RX IP 250 OP 636: Performed by: EMERGENCY MEDICINE

## 2025-05-01 PROCEDURE — 250N000009 HC RX 250: Performed by: EMERGENCY MEDICINE

## 2025-05-01 PROCEDURE — 74177 CT ABD & PELVIS W/CONTRAST: CPT

## 2025-05-01 PROCEDURE — 87086 URINE CULTURE/COLONY COUNT: CPT | Performed by: EMERGENCY MEDICINE

## 2025-05-01 RX ORDER — IOPAMIDOL 755 MG/ML
500 INJECTION, SOLUTION INTRAVASCULAR ONCE
Status: COMPLETED | OUTPATIENT
Start: 2025-05-01 | End: 2025-05-01

## 2025-05-01 RX ORDER — CEFAZOLIN SODIUM 2 G/50ML
2 SOLUTION INTRAVENOUS
OUTPATIENT
Start: 2025-05-01

## 2025-05-01 RX ORDER — CEFAZOLIN SODIUM 2 G/50ML
2 SOLUTION INTRAVENOUS SEE ADMIN INSTRUCTIONS
OUTPATIENT
Start: 2025-05-01

## 2025-05-01 RX ORDER — OXYCODONE HYDROCHLORIDE 5 MG/1
5 TABLET ORAL ONCE
Status: COMPLETED | OUTPATIENT
Start: 2025-05-01 | End: 2025-05-01

## 2025-05-01 RX ORDER — ACETAMINOPHEN 325 MG/1
975 TABLET ORAL ONCE
OUTPATIENT
Start: 2025-05-01 | End: 2025-05-01

## 2025-05-01 RX ORDER — ACETAMINOPHEN 650 MG/1
650 SUPPOSITORY RECTAL ONCE
OUTPATIENT
Start: 2025-05-01

## 2025-05-01 RX ADMIN — IOPAMIDOL 100 ML: 755 INJECTION, SOLUTION INTRAVENOUS at 00:02

## 2025-05-01 RX ADMIN — OXYCODONE HYDROCHLORIDE 5 MG: 5 TABLET ORAL at 01:10

## 2025-05-01 RX ADMIN — SODIUM CHLORIDE 65 ML: 9 INJECTION, SOLUTION INTRAVENOUS at 00:05

## 2025-05-01 ASSESSMENT — ACTIVITIES OF DAILY LIVING (ADL): ADLS_ACUITY_SCORE: 58

## 2025-05-01 NOTE — ED PROVIDER NOTES
"  Emergency Department Note      History of Present Illness     Chief Complaint   Abdominal Pain      HPI   Dinesh Farfan is a 59 year old male with a history of kidney stone and cecal cancer presenting to the ED for abdominal pain. He is accompanied with his wife who states that he's had significant bilateral abdominal pain since eating dinner after his ureteral stent placement at 1700 tonight. The pain is more localized on the right-side, which is where he had his ureteral stent for a kidney stone. He also had a similar episode after eating something in the hospital and has only had Tylenol today. Denies emesis.  States this pain is significantly worse than the initial pain with his kidney stone and feels different as well.  Is concerned that there is something else going on.    Independent Historian   Wife as detailed above.    Review of External Notes   I looked for his discharge summary which is not yet signed.    Past Medical History     Medical History and Problem List   Herniated lumbar disc without myelopathy   Tobacco abuse  EDUARDO  Cecal cancer  Intracranial atherosclerosis  Kidney stone  Carotid artery occlusion with cerebral infarction     Medications   Lipitor  Flomax  Albuterol  Symbicort  Cymbalta  Atarax  Seroquel  Cialis  Hytrin     Surgical History   Inguinal hernia repair, bilateral  Colonoscopy 2x  ORIF femur & intramed yani   Carpal tunnel, right  Lap hemicolectomy, right  Inguinal hernia repair, right  Rotator cuff repair, right  Left shoulder surgery    Physical Exam     Patient Vitals for the past 24 hrs:   BP Temp Temp src Pulse Resp SpO2 Height Weight   04/30/25 2345 135/77 -- -- 73 -- 94 % -- --   04/30/25 2245 130/69 -- -- 70 -- 95 % -- --   04/30/25 2230 (!) 140/77 -- -- 70 -- 93 % -- --   04/30/25 2215 (!) 155/81 -- -- -- -- 97 % -- --   04/30/25 2109 (!) 169/103 98.6  F (37  C) Temporal 92 20 96 % 1.753 m (5' 9\") 97.8 kg (215 lb 9.8 oz)     Physical Exam  Eyes:               Sclera " white; Pupils are equal and round  ENT:                External ears and nares normal  CV:                  Rate as above with regular rhythm   Resp:               Breath sounds clear and equal bilaterally                          Non-labored, no retractions or accessory muscle use  GI:                   Abdomen is soft, R mid abdominal tenderness                          No rebound tenderness or peritoneal features  MS:                  Moves all extremities  Skin:                Warm and dry  Neuro:             Speech is normal and fluent. No apparent deficit.  Moaning, bent forward over side of [facility]    Diagnostics     Lab Results   Labs Ordered and Resulted from Time of ED Arrival to Time of ED Departure   COMPREHENSIVE METABOLIC PANEL - Abnormal       Result Value    Sodium 139      Potassium 4.1      Carbon Dioxide (CO2) 22      Anion Gap 15      Urea Nitrogen 25.2 (*)     Creatinine 0.94      GFR Estimate >90      Calcium 9.6      Chloride 102      Glucose 168 (*)     Alkaline Phosphatase 88      AST 22      ALT 19      Protein Total 7.4      Albumin 4.5      Bilirubin Total 0.9     CBC WITH PLATELETS AND DIFFERENTIAL - Abnormal    WBC Count 8.9      RBC Count 4.62      Hemoglobin 14.3      Hematocrit 40.8      MCV 88      MCH 31.0      MCHC 35.0      RDW 12.6      Platelet Count 180      % Neutrophils 92      % Lymphocytes 7      % Monocytes 1      % Eosinophils 0      % Basophils 0      % Immature Granulocytes 0      NRBCs per 100 WBC 0      Absolute Neutrophils 8.1      Absolute Lymphocytes 0.6 (*)     Absolute Monocytes 0.1      Absolute Eosinophils 0.0      Absolute Basophils 0.0      Absolute Immature Granulocytes 0.0      Absolute NRBCs 0.0     LIPASE - Normal    Lipase 15     ROUTINE UA WITH MICROSCOPIC REFLEX TO CULTURE       Imaging   US Abdomen Limited   Final Result   IMPRESSION:   1.  Normal limited abdominal ultrasound.            CT Abdomen Pelvis w Contrast    (Results Pending)      Independent Interpretation   None    ED Course      Medications Administered   Medications   ketamine (KETALAR) injection 5 mg (has no administration in time range)   ketorolac (TORADOL) injection 15 mg (15 mg Intravenous $Given 4/30/25 2133)   ondansetron (ZOFRAN) injection 4 mg (4 mg Intravenous $Given 4/30/25 2134)   HYDROmorphone (DILAUDID) injection 1 mg (1 mg Intravenous $Given 4/30/25 2134)   HYDROmorphone (DILAUDID) injection 2 mg (2 mg Intravenous $Given 4/30/25 2213)   sodium chloride 0.9 % bag for CT scan flush (65 mLs Intravenous $Given 5/1/25 0005)   iopamidol (ISOVUE-370) solution 500 mL (100 mLs Intravenous $Given 5/1/25 0002)       Procedures   Procedures     Discussion of Management   None    ED Course   ED Course as of 05/01/25 0015   Wed Apr 30, 2025 2121 I obtained history and examined the patient as noted above.     2214 I rechecked the patient.    2347 I rechecked him.  He is looking and feeling significantly better.       Additional Documentation  None    Medical Decision Making / Diagnosis     CMS Diagnoses: None    MIPS       None    MDM   Symptoms may be secondary to ureteral colic in the setting of his stent placement however given the acute worsening after eating, upper abdominal pathology such as hepatitis, pancreatitis, biliary pathology were all considered.  Lab workup was not consistent with these etiologies.  Ultrasound showed no gallstones.  On recheck he is feeling better after 2 rounds of medications.  CT scan was obtained to evaluate for anything else that could have changed that could be contributing to his abrupt onset severe symptoms.  However if no new pathology is found, then this was recurrent ureteral colic.  CT scan result is pending at the time of signout.    Disposition   The patient was discharged.     Addendum:  CT Abdomen Pelvis w Contrast   Final Result   IMPRESSION:    1.  Interval placement of a right ureteral stent with fragmentation of the previously seen  proximal right ureteral calculus which now lies within the right renal pelvis. No hydronephrosis or perinephric inflammatory change. No  acute abnormality is    identified.         US Abdomen Limited   Final Result   IMPRESSION:   1.  Normal limited abdominal ultrasound.              CT scan returned prior to me leaving the department.  I discussed the results with him and his wife.  His pain continues to be well-controlled.  Appropriate for discharge.    Diagnosis     ICD-10-CM    1. Generalized abdominal pain  R10.84       2. Ureteral stent present  Z96.0       3. Right ureteral stone  N20.1            Scribe Disclosure:  I, Martir Benitez, am serving as a scribe at 9:26 PM on 4/30/2025 to document services personally performed by Bambi Stephenson MD based on my observations and the provider's statements to me.        Bambi Stephenson MD  05/01/25 0018       Bambi Stephenson MD  05/01/25 0048

## 2025-05-01 NOTE — DISCHARGE INSTRUCTIONS
If you have severe pain at home, you can take additional pain medication to control it.  If this happens, also take 600mg ibuprofen as it specifically helps with ureteral spasms.    Start a medication such as Miralax to prevent constipation while on pain medication.

## 2025-05-01 NOTE — PROGRESS NOTES
"  [facility]: Transitions of Care Outreach  Chief Complaint   Patient presents with    Clinic Care Coordination - Post Hospital       Most Recent Admission Date: 4/30/2025   Most Recent Admission Diagnosis:      Most Recent Discharge Date: 5/1/2025   Most Recent Discharge Diagnosis: Generalized abdominal pain - R10.84  Ureteral stent present - Z96.0  Right ureteral stone - N20.1     Transitions of Care Assessment    Discharge Assessment  How are you doing now that you are home?: \" I am doing ok \"  How are your symptoms? (Red Flag symptoms escalate to triage hotline per guidelines): Improved  Do you know how to contact your clinic care team if you have future questions or changes to your health status? : Yes  Does the patient have their discharge instructions? : Yes  Does the patient have questions regarding their discharge instructions? : No  Were you started on any new medications or were there changes to any of your previous medications? : No  Does the patient have all of their medications?: Yes  Do you have questions regarding any of your medications? : No  Do you have all of your needed medical supplies or equipment (DME)?  (i.e. oxygen tank, CPAP, cane, etc.): Yes    Post-op ([facility] Only)  If the patient had a surgery or procedure, do they have any questions for a nurse?: No           Follow up Plan     Discharge Follow-Up  Discharge follow up appointment scheduled in alignment with recommended follow up timeframe or Transitions of Risk Category? (Low = within 30 days; Moderate= within 14 days; High= within 7 days): No    No future appointments.    Outpatient Plan as outlined on AVS reviewed with patient.    For any urgent concerns, please contact our 24 hour nurse triage line: [phone])       BOSSMAN Delcid                "

## 2025-05-01 NOTE — ED TRIAGE NOTES
Pt arrives via private car in pain he had out patient surgery and had a stint placed went home was eating dinner and developed severe pain in his abd.  Pt unable to tell writer what is going on and keeps requesting water.      Triage Assessment (Adult)       Row Name 04/30/25 2110          Triage Assessment    Airway WDL WDL        Respiratory WDL    Respiratory WDL WDL        Skin Circulation/Temperature WDL    Skin Circulation/Temperature WDL WDL        Cardiac WDL    Cardiac WDL WDL        Peripheral/Neurovascular WDL    Peripheral Neurovascular WDL WDL        Cognitive/Neuro/Behavioral WDL    Cognitive/Neuro/Behavioral WDL WDL        Alyssia Coma Scale    Best Eye Response 4-->(E4) spontaneous     Best Motor Response 6-->(M6) obeys commands     Best Verbal Response 5-->(V5) oriented     [place]m Coma Scale Score 15

## 2025-05-01 NOTE — OR NURSING
"RN called [phone] to complete post - op call. Talked with patient's wife about how patient was doing after the procedure. Patient's wife said that last night patient was having severe pain and blood pressures were in the 200s. Patient's wife said that her and her daughter were RNs and very concerned something was going on. They called the pre/post op number and said that the RN on the line said \"He will need to go to the emergency room to get the medications that he wants\". Patient's wife stated that \"staff treated her  like an addict\" and that wasn't acceptable. She said when she called 911 the paramedics would have taken 30 min to get there and was concerned if he was stroking that he would have . So patient's wife drove him to the emergency room herself. Patient's wife stated in the emergency room the patient got treated for pain and physician said most likely urterers were spasming. RN used therapeutic listening and instructed family or patient to call Dr. Montemayor's clinic number today to get a better plan for pain control. Wife stated patient is better now and doing better now just resting.   "

## 2025-05-02 LAB — BACTERIA UR CULT: NO GROWTH

## 2025-05-12 RX ORDER — BUSPIRONE HYDROCHLORIDE 15 MG/1
15 TABLET ORAL
COMMUNITY
Start: 2025-04-22

## 2025-05-16 ENCOUNTER — APPOINTMENT (OUTPATIENT)
Dept: GENERAL RADIOLOGY | Facility: CLINIC | Age: 60
End: 2025-05-16
Attending: UROLOGY
Payer: MEDICARE

## 2025-05-16 ENCOUNTER — HOSPITAL ENCOUNTER (OUTPATIENT)
Facility: CLINIC | Age: 60
Discharge: HOME | End: 2025-05-16
Attending: UROLOGY | Admitting: UROLOGY
Payer: MEDICARE

## 2025-05-16 VITALS
HEIGHT: 69 IN | DIASTOLIC BLOOD PRESSURE: 79 MMHG | WEIGHT: 210.9 LBS | OXYGEN SATURATION: 93 % | HEART RATE: 89 BPM | BODY MASS INDEX: 31.24 KG/M2 | TEMPERATURE: 97.3 F | RESPIRATION RATE: 16 BRPM | SYSTOLIC BLOOD PRESSURE: 119 MMHG

## 2025-05-16 PROCEDURE — 999N000179 XR SURGERY CARM FLUORO LESS THAN 5 MIN W STILLS

## 2025-05-16 PROCEDURE — 250N000011 HC RX IP 250 OP 636: Performed by: ANESTHESIOLOGY

## 2025-05-16 PROCEDURE — 258N000001 HC RX 258: Performed by: UROLOGY

## 2025-05-16 PROCEDURE — 250N000009 HC RX 250: Performed by: UROLOGY

## 2025-05-16 PROCEDURE — 74420 UROGRAPHY RTRGR +-KUB: CPT | Mod: 26 | Performed by: UROLOGY

## 2025-05-16 PROCEDURE — 272N000001 HC OR GENERAL SUPPLY STERILE: Performed by: UROLOGY

## 2025-05-16 PROCEDURE — 710N000009 HC RECOVERY PHASE 1, LEVEL 1, PER MIN: Performed by: UROLOGY

## 2025-05-16 PROCEDURE — C1758 CATHETER, URETERAL: HCPCS | Performed by: UROLOGY

## 2025-05-16 PROCEDURE — 258N000003 HC RX IP 258 OP 636: Performed by: ANESTHESIOLOGY

## 2025-05-16 PROCEDURE — 370N000017 HC ANESTHESIA TECHNICAL FEE, PER MIN: Performed by: UROLOGY

## 2025-05-16 PROCEDURE — 710N000012 HC RECOVERY PHASE 2, PER MINUTE: Performed by: UROLOGY

## 2025-05-16 PROCEDURE — 52352 CYSTOURETERO W/STONE REMOVE: CPT | Mod: RT | Performed by: UROLOGY

## 2025-05-16 PROCEDURE — 360N000082 HC SURGERY LEVEL 2 W/ FLUORO, PER MIN: Performed by: UROLOGY

## 2025-05-16 PROCEDURE — 82365 CALCULUS SPECTROSCOPY: CPT | Performed by: UROLOGY

## 2025-05-16 PROCEDURE — 250N000025 HC SEVOFLURANE, PER MIN: Performed by: UROLOGY

## 2025-05-16 PROCEDURE — 250N000013 HC RX MED GY IP 250 OP 250 PS 637: Performed by: ANESTHESIOLOGY

## 2025-05-16 PROCEDURE — 999N000141 HC STATISTIC PRE-PROCEDURE NURSING ASSESSMENT: Performed by: UROLOGY

## 2025-05-16 PROCEDURE — 255N000002 HC RX 255 OP 636: Performed by: UROLOGY

## 2025-05-16 RX ORDER — OXYCODONE HYDROCHLORIDE 5 MG/1
5 TABLET ORAL
Status: COMPLETED | OUTPATIENT
Start: 2025-05-16 | End: 2025-05-16

## 2025-05-16 RX ORDER — ACETAMINOPHEN 650 MG/1
650 SUPPOSITORY RECTAL ONCE
Status: COMPLETED | OUTPATIENT
Start: 2025-05-16 | End: 2025-05-16

## 2025-05-16 RX ORDER — NALOXONE HYDROCHLORIDE 0.4 MG/ML
0.1 INJECTION, SOLUTION INTRAMUSCULAR; INTRAVENOUS; SUBCUTANEOUS
Status: DISCONTINUED | OUTPATIENT
Start: 2025-05-16 | End: 2025-05-16 | Stop reason: HOSPADM

## 2025-05-16 RX ORDER — HYDROMORPHONE HCL IN WATER/PF 6 MG/30 ML
0.4 PATIENT CONTROLLED ANALGESIA SYRINGE INTRAVENOUS EVERY 5 MIN PRN
Status: DISCONTINUED | OUTPATIENT
Start: 2025-05-16 | End: 2025-05-16 | Stop reason: HOSPADM

## 2025-05-16 RX ORDER — SODIUM CHLORIDE, SODIUM LACTATE, POTASSIUM CHLORIDE, CALCIUM CHLORIDE 600; 310; 30; 20 MG/100ML; MG/100ML; MG/100ML; MG/100ML
INJECTION, SOLUTION INTRAVENOUS CONTINUOUS
Status: DISCONTINUED | OUTPATIENT
Start: 2025-05-16 | End: 2025-05-16 | Stop reason: HOSPADM

## 2025-05-16 RX ORDER — ONDANSETRON 2 MG/ML
4 INJECTION INTRAMUSCULAR; INTRAVENOUS EVERY 30 MIN PRN
Status: DISCONTINUED | OUTPATIENT
Start: 2025-05-16 | End: 2025-05-16 | Stop reason: HOSPADM

## 2025-05-16 RX ORDER — FENTANYL CITRATE 50 UG/ML
25 INJECTION, SOLUTION INTRAMUSCULAR; INTRAVENOUS EVERY 5 MIN PRN
Status: DISCONTINUED | OUTPATIENT
Start: 2025-05-16 | End: 2025-05-16 | Stop reason: HOSPADM

## 2025-05-16 RX ORDER — ONDANSETRON 4 MG/1
4 TABLET, ORALLY DISINTEGRATING ORAL EVERY 30 MIN PRN
Status: DISCONTINUED | OUTPATIENT
Start: 2025-05-16 | End: 2025-05-16 | Stop reason: HOSPADM

## 2025-05-16 RX ORDER — LABETALOL HYDROCHLORIDE 5 MG/ML
10 INJECTION, SOLUTION INTRAVENOUS
Status: DISCONTINUED | OUTPATIENT
Start: 2025-05-16 | End: 2025-05-16 | Stop reason: HOSPADM

## 2025-05-16 RX ORDER — DEXAMETHASONE SODIUM PHOSPHATE 4 MG/ML
4 INJECTION, SOLUTION INTRA-ARTICULAR; INTRALESIONAL; INTRAMUSCULAR; INTRAVENOUS; SOFT TISSUE
Status: DISCONTINUED | OUTPATIENT
Start: 2025-05-16 | End: 2025-05-16 | Stop reason: HOSPADM

## 2025-05-16 RX ORDER — CEFAZOLIN SODIUM/WATER 2 G/20 ML
2 SYRINGE (ML) INTRAVENOUS SEE ADMIN INSTRUCTIONS
Status: DISCONTINUED | OUTPATIENT
Start: 2025-05-16 | End: 2025-05-16 | Stop reason: HOSPADM

## 2025-05-16 RX ORDER — OXYCODONE HYDROCHLORIDE 5 MG/1
10 TABLET ORAL
Status: DISCONTINUED | OUTPATIENT
Start: 2025-05-16 | End: 2025-05-16 | Stop reason: HOSPADM

## 2025-05-16 RX ORDER — MEPERIDINE HYDROCHLORIDE 25 MG/ML
12.5 INJECTION INTRAMUSCULAR; INTRAVENOUS; SUBCUTANEOUS EVERY 5 MIN PRN
Status: DISCONTINUED | OUTPATIENT
Start: 2025-05-16 | End: 2025-05-16 | Stop reason: HOSPADM

## 2025-05-16 RX ORDER — ALBUTEROL SULFATE 0.83 MG/ML
2.5 SOLUTION RESPIRATORY (INHALATION) EVERY 4 HOURS PRN
Status: DISCONTINUED | OUTPATIENT
Start: 2025-05-16 | End: 2025-05-16 | Stop reason: HOSPADM

## 2025-05-16 RX ORDER — FENTANYL CITRATE 50 UG/ML
50 INJECTION, SOLUTION INTRAMUSCULAR; INTRAVENOUS EVERY 5 MIN PRN
Status: DISCONTINUED | OUTPATIENT
Start: 2025-05-16 | End: 2025-05-16 | Stop reason: HOSPADM

## 2025-05-16 RX ORDER — FENTANYL CITRATE 50 UG/ML
25 INJECTION, SOLUTION INTRAMUSCULAR; INTRAVENOUS
Status: DISCONTINUED | OUTPATIENT
Start: 2025-05-16 | End: 2025-05-16 | Stop reason: HOSPADM

## 2025-05-16 RX ORDER — ACETAMINOPHEN 325 MG/1
975 TABLET ORAL ONCE
Status: COMPLETED | OUTPATIENT
Start: 2025-05-16 | End: 2025-05-16

## 2025-05-16 RX ORDER — HYDROMORPHONE HCL IN WATER/PF 6 MG/30 ML
0.2 PATIENT CONTROLLED ANALGESIA SYRINGE INTRAVENOUS EVERY 5 MIN PRN
Status: DISCONTINUED | OUTPATIENT
Start: 2025-05-16 | End: 2025-05-16 | Stop reason: HOSPADM

## 2025-05-16 RX ORDER — CEFAZOLIN SODIUM/WATER 2 G/20 ML
2 SYRINGE (ML) INTRAVENOUS
Status: COMPLETED | OUTPATIENT
Start: 2025-05-16 | End: 2025-05-16

## 2025-05-16 RX ORDER — LIDOCAINE 40 MG/G
CREAM TOPICAL
Status: DISCONTINUED | OUTPATIENT
Start: 2025-05-16 | End: 2025-05-16 | Stop reason: HOSPADM

## 2025-05-16 RX ORDER — HYDRALAZINE HYDROCHLORIDE 20 MG/ML
2.5-5 INJECTION INTRAMUSCULAR; INTRAVENOUS EVERY 10 MIN PRN
Status: DISCONTINUED | OUTPATIENT
Start: 2025-05-16 | End: 2025-05-16 | Stop reason: HOSPADM

## 2025-05-16 RX ADMIN — SODIUM CHLORIDE, SODIUM LACTATE, POTASSIUM CHLORIDE, AND CALCIUM CHLORIDE: .6; .31; .03; .02 INJECTION, SOLUTION INTRAVENOUS at 12:42

## 2025-05-16 RX ADMIN — MIDAZOLAM 2 MG: 1 INJECTION INTRAMUSCULAR; INTRAVENOUS at 13:25

## 2025-05-16 RX ADMIN — OXYCODONE HYDROCHLORIDE 5 MG: 5 TABLET ORAL at 14:59

## 2025-05-16 ASSESSMENT — ACTIVITIES OF DAILY LIVING (ADL)
ADLS_ACUITY_SCORE: 53
ADLS_ACUITY_SCORE: 52

## 2025-05-16 NOTE — OP NOTE
OPERATIVE REPORT    PREOPERATIVE DIAGNOSIS: Right kidney stone    POSTOPERATIVE DIAGNOSIS: Same    PROCEDURES PERFORMED: Cystoscopy, right retrograde pyelogram, interpretation of fluoroscopic images, right ureteral stent removal, right ureteroscopy with stone basketing    SURGEON: Frederic Montemayor M.D.    ANESTHESIA: General    COMPLICATIONS: None.     SIGNIFICANT FINDINGS: Stone basketed out easily from the kidney      BRIEF OPERATIVE INDICATIONS: Dinesh Farfan is a(n) 59 year old male who presented with a proximal right ureteral stone and had a stent placed previously.  He now presents for stone extraction.  After a discussion of all risks, benefits, and alternatives, the patient elected to proceed with definitive stone management. The patient understands the potential need for more than one procedure to eliminate all stone burden.      DESCRIPTION OF PROCEDURE:  After informed consent was obtained, the patient was transported to the operating room & placed supine on the table. After adequate anesthesia was induced, the patient was placed in lithotomy and prepped and draped in the usual sterile fashion. A timeout was taken to confirm correct patient, procedure and laterality. Pre-operative IV antibiotics were administered.     I introduced the 22 Italian rigid cystoscope through the urethra into the bladder and performed cystoscopy.  The bladder was normal throughout.  I grasped the right-sided stent and pulled to the level of the urethral meatus.  I cannulated the stent with a Glidewire under fluoroscopic guidance and remove the stent.  I then passed a dual-lumen catheter over the Glidewire and I performed retrograde pyelogram.  There were no filling defects or dilatations present.  I passed a second Glidewire into the kidney through the dual-lumen and removed the dual-lumen.  I clamped 1 wire to the drape and then I passed the flexible ureteroscope over the other working wire.  I performed a complete  renoscopy.  There was a single stone seen in the lower pole of the right kidney.  The stone was basketed out without difficulty.  I reinserted the cystoscope and drained the bladder.  I removed the scope.  I removed the wire and the procedure was concluded.    The patient tolerated the procedure well without complications.  He went to the postanesthetic care unit in good condition.  He will go home from there.

## 2025-05-16 NOTE — DISCHARGE INSTRUCTIONS
Maximum acetaminophen (Tylenol) dose from all sources should not exceed 4 grams (4000 mg) per day.       DR. MIKIE LUCIO   CLINIC PHONE NUMBER:  [phone]  [facility]

## 2025-05-19 LAB
APPEARANCE STONE: NORMAL
COMPN STONE: NORMAL
SPECIMEN WT: 14 MG

## (undated) DEVICE — SOLUTION IV IRRIGATION 0.9% NACL 3L R8206

## (undated) DEVICE — SHEATH URETERAL ACCESS NAVIGATOR HD 11/13FRX36CM M0062502220

## (undated) DEVICE — LINEN TOWEL PACK X5 5464

## (undated) DEVICE — GLOVE BIOGEL PI ULTRATOUCH SZ 7.5 41175

## (undated) DEVICE — LINEN HALF SHEET 5512

## (undated) DEVICE — PACK CYSTO CUSTOM RIDGES

## (undated) DEVICE — BASKET STONE RETRIEVAL NTNL ZERO TIP 1.9FRX90CM M0063901050

## (undated) DEVICE — BAG CLEAR TRASH 1.3M 39X33" P4040C

## (undated) DEVICE — GUIDEWIRE URO STR STIFF .035"X150CM NITINOL 150NSS35

## (undated) DEVICE — CONTRAST ISOVUE 300 61% IOPAMIDOL 10X30ML VIAL 131525

## (undated) DEVICE — SOLUTION WATER 1000ML BOTTLE R5000-01

## (undated) DEVICE — LINEN FULL SHEET 5511

## (undated) DEVICE — SOLUTION WATER 1000ML R5000-01

## (undated) DEVICE — CATH URETERAL FLEX TIP TIGERTAIL 06FRX70CM 139006

## (undated) DEVICE — KIT ENDO FIRST STEP DISINFECTANT 200ML W/POUCH EP-4

## (undated) DEVICE — CATH URETERAL DUAL LUMEN 10FRX54CM M0064051000

## (undated) DEVICE — COVER FOOTSWITCH W/CINCH 20X24" 923267

## (undated) DEVICE — SOLUTION IV WATER 3L HYPOTONIC R8006

## (undated) DEVICE — TUBING IRR LG BORE TUBE DRIP CHMBR 2 BG 94IN 313003

## (undated) DEVICE — PREP DYNA-HEX 4% CHG SCRUB 4OZ BOTTLE MDS098710

## (undated) DEVICE — PAD CHUX UNDERPAD 30X36" P3036C

## (undated) RX ORDER — FENTANYL CITRATE 50 UG/ML
INJECTION, SOLUTION INTRAMUSCULAR; INTRAVENOUS
Status: DISPENSED
Start: 2025-04-30

## (undated) RX ORDER — GLYCOPYRROLATE 0.2 MG/ML
INJECTION, SOLUTION INTRAMUSCULAR; INTRAVENOUS
Status: DISPENSED
Start: 2025-05-16

## (undated) RX ORDER — ACETAMINOPHEN 325 MG/1
TABLET ORAL
Status: DISPENSED
Start: 2025-05-16

## (undated) RX ORDER — FENTANYL CITRATE-0.9 % NACL/PF 10 MCG/ML
PLASTIC BAG, INJECTION (ML) INTRAVENOUS
Status: DISPENSED
Start: 2025-05-16

## (undated) RX ORDER — LABETALOL HYDROCHLORIDE 5 MG/ML
INJECTION, SOLUTION INTRAVENOUS
Status: DISPENSED
Start: 2025-04-30

## (undated) RX ORDER — LIDOCAINE HYDROCHLORIDE 10 MG/ML
INJECTION, SOLUTION EPIDURAL; INFILTRATION; INTRACAUDAL; PERINEURAL
Status: DISPENSED
Start: 2025-05-16

## (undated) RX ORDER — DEXAMETHASONE SODIUM PHOSPHATE 4 MG/ML
INJECTION, SOLUTION INTRA-ARTICULAR; INTRALESIONAL; INTRAMUSCULAR; INTRAVENOUS; SOFT TISSUE
Status: DISPENSED
Start: 2024-01-31

## (undated) RX ORDER — OXYCODONE HYDROCHLORIDE 5 MG/1
TABLET ORAL
Status: DISPENSED
Start: 2025-05-16

## (undated) RX ORDER — FENTANYL CITRATE-0.9 % NACL/PF 10 MCG/ML
PLASTIC BAG, INJECTION (ML) INTRAVENOUS
Status: DISPENSED
Start: 2025-04-30

## (undated) RX ORDER — FENTANYL CITRATE 50 UG/ML
INJECTION, SOLUTION INTRAMUSCULAR; INTRAVENOUS
Status: DISPENSED
Start: 2025-05-16

## (undated) RX ORDER — DEXAMETHASONE SODIUM PHOSPHATE 4 MG/ML
INJECTION, SOLUTION INTRA-ARTICULAR; INTRALESIONAL; INTRAMUSCULAR; INTRAVENOUS; SOFT TISSUE
Status: DISPENSED
Start: 2025-04-30

## (undated) RX ORDER — OXYCODONE HYDROCHLORIDE 5 MG/1
TABLET ORAL
Status: DISPENSED
Start: 2025-04-30

## (undated) RX ORDER — PROPOFOL 10 MG/ML
INJECTION, EMULSION INTRAVENOUS
Status: DISPENSED
Start: 2025-04-30

## (undated) RX ORDER — ONDANSETRON 2 MG/ML
INJECTION INTRAMUSCULAR; INTRAVENOUS
Status: DISPENSED
Start: 2025-05-16

## (undated) RX ORDER — ONDANSETRON 2 MG/ML
INJECTION INTRAMUSCULAR; INTRAVENOUS
Status: DISPENSED
Start: 2025-04-30

## (undated) RX ORDER — PROPOFOL 10 MG/ML
INJECTION, EMULSION INTRAVENOUS
Status: DISPENSED
Start: 2025-05-16

## (undated) RX ORDER — DEXAMETHASONE SODIUM PHOSPHATE 4 MG/ML
INJECTION, SOLUTION INTRA-ARTICULAR; INTRALESIONAL; INTRAMUSCULAR; INTRAVENOUS; SOFT TISSUE
Status: DISPENSED
Start: 2025-05-16

## (undated) RX ORDER — KETOROLAC TROMETHAMINE 30 MG/ML
INJECTION, SOLUTION INTRAMUSCULAR; INTRAVENOUS
Status: DISPENSED
Start: 2025-04-30

## (undated) RX ORDER — EPHEDRINE SULFATE 50 MG/ML
INJECTION, SOLUTION INTRAMUSCULAR; INTRAVENOUS; SUBCUTANEOUS
Status: DISPENSED
Start: 2025-05-16

## (undated) RX ORDER — LIDOCAINE HYDROCHLORIDE 10 MG/ML
INJECTION, SOLUTION EPIDURAL; INFILTRATION; INTRACAUDAL; PERINEURAL
Status: DISPENSED
Start: 2025-04-30

## (undated) RX ORDER — CEFAZOLIN SODIUM/WATER 2 G/20 ML
SYRINGE (ML) INTRAVENOUS
Status: DISPENSED
Start: 2025-04-30

## (undated) RX ORDER — CEFAZOLIN SODIUM/WATER 2 G/20 ML
SYRINGE (ML) INTRAVENOUS
Status: DISPENSED
Start: 2025-05-16